# Patient Record
Sex: FEMALE | Race: WHITE | NOT HISPANIC OR LATINO | Employment: OTHER | URBAN - METROPOLITAN AREA
[De-identification: names, ages, dates, MRNs, and addresses within clinical notes are randomized per-mention and may not be internally consistent; named-entity substitution may affect disease eponyms.]

---

## 2022-06-13 LAB — HCV AB SER-ACNC: <0.1

## 2023-01-12 ENCOUNTER — OFFICE VISIT (OUTPATIENT)
Dept: OBGYN CLINIC | Facility: CLINIC | Age: 33
End: 2023-01-12
Payer: COMMERCIAL

## 2023-01-12 VITALS
BODY MASS INDEX: 33.86 KG/M2 | SYSTOLIC BLOOD PRESSURE: 108 MMHG | DIASTOLIC BLOOD PRESSURE: 72 MMHG | HEIGHT: 62 IN | WEIGHT: 184 LBS

## 2023-01-12 DIAGNOSIS — B00.9 HERPES SIMPLEX VIRUS (HSV) INFECTION: ICD-10-CM

## 2023-01-12 DIAGNOSIS — E10.40 TYPE 1 DIABETES MELLITUS WITH DIABETIC NEUROPATHY (HCC): Primary | ICD-10-CM

## 2023-01-12 DIAGNOSIS — Z3A.08 8 WEEKS GESTATION OF PREGNANCY: ICD-10-CM

## 2023-01-12 DIAGNOSIS — E03.9 ACQUIRED HYPOTHYROIDISM: ICD-10-CM

## 2023-01-12 NOTE — PROGRESS NOTES
"Assessment/Plan:    MFM referral given  RTO for PN-1  Type 1 diabetes mellitus with diabetic neuropathy [facility])  -     Ambulatory Referral to Maternal Fetal Medicine; Future    8 weeks gestation of pregnancy  -     Ambulatory Referral to Maternal Fetal Medicine; Future    Herpes simplex virus (HSV) infection    Acquired hypothyroidism        Subjective:      Patient ID: Mackenzie Rhoades is a 27 y o  female  Pat Villalba presents to establish care with [facility] for her pregnancy  She is just being released from [facility] as of 8 weeks  This is a haney IUI pregnancy, with an EDC based on LMP consistent with US per patient  Her last visit with them was yesterday  Discussed practice and delivering campus being [facility]  She would prefer to deliver here due to NICU capabilities in setting of her diabetes, even though [place] may be closer to home  All questions answered  The following portions of the patient's history were reviewed and updated as appropriate: allergies, current medications, past family history, past medical history, past social history, past surgical history and problem list     Review of Systems      Objective:      /72 (BP Location: Right arm, Patient Position: Sitting, Cuff Size: Standard)   Ht 5' 2 21\" (1 58 m)   Wt 83 5 kg (184 lb)   LMP 11/16/2022 (Approximate)   BMI 33 43 kg/m²          Physical Exam  Vitals and nursing note reviewed  Constitutional:       Appearance: Normal appearance  Neurological:      Mental Status: She is alert and oriented to person, place, and time           "

## 2023-01-12 NOTE — ASSESSMENT & PLAN NOTE
Excellent control - on GCM and pump  MFM referral placed       Lab Results   Component Value Date    HGBA1C 5 1 12/21/2022

## 2023-01-12 NOTE — ASSESSMENT & PLAN NOTE
Dose adjusted in December based on last TSH, follows with NISREEN valdez and has appointment in early February

## 2023-01-30 PROBLEM — O24.011: Status: ACTIVE | Noted: 2023-01-30

## 2023-02-02 LAB
CREAT ?TM UR-SCNC: 41.5 UMOL/L
EXT PROTEIN URINE: 4.8
HBA1C MFR BLD HPLC: 5.2 %
PROT/CREAT UR: 116 MG/G{CREAT}

## 2023-03-15 LAB — HBA1C MFR BLD HPLC: 4.8 %

## 2023-05-31 PROBLEM — Z3A.28 28 WEEKS GESTATION OF PREGNANCY: Status: ACTIVE | Noted: 2023-01-12

## 2023-05-31 PROBLEM — O99.213 MATERNAL OBESITY, ANTEPARTUM, THIRD TRIMESTER: Status: ACTIVE | Noted: 2023-04-10

## 2023-05-31 PROBLEM — E10.3493: Status: ACTIVE | Noted: 2018-04-09

## 2023-06-02 ENCOUNTER — TELEPHONE (OUTPATIENT)
Dept: OBGYN CLINIC | Facility: CLINIC | Age: 33
End: 2023-06-02

## 2023-06-02 NOTE — TELEPHONE ENCOUNTER
----- Message from Abigail Kamara sent at 6/1/2023 10:20 PM EDT -----  Regarding: ENT referral   Contact: [phone]  Joe Meade,     You let me know you could get me a referral for an ENT at our appointment yesterday  I am having terrible ear pain today so I was hoping I can contact one soon and try to figure out the cause of the pain  Let me know how to go about getting an appointment and who I should contact?     Thank you,   Abigail Kamara

## 2023-06-12 ENCOUNTER — ROUTINE PRENATAL (OUTPATIENT)
Dept: OBGYN CLINIC | Facility: CLINIC | Age: 33
End: 2023-06-12
Payer: COMMERCIAL

## 2023-06-12 VITALS
WEIGHT: 199.4 LBS | SYSTOLIC BLOOD PRESSURE: 100 MMHG | DIASTOLIC BLOOD PRESSURE: 60 MMHG | OXYGEN SATURATION: 98 % | BODY MASS INDEX: 36.47 KG/M2 | HEART RATE: 108 BPM

## 2023-06-12 DIAGNOSIS — O24.013 PRE-EXISTING TYPE 1 DIABETES MELLITUS WITH RETINOPATHY DURING PREGNANCY IN THIRD TRIMESTER (HCC): ICD-10-CM

## 2023-06-12 DIAGNOSIS — Z3A.29 29 WEEKS GESTATION OF PREGNANCY: ICD-10-CM

## 2023-06-12 DIAGNOSIS — Z34.03 ENCOUNTER FOR SUPERVISION OF NORMAL FIRST PREGNANCY IN THIRD TRIMESTER: ICD-10-CM

## 2023-06-12 DIAGNOSIS — E03.9 HYPOTHYROIDISM DURING PREGNANCY IN THIRD TRIMESTER: ICD-10-CM

## 2023-06-12 DIAGNOSIS — O99.283 HYPOTHYROIDISM DURING PREGNANCY IN THIRD TRIMESTER: ICD-10-CM

## 2023-06-12 DIAGNOSIS — E10.3493: Primary | ICD-10-CM

## 2023-06-12 DIAGNOSIS — E10.319 PRE-EXISTING TYPE 1 DIABETES MELLITUS WITH RETINOPATHY DURING PREGNANCY IN THIRD TRIMESTER (HCC): ICD-10-CM

## 2023-06-12 LAB
SL AMB  POCT GLUCOSE, UA: NORMAL
SL AMB POCT URINE PROTEIN: NORMAL

## 2023-06-12 PROCEDURE — 90715 TDAP VACCINE 7 YRS/> IM: CPT | Performed by: OBSTETRICS & GYNECOLOGY

## 2023-06-12 PROCEDURE — PNV: Performed by: OBSTETRICS & GYNECOLOGY

## 2023-06-12 PROCEDURE — 81002 URINALYSIS NONAUTO W/O SCOPE: CPT | Performed by: OBSTETRICS & GYNECOLOGY

## 2023-06-12 PROCEDURE — 90471 IMMUNIZATION ADMIN: CPT | Performed by: OBSTETRICS & GYNECOLOGY

## 2023-06-12 NOTE — ASSESSMENT & PLAN NOTE
Governor Garrick is doing well  Baby is active without concerns about fetal activity  No bleeding, LOF, contractions  Follows closely with DP and MFM  Labs up to date

## 2023-06-12 NOTE — PROGRESS NOTES
Prenatal visit  GA 29w5d  Denies any vaginal bleeding, LOF or cramping   NL FM  28 wk labs complete  Has yellow folder   Declines breastfeeding   Tdap Vaccine given today   Delivery consent signed

## 2023-06-12 NOTE — PROGRESS NOTES
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, bilateral ([facility] ) - Primary    Relevant Orders    HEMOGLOBIN A1C W/ EAG ESTIMATION    Pre-existing type 1 diabetes mellitus with retinopathy during pregnancy in third trimester ([facility] [address] )     Remains well controlled  Repeat a1c ordered as per Dr Nola Salas most recent [address]  Serial growth scans and APFS scheduled to start at 32wks  Most recent growth 53%, AC 70%  Lab Results   Component Value Date    HGBA1C 4 9 04/27/2023            Hypothyroidism during pregnancy in third trimester       Other    29 weeks gestation of pregnancy     Meron Vasquez is doing well  Baby is active without concerns about fetal activity  No bleeding, LOF, contractions  Follows closely with DP and MFM  Labs up to date           Other Visit Diagnoses     Encounter for supervision of normal first pregnancy in third trimester        Relevant Orders    POCT urine dip    Tdap Vaccine greater than or equal to 8yo

## 2023-06-12 NOTE — ASSESSMENT & PLAN NOTE
Remains well controlled  Repeat a1c ordered as per Dr Hogue Kinde most recent [address]  Serial growth scans and APFS scheduled to start at 32wks    Most recent growth 53%, AC 70%  Lab Results   Component Value Date    HGBA1C 4 9 04/27/2023

## 2023-06-13 ENCOUNTER — CLINICAL SUPPORT (OUTPATIENT)
Dept: POSTPARTUM | Facility: CLINIC | Age: 33
End: 2023-06-13

## 2023-06-13 DIAGNOSIS — Z32.2 ENCOUNTER FOR CHILDBIRTH INSTRUCTION: Primary | ICD-10-CM

## 2023-06-16 ENCOUNTER — TELEPHONE (OUTPATIENT)
Dept: OBGYN CLINIC | Facility: CLINIC | Age: 33
End: 2023-06-16

## 2023-06-16 NOTE — TELEPHONE ENCOUNTER
Patient is scheduled for a root canal on Tuesday - dentist wants to make sure it okay to proceed - she is about 31 weeks

## 2023-06-20 ENCOUNTER — TELEPHONE (OUTPATIENT)
Facility: HOSPITAL | Age: 33
End: 2023-06-20

## 2023-06-20 ENCOUNTER — CLINICAL SUPPORT (OUTPATIENT)
Dept: POSTPARTUM | Facility: CLINIC | Age: 33
End: 2023-06-20

## 2023-06-20 DIAGNOSIS — Z32.2 ENCOUNTER FOR CHILDBIRTH INSTRUCTION: Primary | ICD-10-CM

## 2023-06-20 NOTE — TELEPHONE ENCOUNTER
Left voicemail informing patient of the change for her upcoming appointment on 7/10/23 and 7/12/23  Requested she give our office a call back at [phone] with any questions or if she would need to reschedule

## 2023-06-21 ENCOUNTER — ROUTINE PRENATAL (OUTPATIENT)
Dept: OBGYN CLINIC | Facility: CLINIC | Age: 33
End: 2023-06-21
Payer: COMMERCIAL

## 2023-06-21 ENCOUNTER — TELEPHONE (OUTPATIENT)
Dept: OBGYN CLINIC | Facility: CLINIC | Age: 33
End: 2023-06-21

## 2023-06-21 VITALS — BODY MASS INDEX: 36.03 KG/M2 | WEIGHT: 197 LBS | SYSTOLIC BLOOD PRESSURE: 112 MMHG | DIASTOLIC BLOOD PRESSURE: 78 MMHG

## 2023-06-21 DIAGNOSIS — Z34.90 NORMAL INTRAUTERINE PREGNANCY, ANTEPARTUM: Primary | ICD-10-CM

## 2023-06-21 DIAGNOSIS — F41.9 ANXIETY DURING PREGNANCY IN THIRD TRIMESTER, ANTEPARTUM: ICD-10-CM

## 2023-06-21 DIAGNOSIS — O99.343 ANXIETY DURING PREGNANCY IN THIRD TRIMESTER, ANTEPARTUM: ICD-10-CM

## 2023-06-21 DIAGNOSIS — E10.319 PRE-EXISTING TYPE 1 DIABETES MELLITUS WITH RETINOPATHY DURING PREGNANCY IN THIRD TRIMESTER (HCC): ICD-10-CM

## 2023-06-21 DIAGNOSIS — O24.013 PRE-EXISTING TYPE 1 DIABETES MELLITUS WITH RETINOPATHY DURING PREGNANCY IN THIRD TRIMESTER (HCC): ICD-10-CM

## 2023-06-21 DIAGNOSIS — O16.3 ELEVATED BLOOD PRESSURE AFFECTING PREGNANCY IN THIRD TRIMESTER, ANTEPARTUM: ICD-10-CM

## 2023-06-21 DIAGNOSIS — Z3A.31 31 WEEKS GESTATION OF PREGNANCY: ICD-10-CM

## 2023-06-21 LAB
SL AMB  POCT GLUCOSE, UA: NORMAL
SL AMB POCT URINE PROTEIN: NORMAL

## 2023-06-21 PROCEDURE — PNV: Performed by: PHYSICIAN ASSISTANT

## 2023-06-21 PROCEDURE — 81002 URINALYSIS NONAUTO W/O SCOPE: CPT | Performed by: PHYSICIAN ASSISTANT

## 2023-06-21 NOTE — TELEPHONE ENCOUNTER
Patient has been dealing with multiple dental visits for root canal  Not completed yet and has considerable pain  Endodontist was not comfortable prescribing anything, deferred to OB  Patient is very anxious about having the root canal and is nervous that fetal movement has changed  Although she is meeting [facility]'s, movement is not has frequent and feels different  Requesting visit for doppler for reassurance  Will route to on call provider to advise

## 2023-06-21 NOTE — ASSESSMENT & PLAN NOTE
Referral placed to [facility]  Reviewed option of restarting a SSRI in pregnancy if anxiety worsens or disrupts QOL  Discussed importance of establishing a plan for the postpartum

## 2023-06-21 NOTE — ASSESSMENT & PLAN NOTE
130/90 upon initial presentation   Repeat at end of visit 112/78  Check outpatient baseline labs  Precautions reviewed  Advised to call with s/s; elevated readings  Follow up routine OB visit next week

## 2023-06-21 NOTE — PROGRESS NOTES
Pt here to get heart tones on baby after she had a root canal done yesterday  Feels like baby has decreased in movement

## 2023-06-21 NOTE — ASSESSMENT & PLAN NOTE
Reassurance provided to patient today  Referral to [facility] placed for counseling services  Baseline preeclampsia labs ordered given one elevated reading in office today  Repeat BP normotensive but pt with elevated risk factors for preeclampsia  She will start at home BP monitoring  Reviewed s/s of pre-eclampsia  Reviewed FKCs  Continue at home [facility]   Aware to call if not meeting threshold  Keep routine OB visit as scheduled next week  Case reviewed with Dr Dev Stein

## 2023-06-21 NOTE — TELEPHONE ENCOUNTER
Per on call provider, patient may come in to office for dopplers  Regarding pain medication, feels endodontist/dentist needs to prescribe

## 2023-06-21 NOTE — ASSESSMENT & PLAN NOTE
With some elevations since root canal   Reporting pump adjustments and readings to DP  Last A1C 4 9%  Lab Results   Component Value Date    HGBA1C 4 9 04/27/2023

## 2023-06-21 NOTE — PROGRESS NOTES
Pt presents to the office today for fetal dopplers and reassurance  She underwent a root canal this week and is having more pain  Since the root canal feels as though baby's movements have changed  She is still meeting fetal kick counts but feels it takes longer to meet these kick counts  Was offered appt today for heart tones and reassurance  Admits to worsening anxiety as pregnancy progresses  She was on Lexapro prior to pregnancy and is not interested in restarting this at this time as she feels symptoms are manageable but does wish to speak with a counselor in preparation for postpartum  Pregnancy complicated by Type 1 DM (insulin pump managed by DP), hypothyroidism, BMI 36    Denies VB, LOF, contractions  Her BP is mildly elevated today at 130/90 at the start of the visit today  She is anxious prior to [facility] and reports oral/tooth pain is significant today d/t recent root canal  Urine is neg/neg on dip  Recheck at end of visit 112/78  She reports toothache with some referred pain up jaw toward her head  Denies overt HA, blurred vision, swelling  Listened to [facility] with doppler for about 1 minute at which time 2-3 obvious fetal movement were felt by myself and patient  FHTs between 135s-145s  31 weeks gestation of pregnancy  Reassurance provided to patient today  Referral to [facility] placed for counseling services  Baseline preeclampsia labs ordered given one elevated reading in office today  Repeat BP normotensive but pt with elevated risk factors for preeclampsia  She will start at home BP monitoring  Reviewed s/s of pre-eclampsia  Reviewed FKCs  Continue at home [facility]   Aware to call if not meeting threshold  Keep routine OB visit as scheduled next week  Case reviewed with Dr Isis Peres    Pre-existing type 1 diabetes mellitus with retinopathy during pregnancy in third trimester [facility])  With some elevations since root canal   Reporting pump adjustments and readings to DP  Last A1C 4 9%  Lab Results Component Value Date    HGBA1C 4 9 04/27/2023       Elevated blood pressure affecting pregnancy in third trimester, antepartum  130/90 upon initial presentation   Repeat at end of visit 112/78  Check outpatient baseline labs  Precautions reviewed  Advised to call with s/s; elevated readings  Follow up routine OB visit next week    Anxiety during pregnancy in third trimester, antepartum  Referral placed to [facility]  Reviewed option of restarting a SSRI in pregnancy if anxiety worsens or disrupts QOL  Discussed importance of establishing a plan for the postpartum

## 2023-06-22 ENCOUNTER — TELEMEDICINE (OUTPATIENT)
Facility: HOSPITAL | Age: 33
End: 2023-06-22
Payer: COMMERCIAL

## 2023-06-22 ENCOUNTER — TELEPHONE (OUTPATIENT)
Dept: OBGYN CLINIC | Facility: CLINIC | Age: 33
End: 2023-06-22

## 2023-06-22 DIAGNOSIS — Z3A.31 31 WEEKS GESTATION OF PREGNANCY: ICD-10-CM

## 2023-06-22 DIAGNOSIS — O99.213 MATERNAL OBESITY, ANTEPARTUM, THIRD TRIMESTER: ICD-10-CM

## 2023-06-22 DIAGNOSIS — O24.013 PRE-EXISTING TYPE 1 DIABETES MELLITUS WITH RETINOPATHY DURING PREGNANCY IN THIRD TRIMESTER (HCC): Primary | ICD-10-CM

## 2023-06-22 DIAGNOSIS — E10.319 PRE-EXISTING TYPE 1 DIABETES MELLITUS WITH RETINOPATHY DURING PREGNANCY IN THIRD TRIMESTER (HCC): Primary | ICD-10-CM

## 2023-06-22 PROCEDURE — 99214 OFFICE O/P EST MOD 30 MIN: CPT | Performed by: NURSE PRACTITIONER

## 2023-06-22 NOTE — TELEPHONE ENCOUNTER
Patient had a root canal done - she is 28 wks gest - she is asking if there are any pain meds she can take - dentist needs the approval to prescribe - she stated she is in pain and cannot sleep

## 2023-06-22 NOTE — ASSESSMENT & PLAN NOTE
-Pre-pregnancy weight 184 lbs  -First visit weight 177 lbs  -Current weight 197 lbs  -TWG 13 lbs  -Recommended weight gain 11 to 20 lbs

## 2023-06-22 NOTE — ASSESSMENT & PLAN NOTE
-Last A1c 4 9% at goal   -CMP and UPCR within normal    -Repeat CMP and UPCR  -Humalog via T slim insulin pump     -Due to current dental pain; use decreased temp basal by 10 to 15% until normal eating pattern    -If pain medications are prescribed; use sleep mode or CIQ for safety reasons    -Refer to attached file for current insulin settings    -Dexcom G6 CGM in place    -Try in take combination of carbohydrates and protein via drink or soft foods until dental pain subsides  -Try to eat every 2 to 3 5 hours during the day  Minimum total daily carbohydrates in second/third trimester 175 grams paired with half in protein  -SMBG via finger stick for fasting glucose readings, 2 hours post start of each meal and with hypoglycemia   -Insulin pump review every Monday   -Always have glucose available for hypoglycemia, use 15 by 15 rule  -Follow up with OB and MFM as recommended   -Fetal growth ultrasounds every 4 to 6 weeks as recommended   -Fetal echo completed    -At 32 weeks gestation, NST twice a week and BART weekly  -Stay in close contact with diabetes team    -At 36 weeks gestation, stop baby aspirin    -Follow up with endocrinology postpartum diabetes management     Lab Results   Component Value Date    HGBA1C 4 9 04/27/2023     Lab Results   Component Value Date    HGBA1C 4 9 04/27/2023

## 2023-06-22 NOTE — TELEPHONE ENCOUNTER
Pt had root canal done on Tues and finished this morning. Pt in a lot of pain. Tylenol no help. Is there anything else she can take for pain?  Thank you

## 2023-06-22 NOTE — PROGRESS NOTES
Virtual Regular Visit    Verification of patient location:[place]    Patient is located at Home in the following state in which I hold an active license [place]      Assessment/Plan:    Problem List Items Addressed This Visit        Endocrine    Pre-existing type 1 diabetes mellitus with retinopathy during pregnancy in third trimester ([facility] [address] ) - Primary     -Last A1c 4 9% at goal   -CMP and UPCR within normal    -Repeat CMP and UPCR  -Humalog via T slim insulin pump     -Due to current dental pain; use decreased temp basal by 10 to 15% until normal eating pattern    -If pain medications are prescribed; use sleep mode or CIQ for safety reasons    -Refer to attached file for current insulin settings    -Dexcom G6 CGM in place    -Try in take combination of carbohydrates and protein via drink or soft foods until dental pain subsides  -Try to eat every 2 to 3 5 hours during the day  Minimum total daily carbohydrates in second/third trimester 175 grams paired with half in protein  -SMBG via finger stick for fasting glucose readings, 2 hours post start of each meal and with hypoglycemia   -Insulin pump review every Monday   -Always have glucose available for hypoglycemia, use 15 by 15 rule  -Follow up with OB and MFM as recommended   -Fetal growth ultrasounds every 4 to 6 weeks as recommended   -Fetal echo completed    -At 32 weeks gestation, NST twice a week and BART weekly  -Stay in close contact with diabetes team    -At 36 weeks gestation, stop baby aspirin    -Follow up with endocrinology postpartum diabetes management  Lab Results   Component Value Date    HGBA1C 4 9 04/27/2023     Lab Results   Component Value Date    HGBA1C 4 9 04/27/2023               Other    31 weeks gestation of pregnancy    BMI 36 0-36 9,adult    Maternal obesity, antepartum, third trimester     -Pre-pregnancy weight 184 lbs  -First visit weight 177 lbs  -Current weight 197 lbs  -TWG 13 lbs  -Recommended weight gain 11 to 20 lbs  Reason for visit is   Chief Complaint   Patient presents with   • Virtual Regular Visit   • Diabetes Type 1        Encounter provider Cherelle Christiansen    Provider located at [facility] [address]      Recent Visits  Date Type Provider Dept   23 Telephone Gopi Prado An    Showing recent visits within past 7 days and meeting all other requirements  Today's Visits  Date Type Provider Dept   23 [id number] [facility] LAKESHA Cook An    Showing today's visits and meeting all other requirements  Future Appointments  No visits were found meeting these conditions  Showing future appointments within next 150 days and meeting all other requirements       The patient was identified by name and date of birth  Margret Thacker was informed that this is a telemedicine visit and that the visit is being conducted through the [address] [employer] platform  She agrees to proceed     My office door was closed  No one else was in the room  She acknowledged consent and understanding of privacy and security of the video platform  The patient has agreed to participate and understands they can discontinue the visit at any time  Patient is aware this is a billable service  Subjective  Margret hTacker is a 27 y o  female G1P) 31 1/7 weeks gestation T1DM on humalog via T slim insulin pump and Dexcom G6 CGM; does self adjust insulin pump to stay within pregnancy goals and will send messages with adjustments made  Did use CIQ during root canal in order to stay safe during procedure  For readings less than 60; will use 9 or 18 grams of juice for treatment  Overall keeps very tight control of glucose readings   Downloads insulin pump every week from home and maintains contact with diabetes team    Had root canal completed; has been dealing with pain since Tuesday and unable to eat; drinking milk; having mash potatoes and pudding  Encouraged to decrease basal rates by 10 to 15% for now  Request pain medication and to use insulin pump sleep mode for safety reasons  Past Medical History:   Diagnosis Date   • Anxiety    • Diabetes mellitus ([address] )     type 1; since age 1; on dexcom (since summer 2022) and Tandem T-slim (<2mos) with excellent trend down in A1 (5 5!); had rough go in college years (did not take care of her diabetes); follows with LAKESHA Dhaliwal  • Elective procedure for unacceptable cosmetic appearance 2012    smart lipo   • Hypothyroidism 2012       Past Surgical History:   Procedure Laterality Date   • HAND SURGERY  2022       Current Outpatient Medications   Medication Sig Dispense Refill   • aspirin 81 mg chewable tablet 162 mg     • Continuous Blood Gluc  (Dexcom G6 ) TONA Use to monitor blood sugar 1 each 0   • Continuous Blood Gluc Sensor (Dexcom G6 Sensor) MISC Change every 10 days 3 each 12   • Continuous Blood Gluc Transmit (Dexcom G6 Transmitter) MISC Change every 90 days 1 each 3   • docusate sodium (COLACE) 100 mg capsule Take 100 mg by mouth 2 (two) times a day     • Doxylamine Succinate, Sleep, (UNISOM PO) Take by mouth     • folic acid (FOLVITE) 1 mg tablet Take 1 tablet (1,000 mcg total) by mouth daily 180 tablet 3   • Glucagon, rDNA, (Glucagon Emergency) 1 MG KIT Inject in the event of unconscious hypoglycemia  1 kit 0   • HumaLOG 100 UNIT/ML injection USE VIA INSULIN PUMP UP  UNITS DAILY 30 mL 6   • levothyroxine 150 mcg tablet TAKE ONE TABLET BY MOUTH EVERY DAY 30 tablet 11   • Nitrofurantoin Monohyd Macro (MACROBID PO) Take by mouth Prn with intercourse as needed (Patient not taking: Reported on 6/12/2023)     • Prenatal MV-Min-Fe Fum-FA-DHA (PRENATAL 1 PO) Take 1 tablet by mouth in the morning       No current facility-administered medications for this visit          Allergies   Allergen Reactions   • Sulfamethoxazole      Other reaction(s): severe nausea/vomiting (03/13/2017)   • Trimethoprim      Other reaction(s): severe nausea/vomiting (03/13/2017)       Review of Systems   HENT:        Dental pain   Neurological: Positive for headaches  Video Exam  Refer to attached files for insulin pump download and Dexcom report  There were no vitals filed for this visit  Physical Exam  Constitutional:       Appearance: She is ill-appearing (looks uncomfortable due to dental pain)  HENT:      Head: Normocephalic  Nose: Nose normal    Eyes:      Conjunctiva/sclera: Conjunctivae normal    Pulmonary:      Effort: Pulmonary effort is normal    Neurological:      Mental Status: She is oriented to person, place, and time  Psychiatric:         Behavior: Behavior normal          Thought Content: Thought content normal          Judgment: Judgment normal           Visit Time  Total Visit Duration: 15 minutes

## 2023-06-22 NOTE — TELEPHONE ENCOUNTER
Pt will use an ice bag and take tylenol. Trying unisom for help with sleeping. Call tomorrow if still in pain.

## 2023-06-22 NOTE — PATIENT INSTRUCTIONS
-Last A1c 4 9% at goal   -CMP and UPCR within normal    -Repeat CMP and UPCR  -Humalog via T slim insulin pump     -Due to current dental pain; use decreased temp basal by 10 to 15% until normal eating pattern    -If pain medications are prescribed; use sleep mode or CIQ for safety reasons    -Refer to attached file for current insulin settings    -Dexcom G6 CGM in place    -Try in take combination of carbohydrates and protein via drink or soft foods until dental pain subsides  -Try to eat every 2 to 3 5 hours during the day  Minimum total daily carbohydrates in second/third trimester 175 grams paired with half in protein  -SMBG via finger stick for fasting glucose readings, 2 hours post start of each meal and with hypoglycemia   -Insulin pump review every Monday   -Always have glucose available for hypoglycemia, use 15 by 15 rule  -Follow up with OB and MFM as recommended   -Fetal growth ultrasounds every 4 to 6 weeks as recommended   -Fetal echo completed    -At 32 weeks gestation, NST twice a week and BART weekly  -Stay in close contact with diabetes team    -At 36 weeks gestation, stop baby aspirin    -Follow up with endocrinology postpartum diabetes management     Lab Results   Component Value Date    HGBA1C 4 9 04/27/2023

## 2023-06-23 ENCOUNTER — TELEPHONE (OUTPATIENT)
Dept: OBGYN CLINIC | Facility: CLINIC | Age: 33
End: 2023-06-23

## 2023-06-23 DIAGNOSIS — D72.829 LEUKOCYTOSIS, UNSPECIFIED TYPE: Primary | ICD-10-CM

## 2023-06-23 LAB
ALBUMIN SERPL-MCNC: 3.3 G/DL (ref 3.8–4.8)
ALBUMIN/GLOB SERPL: 1 {RATIO} (ref 1.2–2.2)
ALP SERPL-CCNC: 91 IU/L (ref 44–121)
ALT SERPL-CCNC: 8 IU/L (ref 0–32)
AST SERPL-CCNC: 17 IU/L (ref 0–40)
BASOPHILS # BLD AUTO: 0.1 X10E3/UL (ref 0–0.2)
BASOPHILS NFR BLD AUTO: 0 %
BILIRUB SERPL-MCNC: 0.4 MG/DL (ref 0–1.2)
BUN SERPL-MCNC: 10 MG/DL (ref 6–20)
BUN/CREAT SERPL: 14 (ref 9–23)
CALCIUM SERPL-MCNC: 8.7 MG/DL (ref 8.7–10.2)
CHLORIDE SERPL-SCNC: 103 MMOL/L (ref 96–106)
CO2 SERPL-SCNC: 23 MMOL/L (ref 20–29)
CREAT SERPL-MCNC: 0.69 MG/DL (ref 0.57–1)
CREAT UR-MCNC: 64.5 MG/DL
EGFR: 117 ML/MIN/1.73
EOSINOPHIL # BLD AUTO: 0.5 X10E3/UL (ref 0–0.4)
EOSINOPHIL NFR BLD AUTO: 3 %
ERYTHROCYTE [DISTWIDTH] IN BLOOD BY AUTOMATED COUNT: 12.2 % (ref 11.7–15.4)
EST. AVERAGE GLUCOSE BLD GHB EST-MCNC: 97 MG/DL
GLOBULIN SER-MCNC: 3.4 G/DL (ref 1.5–4.5)
GLUCOSE SERPL-MCNC: 73 MG/DL (ref 70–99)
HBA1C MFR BLD: 5 % (ref 4.8–5.6)
HCT VFR BLD AUTO: 35.8 % (ref 34–46.6)
HGB BLD-MCNC: 12.3 G/DL (ref 11.1–15.9)
IMM GRANULOCYTES # BLD: 0.1 X10E3/UL (ref 0–0.1)
IMM GRANULOCYTES NFR BLD: 1 %
LYMPHOCYTES # BLD AUTO: 2.7 X10E3/UL (ref 0.7–3.1)
LYMPHOCYTES NFR BLD AUTO: 17 %
MCH RBC QN AUTO: 31.5 PG (ref 26.6–33)
MCHC RBC AUTO-ENTMCNC: 34.4 G/DL (ref 31.5–35.7)
MCV RBC AUTO: 92 FL (ref 79–97)
MONOCYTES # BLD AUTO: 1.2 X10E3/UL (ref 0.1–0.9)
MONOCYTES NFR BLD AUTO: 8 %
NEUTROPHILS # BLD AUTO: 11.4 X10E3/UL (ref 1.4–7)
NEUTROPHILS NFR BLD AUTO: 71 %
PLATELET # BLD AUTO: 230 X10E3/UL (ref 150–450)
POTASSIUM SERPL-SCNC: 4.1 MMOL/L (ref 3.5–5.2)
PROT SERPL-MCNC: 6.7 G/DL (ref 6–8.5)
PROT UR-MCNC: 8.5 MG/DL
PROT/CREAT UR: 132 MG/G CREAT (ref 0–200)
RBC # BLD AUTO: 3.91 X10E6/UL (ref 3.77–5.28)
SODIUM SERPL-SCNC: 138 MMOL/L (ref 134–144)
WBC # BLD AUTO: 15.9 X10E3/UL (ref 3.4–10.8)

## 2023-06-28 ENCOUNTER — ROUTINE PRENATAL (OUTPATIENT)
Dept: OBGYN CLINIC | Facility: CLINIC | Age: 33
End: 2023-06-28
Payer: COMMERCIAL

## 2023-06-28 VITALS — SYSTOLIC BLOOD PRESSURE: 108 MMHG | WEIGHT: 199 LBS | BODY MASS INDEX: 36.4 KG/M2 | DIASTOLIC BLOOD PRESSURE: 62 MMHG

## 2023-06-28 DIAGNOSIS — Z34.03 ENCOUNTER FOR SUPERVISION OF NORMAL FIRST PREGNANCY IN THIRD TRIMESTER: Primary | ICD-10-CM

## 2023-06-28 LAB
SL AMB  POCT GLUCOSE, UA: ABNORMAL
SL AMB POCT URINE PROTEIN: ABNORMAL

## 2023-06-28 PROCEDURE — 81002 URINALYSIS NONAUTO W/O SCOPE: CPT | Performed by: OBSTETRICS & GYNECOLOGY

## 2023-06-28 PROCEDURE — PNV: Performed by: OBSTETRICS & GYNECOLOGY

## 2023-06-28 NOTE — PROGRESS NOTES
Prenatal 32w0d  Blood type A Positive  Has MFM appointments scheduled thru Aug   Delivery consent previously signed  Declines breast pump  Denies Leaking of Fluid, vaginal bleeding, contractions  +Fetal Movement

## 2023-07-03 ENCOUNTER — ULTRASOUND (OUTPATIENT)
Dept: PERINATAL CARE | Facility: OTHER | Age: 33
End: 2023-07-03
Payer: COMMERCIAL

## 2023-07-03 VITALS
BODY MASS INDEX: 36.4 KG/M2 | HEART RATE: 104 BPM | RESPIRATION RATE: 18 BRPM | WEIGHT: 199 LBS | DIASTOLIC BLOOD PRESSURE: 80 MMHG | OXYGEN SATURATION: 99 % | SYSTOLIC BLOOD PRESSURE: 116 MMHG

## 2023-07-03 DIAGNOSIS — O99.213 MATERNAL OBESITY, ANTEPARTUM, THIRD TRIMESTER: ICD-10-CM

## 2023-07-03 DIAGNOSIS — Z3A.32 32 WEEKS GESTATION OF PREGNANCY: Primary | ICD-10-CM

## 2023-07-03 DIAGNOSIS — E10.3493: ICD-10-CM

## 2023-07-03 PROCEDURE — 76816 OB US FOLLOW-UP PER FETUS: CPT | Performed by: OBSTETRICS & GYNECOLOGY

## 2023-07-03 PROCEDURE — 59025 FETAL NON-STRESS TEST: CPT | Performed by: OBSTETRICS & GYNECOLOGY

## 2023-07-03 PROCEDURE — 99214 OFFICE O/P EST MOD 30 MIN: CPT | Performed by: OBSTETRICS & GYNECOLOGY

## 2023-07-03 NOTE — PROGRESS NOTES
Non-Stress Testing:    Non-Stress test, equipment, procedure, and expected outcomes reviewed. Reviewed fetal kick counts and when to call OB. Shannan Conrad Verified patient understanding of fetal kick counts with teach back method. Patient reports feeling daily fetal movements. Patient has no questions or concerns.

## 2023-07-03 NOTE — LETTER
July 3, 2023     Esteban Dockery, [address] [address]    Patient: Halina Colon   YOB: 1990   Date of Visit: 7/3/2023       Dear Dr. Jorge L Diehl: Thank you for referring Halina Colon to me for evaluation. Below are my notes for this consultation. If you have questions, please do not hesitate to call me. I look forward to following your patient along with you. Sincerely,        Reji Ravi MD        CC: No Recipients    Reji Ravi MD  7/3/2023  2:51 PM  Sign when Signing Visit  A fetal ultrasound and NST were completed. See Ob procedures in Epic for an interpretation and recommendations. Do not hesitate to contact us in [facility] if you have questions. Harjinder Crockett MD, [address] [facility]  Maternal Fetal Medicine

## 2023-07-03 NOTE — PROGRESS NOTES
A fetal ultrasound and NST were completed. See Ob procedures in Epic for an interpretation and recommendations. Do not hesitate to contact us in [facility] if you have questions. Chente Palomo MD, [address] [facility]  Maternal Fetal Medicine

## 2023-07-03 NOTE — LETTER
NST sleeve cover sheet    Patient name: Philip Damon  : 1990  MRN: [id number]    SHOAIB: Estimated Date of Delivery: 23    Obstetrician:  Claudio Christensen    Reason(s) for testing:  A2GDM      Testing frequency:    _x_ 2x/wk  ___ 1x/wk  ___ Dopplers  ___ BPP?       Last growth scan: __________________________________________

## 2023-07-07 ENCOUNTER — ROUTINE PRENATAL (OUTPATIENT)
Dept: PERINATAL CARE | Facility: OTHER | Age: 33
End: 2023-07-07
Payer: COMMERCIAL

## 2023-07-07 VITALS
DIASTOLIC BLOOD PRESSURE: 68 MMHG | WEIGHT: 199.4 LBS | HEART RATE: 104 BPM | SYSTOLIC BLOOD PRESSURE: 122 MMHG | BODY MASS INDEX: 36.7 KG/M2 | HEIGHT: 62 IN

## 2023-07-07 DIAGNOSIS — O24.013 PRE-EXISTING TYPE 1 DIABETES MELLITUS WITH RETINOPATHY DURING PREGNANCY IN THIRD TRIMESTER (HCC): Primary | ICD-10-CM

## 2023-07-07 DIAGNOSIS — Z3A.33 33 WEEKS GESTATION OF PREGNANCY: ICD-10-CM

## 2023-07-07 DIAGNOSIS — E10.319 PRE-EXISTING TYPE 1 DIABETES MELLITUS WITH RETINOPATHY DURING PREGNANCY IN THIRD TRIMESTER (HCC): Primary | ICD-10-CM

## 2023-07-07 PROCEDURE — 59025 FETAL NON-STRESS TEST: CPT | Performed by: OBSTETRICS & GYNECOLOGY

## 2023-07-07 NOTE — PROGRESS NOTES
[address] [facility]: Ms. Luz Elena Wolfe was seen today for NST (found under the pregnancy episode) which I reviewed the RN assessment and agree. Please don't hesitate to contact our office with any concerns or questions.   Ashly Hilton MD

## 2023-07-07 NOTE — PATIENT INSTRUCTIONS
Thank you for choosing us for your  care today. If you have any questions about your ultrasound or care, please do not hesitate to contact us or your primary obstetrician. Some general instructions for your pregnancy are:    Exercise: Aim for 22 minutes per day (150 minutes per week) of regular exercise. Walking is great! Nutrition: Choose healthy sources of calcium, iron, and protein. Learn about Preeclampsia: preeclampsia is a common, serious high blood pressure complication in pregnancy. A blood pressure of 079ZOWW (systolic or top number) or 96IESR (diastolic or bottom number) is not normal and needs evaluation by your doctor. Aspirin is sometimes prescribed in early pregnancy to prevent preeclampsia in women with risk factors - ask your obstetrician if you should be on this medication. For more resources, visit:  MapCoverage.fi  If you smoke, try to reduce how many cigarettes you smoke or try to quit completely. Do not vape. Other warning signs to watch out for in pregnancy or postpartum: chest pain, obstructed breathing or shortness of breath, seizures, thoughts of hurting yourself or your baby, bleeding, a painful or swollen leg, fever, or headache (see [employer] POST-BIRTH Warning Signs campaign). If these happen call 911. Itching is also not normal in pregnancy and if you experience this, especially over your hands and feet, potentially worse at night, notify your doctors.

## 2023-07-07 NOTE — PROGRESS NOTES
Repeat Non-Stress Testing:    Patient verbalizes +FM. Pt denies ALL:               Leaking of fluid   Contractions   Vaginal bleeding   Decreased fetal movement    Patient is performing daily kick counts. Patient has no questions or concerns.

## 2023-07-10 ENCOUNTER — ROUTINE PRENATAL (OUTPATIENT)
Dept: PERINATAL CARE | Facility: OTHER | Age: 33
End: 2023-07-10
Payer: COMMERCIAL

## 2023-07-10 VITALS
WEIGHT: 200.2 LBS | HEIGHT: 62 IN | SYSTOLIC BLOOD PRESSURE: 124 MMHG | BODY MASS INDEX: 36.84 KG/M2 | HEART RATE: 99 BPM | DIASTOLIC BLOOD PRESSURE: 66 MMHG

## 2023-07-10 DIAGNOSIS — Z3A.33 33 WEEKS GESTATION OF PREGNANCY: ICD-10-CM

## 2023-07-10 DIAGNOSIS — E10.319 PRE-EXISTING TYPE 1 DIABETES MELLITUS WITH RETINOPATHY DURING PREGNANCY IN THIRD TRIMESTER (HCC): Primary | ICD-10-CM

## 2023-07-10 DIAGNOSIS — O24.013 PRE-EXISTING TYPE 1 DIABETES MELLITUS WITH RETINOPATHY DURING PREGNANCY IN THIRD TRIMESTER (HCC): Primary | ICD-10-CM

## 2023-07-10 PROCEDURE — 59025 FETAL NON-STRESS TEST: CPT | Performed by: OBSTETRICS & GYNECOLOGY

## 2023-07-11 ENCOUNTER — ROUTINE PRENATAL (OUTPATIENT)
Dept: OBGYN CLINIC | Facility: CLINIC | Age: 33
End: 2023-07-11
Payer: COMMERCIAL

## 2023-07-11 VITALS
BODY MASS INDEX: 36.54 KG/M2 | HEART RATE: 103 BPM | WEIGHT: 199.8 LBS | SYSTOLIC BLOOD PRESSURE: 106 MMHG | DIASTOLIC BLOOD PRESSURE: 76 MMHG

## 2023-07-11 DIAGNOSIS — Z34.03 ENCOUNTER FOR SUPERVISION OF NORMAL FIRST PREGNANCY IN THIRD TRIMESTER: Primary | ICD-10-CM

## 2023-07-11 DIAGNOSIS — E03.9 HYPOTHYROIDISM DURING PREGNANCY IN THIRD TRIMESTER: ICD-10-CM

## 2023-07-11 DIAGNOSIS — O16.3 ELEVATED BLOOD PRESSURE AFFECTING PREGNANCY IN THIRD TRIMESTER, ANTEPARTUM: ICD-10-CM

## 2023-07-11 DIAGNOSIS — E10.319 PRE-EXISTING TYPE 1 DIABETES MELLITUS WITH RETINOPATHY DURING PREGNANCY IN THIRD TRIMESTER (HCC): ICD-10-CM

## 2023-07-11 DIAGNOSIS — B00.9 HERPES SIMPLEX VIRUS (HSV) INFECTION: ICD-10-CM

## 2023-07-11 DIAGNOSIS — O99.283 HYPOTHYROIDISM DURING PREGNANCY IN THIRD TRIMESTER: ICD-10-CM

## 2023-07-11 DIAGNOSIS — O24.013 PRE-EXISTING TYPE 1 DIABETES MELLITUS WITH RETINOPATHY DURING PREGNANCY IN THIRD TRIMESTER (HCC): ICD-10-CM

## 2023-07-11 DIAGNOSIS — O43.193 MARGINAL INSERTION OF UMBILICAL CORD AFFECTING MANAGEMENT OF MOTHER IN THIRD TRIMESTER: ICD-10-CM

## 2023-07-11 DIAGNOSIS — Z3A.33 33 WEEKS GESTATION OF PREGNANCY: ICD-10-CM

## 2023-07-11 DIAGNOSIS — O99.213 MATERNAL OBESITY, ANTEPARTUM, THIRD TRIMESTER: ICD-10-CM

## 2023-07-11 LAB
SL AMB  POCT GLUCOSE, UA: ABNORMAL
SL AMB POCT URINE PROTEIN: ABNORMAL

## 2023-07-11 PROCEDURE — 81002 URINALYSIS NONAUTO W/O SCOPE: CPT | Performed by: PHYSICIAN ASSISTANT

## 2023-07-11 PROCEDURE — PNV: Performed by: PHYSICIAN ASSISTANT

## 2023-07-11 RX ORDER — VALACYCLOVIR HYDROCHLORIDE 500 MG/1
500 TABLET, FILM COATED ORAL 2 TIMES DAILY
Qty: 60 TABLET | Refills: 1 | Status: SHIPPED | OUTPATIENT
Start: 2023-07-11 | End: 2023-09-09

## 2023-07-11 NOTE — ASSESSMENT & PLAN NOTE
Do Velasco  is a 27 y.o.  @33w6d who presents for routine prenatal visit. Growth scan- EFW71% at 32 weeks  TDAP up to date  GBS due next visit  Plans to breastfeed. Pump -   Perineal massage -   PP Contraception -   Has yellow packet. Reports good fetal movement. Denies LOF, vaginal bleeding, regular uterine contractions, cramping, headaches or visual changes. Reviewed PTL/Labor precautions and FKC.

## 2023-07-11 NOTE — ASSESSMENT & PLAN NOTE
One genital outbreak 14 years ago  Rx sent for Valtrex prophylaxis   Advised to start between 35-36 wks

## 2023-07-11 NOTE — PROGRESS NOTES
Please refer to the [facility] ultrasound report in Ob Procedures for additional information regarding today's visit

## 2023-07-11 NOTE — ASSESSMENT & PLAN NOTE
T1DM followed closely by DP and compliant with care  Adequate control as last A1C 5.0%  We discussed recommendation of 39 0/7- 39 6/7 IOL for preexisting adequately controlled DM. She is agreeable to this plan  Will plan to send 39 wk IOL request today  Will have pt continue to follow with MFM for twice weekly NST, weekly BART and serial growth scans. Reviewed if MFM changes delivery recommendations then can readjust induction scheduled  IOL consents were not signed today.  Can sign at future appointments  Lab Results   Component Value Date    HGBA1C 5.0 06/22/2023

## 2023-07-11 NOTE — PROGRESS NOTES
33 weeks gestation of pregnancy  Carlos Alberto Tilley  is a 27 y.o.  @33w6d who presents for routine prenatal visit. Growth scan- EFW71% at 32 weeks  TDAP up to date  GBS due next visit  Plans to breastfeed. Pump -   Perineal massage -   PP Contraception -   Has yellow packet. Reports good fetal movement. Denies LOF, vaginal bleeding, regular uterine contractions, cramping, headaches or visual changes. Reviewed PTL/Labor precautions and FKC. 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 She is agreeable to this plan  Will plan to send 39 wk IOL request today  Will have pt continue to follow with MFM for twice weekly NST, weekly BART and serial growth scans. Reviewed if MFM changes delivery recommendations then can readjust induction scheduled  IOL consents were not signed today.  Can sign at future appointments  Lab Results   Component Value Date    HGBA1C 5.0 2023       Hypothyroidism during pregnancy in third trimester  Last TSH 2.060  Due for repeat studies  Reminded to complete    Maternal obesity, antepartum, third trimester  TWG 15#  Taking ASA  Advised to d/c ASA at 36 wks    Marginal insertion of umbilical cord affecting management of mother in third trimester  Serial growth US scheduled    Herpes simplex virus (HSV) infection  One genital outbreak 14 years ago  Rx sent for Valtrex prophylaxis   Advised to start between 35-36 wks    Elevated blood pressure affecting pregnancy in third trimester, antepartum  Normotensive today

## 2023-07-11 NOTE — PROGRESS NOTES
Patient here for prenatal visit. She denies complaints. Good fetal movement. She does not plan on breastfeeding. Pediatric referral placed. NST & US for fetal growth 7/10/23. NST's scheduled twice weekly. Urine neg for glucose; trace of protein. Birth plan handed in today.

## 2023-07-12 ENCOUNTER — PATIENT MESSAGE (OUTPATIENT)
Dept: OBGYN CLINIC | Facility: CLINIC | Age: 33
End: 2023-07-12

## 2023-07-12 ENCOUNTER — ULTRASOUND (OUTPATIENT)
Dept: PERINATAL CARE | Facility: OTHER | Age: 33
End: 2023-07-12
Payer: COMMERCIAL

## 2023-07-12 ENCOUNTER — TELEPHONE (OUTPATIENT)
Dept: OBGYN CLINIC | Facility: CLINIC | Age: 33
End: 2023-07-12

## 2023-07-12 VITALS
WEIGHT: 199.4 LBS | BODY MASS INDEX: 36.7 KG/M2 | HEART RATE: 101 BPM | HEIGHT: 62 IN | SYSTOLIC BLOOD PRESSURE: 114 MMHG | DIASTOLIC BLOOD PRESSURE: 62 MMHG

## 2023-07-12 DIAGNOSIS — E10.319 PRE-EXISTING TYPE 1 DIABETES MELLITUS WITH RETINOPATHY DURING PREGNANCY IN THIRD TRIMESTER (HCC): Primary | ICD-10-CM

## 2023-07-12 DIAGNOSIS — Z3A.34 34 WEEKS GESTATION OF PREGNANCY: ICD-10-CM

## 2023-07-12 DIAGNOSIS — O24.013 PRE-EXISTING TYPE 1 DIABETES MELLITUS WITH RETINOPATHY DURING PREGNANCY IN THIRD TRIMESTER (HCC): Primary | ICD-10-CM

## 2023-07-12 PROCEDURE — 59025 FETAL NON-STRESS TEST: CPT | Performed by: OBSTETRICS & GYNECOLOGY

## 2023-07-12 PROCEDURE — 76815 OB US LIMITED FETUS(S): CPT | Performed by: OBSTETRICS & GYNECOLOGY

## 2023-07-12 NOTE — LETTER
July 12, 2023     Zelda Flanagan, [address]    Patient: Adrian Maza   YOB: 1990   Date of Visit: 7/12/2023       Dear Dr. Anahi Leigh: Thank you for referring Adrian Maza to me for evaluation. Below are my notes for this consultation. If you have questions, please do not hesitate to call me. I look forward to following your patient along with you.          Sincerely,        Efrain Meza MD        CC: No Recipients    Efrain Meza MD  7/11/2023  5:17 PM  Sign when Signing Visit  Please refer to the [facility] ultrasound report in Ob Procedures for additional information regarding today's visit

## 2023-07-12 NOTE — TELEPHONE ENCOUNTER
----- Message from Clarence Elias PA-C sent at 7/11/2023  5:04 PM EDT -----  Regarding: IOL scheduling  Procedure to be scheduled (IOL or CS): medical IOL  SHOAIB: Estimated Date of Delivery: 8/23/23  Indication for delivery: pre-existing type 1 DM  Requested date (s) of delivery: 39 0/7- 39 6/7    If requested date is unavailable, is there a date by which the pt must be delivered? 44 6/7  Physician preference: prefers a date when Zain or Akanksha are on [facility] call    If IOL, anticipated method: with ripening  If CS, with or without tubal: n/a

## 2023-07-12 NOTE — TELEPHONE ENCOUNTER
Patient notified of IOL date,time,and location. Advised patient she may eat a light breakfast/dinner prior to going to L&D. In the interim please report any vaginal bleeding,leakage of fluid,decreased fetal movement or contractions. Reviewed fetal kick counts. Advised to keep all upcoming prenatal visits. Patient verbalizes understanding. Routed to on call providers as FYI. 8/17 at 8pm - left message for patient.

## 2023-07-16 NOTE — PROGRESS NOTES
Please refer to the [facility] ultrasound report in Ob Procedures for additional information regarding today's visit

## 2023-07-19 ENCOUNTER — ULTRASOUND (OUTPATIENT)
Dept: PERINATAL CARE | Facility: OTHER | Age: 33
End: 2023-07-19
Payer: COMMERCIAL

## 2023-07-19 VITALS
HEART RATE: 96 BPM | SYSTOLIC BLOOD PRESSURE: 134 MMHG | DIASTOLIC BLOOD PRESSURE: 80 MMHG | HEIGHT: 62 IN | BODY MASS INDEX: 37.13 KG/M2 | WEIGHT: 201.8 LBS

## 2023-07-19 DIAGNOSIS — E10.319 PRE-EXISTING TYPE 1 DIABETES MELLITUS WITH RETINOPATHY DURING PREGNANCY IN THIRD TRIMESTER (HCC): Primary | ICD-10-CM

## 2023-07-19 DIAGNOSIS — Z3A.35 35 WEEKS GESTATION OF PREGNANCY: ICD-10-CM

## 2023-07-19 DIAGNOSIS — O24.013 PRE-EXISTING TYPE 1 DIABETES MELLITUS WITH RETINOPATHY DURING PREGNANCY IN THIRD TRIMESTER (HCC): Primary | ICD-10-CM

## 2023-07-19 PROCEDURE — 59025 FETAL NON-STRESS TEST: CPT | Performed by: OBSTETRICS & GYNECOLOGY

## 2023-07-19 PROCEDURE — 76815 OB US LIMITED FETUS(S): CPT | Performed by: OBSTETRICS & GYNECOLOGY

## 2023-07-19 NOTE — LETTER
July 19, 2023     [facility], [address]    Patient: Yvonne Wisdom   YOB: 1990   Date of Visit: 7/19/2023       Dear Dr. Benji Bustillo: Thank you for referring Yvonne Wisdom to me for evaluation. Below are my notes for this consultation. If you have questions, please do not hesitate to call me. I look forward to following your patient along with you.          Sincerely,        Christopher Vallejo MD        CC: No Recipients    Christopher Vallejo MD  7/16/2023  1:15 PM  Sign when Signing Visit  Please refer to the [facility] ultrasound report in Ob Procedures for additional information regarding today's visit

## 2023-07-21 ENCOUNTER — ROUTINE PRENATAL (OUTPATIENT)
Dept: PERINATAL CARE | Facility: OTHER | Age: 33
End: 2023-07-21
Payer: COMMERCIAL

## 2023-07-21 VITALS
WEIGHT: 204.2 LBS | HEIGHT: 62 IN | HEART RATE: 98 BPM | BODY MASS INDEX: 37.58 KG/M2 | SYSTOLIC BLOOD PRESSURE: 114 MMHG | DIASTOLIC BLOOD PRESSURE: 70 MMHG

## 2023-07-21 DIAGNOSIS — O24.013 PRE-EXISTING TYPE 1 DIABETES MELLITUS WITH RETINOPATHY DURING PREGNANCY IN THIRD TRIMESTER (HCC): Primary | ICD-10-CM

## 2023-07-21 DIAGNOSIS — Z3A.35 35 WEEKS GESTATION OF PREGNANCY: ICD-10-CM

## 2023-07-21 DIAGNOSIS — E10.319 PRE-EXISTING TYPE 1 DIABETES MELLITUS WITH RETINOPATHY DURING PREGNANCY IN THIRD TRIMESTER (HCC): Primary | ICD-10-CM

## 2023-07-21 PROCEDURE — 59025 FETAL NON-STRESS TEST: CPT | Performed by: OBSTETRICS & GYNECOLOGY

## 2023-07-21 PROCEDURE — 76815 OB US LIMITED FETUS(S): CPT | Performed by: OBSTETRICS & GYNECOLOGY

## 2023-07-23 NOTE — PATIENT INSTRUCTIONS
Thank you for choosing us for your  care today. If you have any questions about your ultrasound or care, please do not hesitate to contact us or your primary obstetrician. Use your automated blood pressure cuff to check your blood pressure at home. Write down the top number (systolic) and bottom number (diastolic). This will help your providers follow your blood pressure and decide if any additional testing or monitoring is necessary. A top number (systolic) of at or above 075GUPE OR a bottom number (diastolic) of at or above 90 mmHg is considered elevated. Report any elevations to your doctors; if severe (top number >= 160 OR bottom number >= 100, call Labor and Delivery). Some general instructions for your pregnancy are:    Exercise: Aim for 22 minutes per day (150 minutes per week) of regular exercise. Walking is great! Nutrition: Choose healthy sources of calcium, iron, and protein. Learn about Preeclampsia: preeclampsia is a common, serious high blood pressure complication in pregnancy. A blood pressure of 362CIBA (systolic or top number) or 16FQOV (diastolic or bottom number) is not normal and needs evaluation by your doctor. Aspirin is sometimes prescribed in early pregnancy to prevent preeclampsia in women with risk factors - ask your obstetrician if you should be on this medication. For more resources, visit:  MapCoverage.fi  If you smoke, try to reduce how many cigarettes you smoke or try to quit completely. Do not vape. Other warning signs to watch out for in pregnancy or postpartum: chest pain, obstructed breathing or shortness of breath, seizures, thoughts of hurting yourself or your baby, bleeding, a painful or swollen leg, fever, or headache (see [employer] POST-BIRTH Warning Signs campaign). If these happen call 911.   Itching is also not normal in pregnancy and if you experience this, especially over your hands and feet, potentially worse at night, notify your doctors.

## 2023-07-24 ENCOUNTER — ULTRASOUND (OUTPATIENT)
Dept: PERINATAL CARE | Facility: OTHER | Age: 33
End: 2023-07-24
Payer: COMMERCIAL

## 2023-07-24 VITALS
BODY MASS INDEX: 37.58 KG/M2 | HEART RATE: 93 BPM | DIASTOLIC BLOOD PRESSURE: 78 MMHG | SYSTOLIC BLOOD PRESSURE: 124 MMHG | WEIGHT: 204.2 LBS | HEIGHT: 62 IN

## 2023-07-24 DIAGNOSIS — E10.3212 MILD NONPROLIFERATIVE DIABETIC RETINOPATHY OF LEFT EYE WITH MACULAR EDEMA ASSOCIATED WITH TYPE 1 DIABETES MELLITUS (HCC): ICD-10-CM

## 2023-07-24 DIAGNOSIS — E10.319 PRE-EXISTING TYPE 1 DIABETES MELLITUS WITH RETINOPATHY DURING PREGNANCY IN THIRD TRIMESTER (HCC): ICD-10-CM

## 2023-07-24 DIAGNOSIS — O99.213 MATERNAL OBESITY, ANTEPARTUM, THIRD TRIMESTER: ICD-10-CM

## 2023-07-24 DIAGNOSIS — O43.193 MARGINAL INSERTION OF UMBILICAL CORD AFFECTING MANAGEMENT OF MOTHER IN THIRD TRIMESTER: ICD-10-CM

## 2023-07-24 DIAGNOSIS — O24.013 PRE-EXISTING TYPE 1 DIABETES MELLITUS WITH RETINOPATHY DURING PREGNANCY IN THIRD TRIMESTER (HCC): ICD-10-CM

## 2023-07-24 DIAGNOSIS — Z3A.35 35 WEEKS GESTATION OF PREGNANCY: ICD-10-CM

## 2023-07-24 DIAGNOSIS — Z36.89 ENCOUNTER FOR ULTRASOUND TO CHECK FETAL GROWTH: Primary | ICD-10-CM

## 2023-07-24 PROCEDURE — 76816 OB US FOLLOW-UP PER FETUS: CPT | Performed by: OBSTETRICS & GYNECOLOGY

## 2023-07-24 PROCEDURE — 76818 FETAL BIOPHYS PROFILE W/NST: CPT | Performed by: OBSTETRICS & GYNECOLOGY

## 2023-07-24 PROCEDURE — 99214 OFFICE O/P EST MOD 30 MIN: CPT | Performed by: OBSTETRICS & GYNECOLOGY

## 2023-07-24 NOTE — LETTER
2023    Kim Dela Cruz MD  [address]    Patient: Mira Cabrera   YOB: 1990   Date of Visit: 2023   Gestational age 29w6d   Laura Barrett of this communication: Routine though please note delivery timing 37-38 weeks by vasculopathy   Who saw her last from your group: Kailyn Napoles   Who is seeing her next from your group: Nani       Dear Sandeep Woodard,    This patient was seen recently in our  office. The content of my evaluation today is in the ultrasound report under "OB Procedures" tab. Please don't hesitate to contact our office with any concerns or questions.      Sincerely,    Nette Prater MD  Attending Physician, [address] [place] [address]

## 2023-07-24 NOTE — PROGRESS NOTES
[facility]: Ms. Francheska Gotti was seen today for NST (found under the pregnancy episode) which I reviewed the RN assessment and agree, and fetal growth ultrasound (see ultrasound report under OB procedures tab). The time spent on this established patient on the encounter date included 5 minutes previsit service time reviewing records and precharting, 10 minutes face-to-face service time counseling regarding results and coordinating care, and  5 minutes charting, totalling 20 minutes. Please don't hesitate to contact our office with any concerns or questions.   Edgar Rios MD

## 2023-07-25 ENCOUNTER — OFFICE VISIT (OUTPATIENT)
Dept: ENDOCRINOLOGY | Facility: HOSPITAL | Age: 33
End: 2023-07-25
Payer: COMMERCIAL

## 2023-07-25 VITALS
HEIGHT: 62 IN | WEIGHT: 203.8 LBS | BODY MASS INDEX: 37.5 KG/M2 | DIASTOLIC BLOOD PRESSURE: 80 MMHG | HEART RATE: 106 BPM | SYSTOLIC BLOOD PRESSURE: 120 MMHG

## 2023-07-25 DIAGNOSIS — E03.9 HYPOTHYROIDISM AFFECTING PREGNANCY IN SECOND TRIMESTER: ICD-10-CM

## 2023-07-25 DIAGNOSIS — O99.281 THYROID DISEASE DURING PREGNANCY IN FIRST TRIMESTER: ICD-10-CM

## 2023-07-25 DIAGNOSIS — E07.9 THYROID DISEASE DURING PREGNANCY IN THIRD TRIMESTER: Primary | ICD-10-CM

## 2023-07-25 DIAGNOSIS — E07.9 THYROID DISEASE DURING PREGNANCY IN FIRST TRIMESTER: ICD-10-CM

## 2023-07-25 DIAGNOSIS — O99.283 THYROID DISEASE DURING PREGNANCY IN THIRD TRIMESTER: Primary | ICD-10-CM

## 2023-07-25 DIAGNOSIS — E10.40 TYPE 1 DIABETES MELLITUS WITH DIABETIC NEUROPATHY (HCC): ICD-10-CM

## 2023-07-25 DIAGNOSIS — E10.9 TYPE 1 DIABETES MELLITUS WITHOUT COMPLICATION (HCC): ICD-10-CM

## 2023-07-25 DIAGNOSIS — O99.282 HYPOTHYROIDISM AFFECTING PREGNANCY IN SECOND TRIMESTER: ICD-10-CM

## 2023-07-25 DIAGNOSIS — E10.3212 MILD NONPROLIFERATIVE DIABETIC RETINOPATHY OF LEFT EYE WITH MACULAR EDEMA ASSOCIATED WITH TYPE 1 DIABETES MELLITUS (HCC): ICD-10-CM

## 2023-07-25 DIAGNOSIS — E03.9 ACQUIRED HYPOTHYROIDISM: ICD-10-CM

## 2023-07-25 LAB
T4 FREE SERPL-MCNC: 1.27 NG/DL (ref 0.82–1.77)
TSH SERPL DL<=0.005 MIU/L-ACNC: 0.75 UIU/ML (ref 0.45–4.5)

## 2023-07-25 PROCEDURE — 99214 OFFICE O/P EST MOD 30 MIN: CPT | Performed by: NURSE PRACTITIONER

## 2023-07-25 NOTE — PATIENT INSTRUCTIONS
Be mindful of diet. Stay active and stay hydrated. For now, we will continue your current insulin pump settings. Continue to follow-up with maternal-fetal medicine. Please discuss recent mild low blood sugars. Continue the DEX COM. KEEP UP THE GREAT WORK !!!     Continue levothyroxine at current dose. Take your levothyroxine consistently, in the morning, on an empty stomach with only water and wait 30 min before eating and 4 hr before taking multivitamins or supplements. Recheck TSH and free T4 every 4 weeks throughout pregnancy. Obtain lab work prior to next visit.

## 2023-07-25 NOTE — PROGRESS NOTES
Mehran Prim 27 y.o. female MRN: [id number]    Encounter: [id number]      Assessment/Plan     Assessment: This is a 27y.o.-year-old female with type  1 diabetes and hypothyroidism in her third trimester. Plan:  1.  Hypothyroidism. Most recent thyroid function studies are normal with a TSH of 1.6. For now, she will continue levothyroxine 150 mcg - 1 tablet daily for 6 days of the week and half tablet on Sunday.  She will continue to get monthly blood work while pregnant.      2.  Type 1 diabetes. Ashia Underwood is utilizing a t:slim 2 insulin pump with Dexcom G6 continuous glucose monitoring system.  She is following with maternal-fetal medicine while she is pregnant. Ashia Underwood was asked to contact maternal-fetal medicine given her blood sugars are low at times.     CC: Type 1 Diabetes/ Hypothyroidism  Follow up    History of Present Illness     HPI:  27y.o. year old female with history of hypothyroidism and type 1 diabetes with retinopathy for follow-up visit.     She has hypothyroidism and takes levothyroxine 150 mcg 1 tablet - 6 days a week with 0.5 tablet on Sunday. She is 36 weeks gestation. Her most resent TSH from July 24, 2023 is 1.670 with a Free T4 of 1.50.     She has type 1 diabetes diagnosed 31 years ago. Ashia Underwood is currently pregnant on an insulin pump utilizing a t:slim to insulin pump with Dexcom G6 continuous glucose monitoring system integrated with her insulin pump.  She utilizes Humalog insulin in the insulin pump.  She is following with maternal-fetal medicine for her diabetes while pregnant.  She last visited maternal-fetal medicine was on March 27, 2023.  Last diabetic eye exam was in summer 2022  and last diabetic foot exam was April 2022.     She utilizes the Tandem T-Slim X 2 insulin pump with Dexcom G6 continuous glucose monitoring system integrated with her insulin pump.  She is currently not in control IQ as this was requested to be discontinued by maternal-fetal medicine recently. Ashia Underwood has noticed some lower blood sugars overnight at times.     Basal rates:  12 AM to 1 AM 0.35 units/h, 1 AM to 3 AM 0.7 units/h, 3 AM to 6 AM 1 unit/h, 6 AM to 9:15 AM 0.9 units/h, 9:15 AM to 11:45 AM 1 unit/h, 11:45 AM to 3:30 PM 0.7 units/h, 3:30 PM to 5:30 PM 0.85 units/h, 5:30 PM to 7:30 PM 0.88 units/h, 7:30 PM to 10 PM 1.1 units/h, 10 PM to 12 AM 0.65 units/h. Bolus rates:  12 AM to 6 AM 1 unit per 5 g carbohydrate, 6 AM to 11:45 AM 1 unit per 4 g carbohydrate, 11:45 AM to 5:30 PM 1 unit per 5 g carbohydrate, 5:30 PM to 7:30 PM 1 unit per 4.5 g carbohydrate, 7:30 PM to 12 AM 1 unit per 4 g carbohydrate. Insulin sensitivity factor:   12 AM to 1 AM 1 unit per 40 blood sugar points for target blood glucose of 100, 1 AM to 6 AM 1 unit per 45 blood sugar points for target blood glucose of 95, 6 AM to 5:30 PM 1 unit per 40 blood sugar points for target blood glucose of 100,  5:30 PM to 7:30 PM 1 unit per 37 blood sugar points for target blood glucose of 100, and 7:30 PM to 12 AM 1 unit per 45 blood sugar points for target blood glucose of 100. Insulin action:  4.3 hours.     Dexcom G6 continuous glucose monitoring system download was reviewed in the office from March 30 through April 12, 2023.  CGM is active 93% of the time.  Average glucose 99 mg/dL with standard deviation of 28 mg/dL.  86% of blood sugars are in target range, 1% high, 0% very high, 13% low, 1% very low. Review of Systems   Constitutional: Positive for fatigue. Negative for chills and fever. HENT: Negative. Negative for trouble swallowing and voice change. Eyes: Negative for photophobia, pain, discharge, redness, itching and visual disturbance. Respiratory: Negative. Negative for chest tightness and shortness of breath. Cardiovascular: Negative. Negative for chest pain. Gastrointestinal: Negative. Negative for abdominal pain, constipation, diarrhea and vomiting.    Endocrine: Negative for cold intolerance, heat intolerance, polydipsia, polyphagia and polyuria. Genitourinary: Negative. Musculoskeletal: Negative. Skin: Negative. Allergic/Immunologic: Negative. Neurological: Negative. Negative for dizziness, syncope, light-headedness and headaches. Hematological: Negative. Psychiatric/Behavioral: Negative. All other systems reviewed and are negative. Historical Information   Past Medical History:   Diagnosis Date   • Anxiety    • Diabetes mellitus ([address])     type 1; since age 1; on dexcom (since summer 2022) and Tandem T-slim (<2mos) with excellent trend down in A1 (5.5!); had rough go in college years (did not take care of her diabetes); follows with LAKESHA Dhaliwal.    • Elective procedure for unacceptable cosmetic appearance 2012    smart lipo   • Hypothyroidism 2012     Past Surgical History:   Procedure Laterality Date   • HAND SURGERY  2022     Social History   Social History     Substance and Sexual Activity   Alcohol Use Not Currently    Comment: I have not had a drink in about 1 year     Social History     Substance and Sexual Activity   Drug Use No     Social History     Tobacco Use   Smoking Status Never   Smokeless Tobacco Never     Family History:   Family History   Problem Relation Age of Onset   • Asthma Mother    • Hypertension Mother    • No Known Problems Father    • Asthma Sister    • No Known Problems Sister    • No Known Problems Maternal Grandmother    • No Known Problems Maternal Grandfather    • No Known Problems Paternal Grandmother    • No Known Problems Paternal Grandfather        Meds/Allergies   Current Outpatient Medications   Medication Sig Dispense Refill   • aspirin 81 mg chewable tablet 162 mg     • Continuous Blood Gluc  (Dexcom G6 ) TONA Use to monitor blood sugar 1 each 0   • Continuous Blood Gluc Sensor (Dexcom G6 Sensor) MISC Change every 10 days 3 each 12   • Continuous Blood Gluc Transmit (Dexcom G6 Transmitter) MISC Change every 90 days 1 each 3   • docusate sodium (COLACE) 100 mg capsule Take 100 mg by mouth 2 (two) times a day     • Doxylamine Succinate, Sleep, (UNISOM PO) Take by mouth     • folic acid (FOLVITE) 1 mg tablet Take 1 tablet (1,000 mcg total) by mouth daily 180 tablet 3   • Glucagon, rDNA, (Glucagon Emergency) 1 MG KIT Inject in the event of unconscious hypoglycemia. 1 kit 0   • HumaLOG 100 UNIT/ML injection USE VIA INSULIN PUMP UP  UNITS DAILY 30 mL 6   • levothyroxine 150 mcg tablet TAKE ONE TABLET BY MOUTH EVERY DAY 30 tablet 11   • Prenatal MV-Min-Fe Fum-FA-DHA (PRENATAL 1 PO) Take 1 tablet by mouth in the morning     • valACYclovir (VALTREX) 500 mg tablet Take 1 tablet (500 mg total) by mouth 2 (two) times a day 60 tablet 1     No current facility-administered medications for this visit. Allergies   Allergen Reactions   • Sulfamethoxazole      Other reaction(s): severe nausea/vomiting (03/13/2017)   • Trimethoprim      Other reaction(s): severe nausea/vomiting (03/13/2017)       Objective   Vitals: Blood pressure 120/80, pulse (!) 106, height 5' 2" (1.575 m), weight 92.4 kg (203 lb 12.8 oz), last menstrual period 11/16/2022, not currently breastfeeding. Physical Exam  Vitals reviewed. Constitutional:       Appearance: She is well-developed. HENT:      Head: Normocephalic and atraumatic. Eyes:      Conjunctiva/sclera: Conjunctivae normal.      Pupils: Pupils are equal, round, and reactive to light. Cardiovascular:      Rate and Rhythm: Normal rate and regular rhythm. Heart sounds: Normal heart sounds. Pulmonary:      Effort: Pulmonary effort is normal.      Breath sounds: Normal breath sounds. Abdominal:      General: Bowel sounds are normal.      Palpations: Abdomen is soft. Musculoskeletal:         General: Normal range of motion. Cervical back: Normal range of motion and neck supple. Skin:     General: Skin is warm and dry. Neurological:      Mental Status: She is alert and oriented to person, place, and time. Psychiatric:         Behavior: Behavior normal.         Thought Content:  Thought content normal.         Judgment: Judgment normal.       Lab Results:   Lab Results   Component Value Date/Time    Hemoglobin A1C 5.0 06/22/2023 09:26 AM    Hemoglobin A1C 4.9 04/27/2023 11:30 AM    Hemoglobin A1C 4.8 03/15/2023 12:00 AM    Hemoglobin A1C 5.2 02/02/2023 12:00 AM    Hemoglobin A1C 5.1 12/21/2022 10:35 AM    White Blood Cell Count 15.9 (H) 06/22/2023 09:22 AM    White Blood Cell Count 13.2 (H) 05/30/2023 09:52 AM    White Blood Cell Count 11.5 (H) 02/02/2023 10:11 AM    Hemoglobin 12.3 06/22/2023 09:22 AM    Hemoglobin 11.7 05/30/2023 09:52 AM    Hemoglobin 13.0 02/02/2023 10:11 AM    HCT 35.8 06/22/2023 09:22 AM    HCT 33.6 (L) 05/30/2023 09:52 AM    HCT 37.8 02/02/2023 10:11 AM    MCV 92 06/22/2023 09:22 AM    MCV 90 05/30/2023 09:52 AM    MCV 89 02/02/2023 10:11 AM    Platelet Count 697 13/21/6240 09:22 AM    Platelet Count 184 29/51/3788 09:52 AM    Platelet Count 694 92/33/2758 10:11 AM    BUN 10 06/22/2023 09:22 AM    BUN 11 12/21/2022 10:35 AM    BUN 13 10/26/2022 11:46 AM    Potassium 4.1 06/22/2023 09:22 AM    Potassium 3.9 12/21/2022 10:35 AM    Potassium 4.0 10/26/2022 11:46 AM    Chloride 103 06/22/2023 09:22 AM    Chloride 102 12/21/2022 10:35 AM    Chloride 101 10/26/2022 11:46 AM    CO2 23 06/22/2023 09:22 AM    CO2 23 12/21/2022 10:35 AM    CO2 26 10/26/2022 11:46 AM    Creatinine 0.69 06/22/2023 09:22 AM    Creatinine 0.86 12/21/2022 10:35 AM    Creatinine 0.85 10/26/2022 11:46 AM    AST 17 06/22/2023 09:22 AM    AST 17 12/21/2022 10:35 AM    AST 17 10/26/2022 11:46 AM    ALT 8 06/22/2023 09:22 AM    ALT 8 12/21/2022 10:35 AM    ALT 13 10/26/2022 11:46 AM    Protein, Total 6.7 06/22/2023 09:22 AM    Protein, Total 6.8 12/21/2022 10:35 AM    Protein, Total 7.1 10/26/2022 11:46 AM    Albumin 3.3 (L) 06/22/2023 09:22 AM    Albumin 3.8 12/21/2022 10:35 AM    Albumin 4.2 10/26/2022 11:46 AM    Globulin, Total 3.4 06/22/2023 09:22 AM    Globulin, Total 3.0 12/21/2022 10:35 AM    Globulin, Total 2.9 10/26/2022 11:46 AM    HDL 50 12/21/2022 10:35 AM    Triglycerides 62 12/21/2022 10:35 AM     Portions of the record may have been created with voice recognition software. Occasional wrong word or "sound a like" substitutions may have occurred due to the inherent limitations of voice recognition software. Read the chart carefully and recognize, using context, where substitutions have occurred.

## 2023-07-26 ENCOUNTER — ROUTINE PRENATAL (OUTPATIENT)
Dept: OBGYN CLINIC | Facility: CLINIC | Age: 33
End: 2023-07-26
Payer: COMMERCIAL

## 2023-07-26 ENCOUNTER — ROUTINE PRENATAL (OUTPATIENT)
Dept: PERINATAL CARE | Facility: OTHER | Age: 33
End: 2023-07-26
Payer: COMMERCIAL

## 2023-07-26 VITALS
SYSTOLIC BLOOD PRESSURE: 122 MMHG | HEIGHT: 62 IN | HEART RATE: 105 BPM | BODY MASS INDEX: 37.17 KG/M2 | DIASTOLIC BLOOD PRESSURE: 64 MMHG | WEIGHT: 202 LBS

## 2023-07-26 VITALS
HEART RATE: 123 BPM | BODY MASS INDEX: 37.06 KG/M2 | WEIGHT: 202.6 LBS | DIASTOLIC BLOOD PRESSURE: 68 MMHG | SYSTOLIC BLOOD PRESSURE: 112 MMHG

## 2023-07-26 DIAGNOSIS — Z3A.36 36 WEEKS GESTATION OF PREGNANCY: ICD-10-CM

## 2023-07-26 DIAGNOSIS — Z34.03 ENCOUNTER FOR SUPERVISION OF NORMAL FIRST PREGNANCY IN THIRD TRIMESTER: Primary | ICD-10-CM

## 2023-07-26 DIAGNOSIS — O24.013 PRE-EXISTING TYPE 1 DIABETES MELLITUS WITH RETINOPATHY DURING PREGNANCY IN THIRD TRIMESTER (HCC): Primary | ICD-10-CM

## 2023-07-26 DIAGNOSIS — E10.319 PRE-EXISTING TYPE 1 DIABETES MELLITUS WITH RETINOPATHY DURING PREGNANCY IN THIRD TRIMESTER (HCC): Primary | ICD-10-CM

## 2023-07-26 LAB
SL AMB  POCT GLUCOSE, UA: ABNORMAL
SL AMB POCT URINE PROTEIN: ABNORMAL

## 2023-07-26 PROCEDURE — 59025 FETAL NON-STRESS TEST: CPT | Performed by: NURSE PRACTITIONER

## 2023-07-26 PROCEDURE — 81002 URINALYSIS NONAUTO W/O SCOPE: CPT | Performed by: STUDENT IN AN ORGANIZED HEALTH CARE EDUCATION/TRAINING PROGRAM

## 2023-07-26 PROCEDURE — PNV: Performed by: STUDENT IN AN ORGANIZED HEALTH CARE EDUCATION/TRAINING PROGRAM

## 2023-07-26 NOTE — PROGRESS NOTES
[facility]: Ms. Leonard Nyhan was seen today for NST (found under the pregnancy episode) which I reviewed the RN assessment and agree.    Please don't hesitate to contact our office with any concerns or questions.  -LAKESHA Lucas

## 2023-07-26 NOTE — PROGRESS NOTES
Prenatal visit 36w0d  Denies cramping, bleeding, leakage of fluid. Normal fetal movement.    GBS done today   Breast pump not needed  Delivery consent signed  IOL scheduled for 8/4/23 at 8pm

## 2023-07-26 NOTE — PROGRESS NOTES
27 y.o.  at 36w0d, here for routine OB visit. Feeling well overall and without concerns. Good FM. Denies LOF, VB, contractions.       Problem List Items Addressed This Visit        Other    36 weeks gestation of pregnancy     -precautions reviewed  -GBS collected today  -prepregnancy BMI 33 with goal weight gain 11-20#: TWG = 18#        Other Visit Diagnoses     Encounter for supervision of normal first pregnancy in third trimester    -  Primary    Relevant Orders    Strep B DNA probe, amplification    POCT urine dip (Completed)

## 2023-07-26 NOTE — ASSESSMENT & PLAN NOTE
-precautions reviewed  -GBS collected today  -prepregnancy BMI 33 with goal weight gain 11-20#: TWG = 18#

## 2023-07-28 LAB — GP B STREP DNA SPEC QL NAA+PROBE: NEGATIVE

## 2023-07-29 ENCOUNTER — NURSE TRIAGE (OUTPATIENT)
Dept: OTHER | Facility: OTHER | Age: 33
End: 2023-07-29

## 2023-07-29 NOTE — TELEPHONE ENCOUNTER
Regardin weeks pregnant/diarrhea  ----- Message from Hu Erazo sent at 2023 10:49 AM EDT -----  "I am 36 weeks pregnant and I have diarrhea for the last 3 days not sure what to do"

## 2023-07-29 NOTE — TELEPHONE ENCOUNTER
Reason for Disposition  • SEVERE diarrhea (e.g., 7 or more times / day more than normal)    Answer Assessment - Initial Assessment Questions  1. DIARRHEA SEVERITY: "How bad is the diarrhea?" "How many extra stools have you had in the past 24 hours than normal?"     - NO DIARRHEA (SCALE 0)    - MILD (SCALE 1-3): Few loose or mushy BMs; increase of 1-3 stools over normal daily number of stools; mild increase in ostomy output. -  MODERATE (SCALE 4-7): Increase of 4-6 stools daily over normal; moderate increase in ostomy output. * SEVERE (SCALE 8-10; OR 'WORST POSSIBLE'): Increase of 7 or more stools daily over normal; moderate increase in ostomy output; incontinence. 15 times    2. ONSET: "When did the diarrhea begin?"     Wednesday    3. BM CONSISTENCY: "How loose or watery is the diarrhea?"    watery    4. VOMITING: "Are you also vomiting?" If Yes, ask: "How many times in the past 24 hours?"   No    5. ABDOMINAL PAIN: "Are you having any abdominal pain?" If Yes, ask: "What does it feel like?" (e.g., crampy, dull, intermittent, constant)   No    6. ABDOMINAL PAIN SEVERITY: If present, ask: "How bad is the pain?"  (e.g., Scale 1-10; mild, moderate, or severe)    - MILD (1-3): doesn't interfere with normal activities, abdomen soft and not tender to touch     - MODERATE (4-7): interferes with normal activities or awakens from sleep, tender to touch     - SEVERE (8-10): excruciating pain, doubled over, unable to do any normal activities    N/a    7. ORAL INTAKE: If vomiting, "Have you been able to drink liquids?" "How much fluids have you had in the past 24 hours?"  Yes    8. HYDRATION: "Any signs of dehydration?" (e.g., dry mouth [not just dry lips], too weak to stand, dizziness, new weight loss) "When did you last urinate?"   denies    9. EXPOSURE: "Have you traveled to a foreign country recently?" "Have you been exposed to anyone with diarrhea?" "Could you have eaten any food that was spoiled?"  No    10. ANTIBIOTIC USE: "Are you taking antibiotics now or have you taken antibiotics in the past 2 months?"     No    11. OTHER SYMPTOMS: "Do you have any other symptoms?" (e.g., fever, blood in stool)    Nausea and "feels drained."     12. PREGNANCY: "Is there any chance you are pregnant?" "When was your last menstrual period?"  36w3d    Good FM  Taking imodium as needed and eating bland food    Protocols used: DIARRHEA-ADULT-AH      Paged on call provider. Provider stated if any concerns for dehydration or decrease in FM, pt should be seen in triage. Otherwise pt can continue to monitor and follow up with office on Monday if not resolved. Informed patient and she verbalized understanding.

## 2023-08-01 NOTE — PROGRESS NOTES
Prenatal Visit   37w0d    PN1 completed  Nuchal completed   AFP completed  Level 2 completed  28 week labs completed   Consents signed  Breast pump ordered   Tdap UTD  GBS neg  Cervix check today     Denies LOF, VB, or CTX.   Pt has +FM  Patients urine is   Patient has no questions/concerns today

## 2023-08-02 ENCOUNTER — HOSPITAL ENCOUNTER (INPATIENT)
Facility: HOSPITAL | Age: 33
LOS: 5 days | Discharge: HOME | End: 2023-08-07
Attending: STUDENT IN AN ORGANIZED HEALTH CARE EDUCATION/TRAINING PROGRAM | Admitting: STUDENT IN AN ORGANIZED HEALTH CARE EDUCATION/TRAINING PROGRAM
Payer: COMMERCIAL

## 2023-08-02 ENCOUNTER — ROUTINE PRENATAL (OUTPATIENT)
Dept: OBGYN CLINIC | Facility: CLINIC | Age: 33
End: 2023-08-02

## 2023-08-02 ENCOUNTER — ROUTINE PRENATAL (OUTPATIENT)
Dept: PERINATAL CARE | Facility: OTHER | Age: 33
End: 2023-08-02
Payer: COMMERCIAL

## 2023-08-02 VITALS
BODY MASS INDEX: 37.49 KG/M2 | HEART RATE: 90 BPM | SYSTOLIC BLOOD PRESSURE: 126 MMHG | WEIGHT: 205 LBS | DIASTOLIC BLOOD PRESSURE: 78 MMHG

## 2023-08-02 VITALS — SYSTOLIC BLOOD PRESSURE: 138 MMHG | BODY MASS INDEX: 37.49 KG/M2 | DIASTOLIC BLOOD PRESSURE: 90 MMHG | WEIGHT: 205 LBS

## 2023-08-02 DIAGNOSIS — Z3A.37 37 WEEKS GESTATION OF PREGNANCY: ICD-10-CM

## 2023-08-02 DIAGNOSIS — O99.283 HYPOTHYROIDISM DURING PREGNANCY IN THIRD TRIMESTER: ICD-10-CM

## 2023-08-02 DIAGNOSIS — Z41.9 PATIENT-REQUESTED PROCEDURE: ICD-10-CM

## 2023-08-02 DIAGNOSIS — E10.319 PRE-EXISTING TYPE 1 DIABETES MELLITUS WITH RETINOPATHY DURING PREGNANCY IN THIRD TRIMESTER (HCC): Primary | ICD-10-CM

## 2023-08-02 DIAGNOSIS — B00.9 HERPES SIMPLEX VIRUS (HSV) INFECTION: ICD-10-CM

## 2023-08-02 DIAGNOSIS — E03.9 HYPOTHYROIDISM DURING PREGNANCY IN THIRD TRIMESTER: ICD-10-CM

## 2023-08-02 DIAGNOSIS — O24.013 PRE-EXISTING TYPE 1 DIABETES MELLITUS WITH RETINOPATHY DURING PREGNANCY IN THIRD TRIMESTER (HCC): ICD-10-CM

## 2023-08-02 DIAGNOSIS — Z98.891 STATUS POST PRIMARY LOW TRANSVERSE CESAREAN SECTION: Primary | ICD-10-CM

## 2023-08-02 DIAGNOSIS — E10.3493: ICD-10-CM

## 2023-08-02 DIAGNOSIS — O24.013 PRE-EXISTING TYPE 1 DIABETES MELLITUS WITH RETINOPATHY DURING PREGNANCY IN THIRD TRIMESTER (HCC): Primary | ICD-10-CM

## 2023-08-02 DIAGNOSIS — O16.3 ELEVATED BLOOD PRESSURE AFFECTING PREGNANCY IN THIRD TRIMESTER, ANTEPARTUM: ICD-10-CM

## 2023-08-02 DIAGNOSIS — E10.319 PRE-EXISTING TYPE 1 DIABETES MELLITUS WITH RETINOPATHY DURING PREGNANCY IN THIRD TRIMESTER (HCC): ICD-10-CM

## 2023-08-02 DIAGNOSIS — Z34.03 ENCOUNTER FOR SUPERVISION OF NORMAL FIRST PREGNANCY IN THIRD TRIMESTER: Primary | ICD-10-CM

## 2023-08-02 PROBLEM — O13.3 GESTATIONAL HYPERTENSION, THIRD TRIMESTER: Status: ACTIVE | Noted: 2023-06-21

## 2023-08-02 LAB
ALBUMIN SERPL BCP-MCNC: 3.2 G/DL (ref 3.5–5)
ALP SERPL-CCNC: 123 U/L (ref 34–104)
ALT SERPL W P-5'-P-CCNC: 9 U/L (ref 7–52)
ANION GAP SERPL CALCULATED.3IONS-SCNC: 9 MMOL/L
AST SERPL W P-5'-P-CCNC: 15 U/L (ref 13–39)
BILIRUB SERPL-MCNC: 0.37 MG/DL (ref 0.2–1)
BUN SERPL-MCNC: 11 MG/DL (ref 5–25)
CALCIUM ALBUM COR SERPL-MCNC: 9.9 MG/DL (ref 8.3–10.1)
CALCIUM SERPL-MCNC: 9.3 MG/DL (ref 8.4–10.2)
CHLORIDE SERPL-SCNC: 104 MMOL/L (ref 96–108)
CO2 SERPL-SCNC: 23 MMOL/L (ref 21–32)
CREAT SERPL-MCNC: 0.59 MG/DL (ref 0.6–1.3)
CREAT UR-MCNC: 34.6 MG/DL
ERYTHROCYTE [DISTWIDTH] IN BLOOD BY AUTOMATED COUNT: 12.6 % (ref 11.6–15.1)
GFR SERPL CREATININE-BSD FRML MDRD: 119 ML/MIN/1.73SQ M
GLUCOSE SERPL-MCNC: 125 MG/DL (ref 65–140)
GLUCOSE SERPL-MCNC: 56 MG/DL (ref 65–140)
GLUCOSE SERPL-MCNC: 59 MG/DL (ref 65–140)
HCT VFR BLD AUTO: 35.9 % (ref 34.8–46.1)
HGB BLD-MCNC: 12.5 G/DL (ref 11.5–15.4)
MCH RBC QN AUTO: 30.6 PG (ref 26.8–34.3)
MCHC RBC AUTO-ENTMCNC: 34.8 G/DL (ref 31.4–37.4)
MCV RBC AUTO: 88 FL (ref 82–98)
PLATELET # BLD AUTO: 236 THOUSANDS/UL (ref 149–390)
PMV BLD AUTO: 10.8 FL (ref 8.9–12.7)
POTASSIUM SERPL-SCNC: 3.8 MMOL/L (ref 3.5–5.3)
PROT SERPL-MCNC: 6.9 G/DL (ref 6.4–8.4)
PROT UR-MCNC: 6 MG/DL
PROT/CREAT UR: 0.17 MG/G{CREAT} (ref 0–0.1)
RBC # BLD AUTO: 4.08 MILLION/UL (ref 3.81–5.12)
SL AMB  POCT GLUCOSE, UA: 1
SL AMB POCT URINE PROTEIN: ABNORMAL
SODIUM SERPL-SCNC: 136 MMOL/L (ref 135–147)
WBC # BLD AUTO: 13.54 THOUSAND/UL (ref 4.31–10.16)

## 2023-08-02 PROCEDURE — 82570 ASSAY OF URINE CREATININE: CPT

## 2023-08-02 PROCEDURE — 59025 FETAL NON-STRESS TEST: CPT | Performed by: NURSE PRACTITIONER

## 2023-08-02 PROCEDURE — 99213 OFFICE O/P EST LOW 20 MIN: CPT

## 2023-08-02 PROCEDURE — 86901 BLOOD TYPING SEROLOGIC RH(D): CPT

## 2023-08-02 PROCEDURE — 86780 TREPONEMA PALLIDUM: CPT

## 2023-08-02 PROCEDURE — 80053 COMPREHEN METABOLIC PANEL: CPT

## 2023-08-02 PROCEDURE — 82948 REAGENT STRIP/BLOOD GLUCOSE: CPT

## 2023-08-02 PROCEDURE — NC001 PR NO CHARGE: Performed by: STUDENT IN AN ORGANIZED HEALTH CARE EDUCATION/TRAINING PROGRAM

## 2023-08-02 PROCEDURE — 86850 RBC ANTIBODY SCREEN: CPT

## 2023-08-02 PROCEDURE — 85027 COMPLETE CBC AUTOMATED: CPT

## 2023-08-02 PROCEDURE — 86900 BLOOD TYPING SEROLOGIC ABO: CPT

## 2023-08-02 PROCEDURE — 84156 ASSAY OF PROTEIN URINE: CPT

## 2023-08-02 RX ORDER — SODIUM CHLORIDE, SODIUM LACTATE, POTASSIUM CHLORIDE, CALCIUM CHLORIDE 600; 310; 30; 20 MG/100ML; MG/100ML; MG/100ML; MG/100ML
25 INJECTION, SOLUTION INTRAVENOUS CONTINUOUS
Status: DISCONTINUED | OUTPATIENT
Start: 2023-08-02 | End: 2023-08-04

## 2023-08-02 RX ORDER — VALACYCLOVIR HYDROCHLORIDE 500 MG/1
500 TABLET, FILM COATED ORAL 2 TIMES DAILY
Status: DISCONTINUED | OUTPATIENT
Start: 2023-08-02 | End: 2023-08-04

## 2023-08-02 RX ORDER — LEVOTHYROXINE SODIUM 0.15 MG/1
150 TABLET ORAL
Status: DISCONTINUED | OUTPATIENT
Start: 2023-08-03 | End: 2023-08-07 | Stop reason: HOSPADM

## 2023-08-02 RX ORDER — ONDANSETRON 2 MG/ML
4 INJECTION INTRAMUSCULAR; INTRAVENOUS EVERY 6 HOURS PRN
Status: DISCONTINUED | OUTPATIENT
Start: 2023-08-02 | End: 2023-08-04 | Stop reason: SDUPTHER

## 2023-08-02 NOTE — PROGRESS NOTES
[facility]: Ms. Ness Irby was seen today at 37w0d for NST (found under the pregnancy episode) which I reviewed the RN assessment and agree. See ultrasound report under "OB Procedures" tab. She is scheduled for cervical ripening on 8/4/23 in PM. She reports good fetal movement. She mainains close contact with our diabetic team. Please don't hesitate to contact our office with any concerns or questions.  -LAKESHA Rivera    . I spent 10 minutes devoted to patient care (3 min chart preparation, 4 minutes face to face and 3 minutes documenting).

## 2023-08-02 NOTE — ASSESSMENT & PLAN NOTE
Elevated BP x 2 in the office today  Prior elevations at 35 and 31 wks  Meets criteria for gHTN today- to L&D for IOL

## 2023-08-02 NOTE — H&P
H&P - Obstetrics   Lula Chris 27 y.o. female MRN: [id number]  Unit/Bed#: LD TRIAGE 2- Encounter: [id number]    Assessment and Plan:  27 y.o.  at 37w0d who is being admitted for induction of labor in the setting of gestational hypertension at 37w0d. By issue:  * 37 weeks gestation of pregnancy  Assessment & Plan  Cephalic by ultrasound. Clinical EFW by [facility]: 8lbs  GBS negative status  Plan for maldonado balloon/cytotec induction   Analgesia and/or epidural at patient request  Follow up CBC, Syphilis screening, blood type & antibody screen      Gestational hypertension, third trimester  Assessment & Plan  CBC/CMP wnl  P:Cr: 0.17  Continue to monitor BP  Induction for GHTN    Systolic (69RBO), XBC:172 , Min:120 , LKC:503   Diastolic (90JRM), DWI:83, Min:76, Max:90      Hypothyroidism during pregnancy in third trimester  Assessment & Plan  Home regimen of levothyroxine ordered    Herpes simplex virus (HSV) infection  Assessment & Plan  Valtrex ordered  Speculum exam to confirm no active lesions    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 Dariusz Stephens. This patient will be an INPATIENT and I certify the anticipated length of stay is >2 Midnights. History of Present Illness     Chief Complaint: "I had elevated blood pressures in the office"    History of Present Illness:  Lula Chris is a 27 y.o.  with an SHOAIB of 2023, by Last Menstrual Period at 37w0d weeks gestation who presents with complaint of elevated blood pressures in the office.  Originally, she had elevated pressures in this pregnancy at 31 weeks (130/90) and 35 weeks (120/90). She has met criteria for gestational hypertension and has been [facility]. Denies headache, vision changes, chest pain, SOB, abdominal pain, lower extremity pain or swelling. Contractions: None  Loss of fluid: None  Vaginal bleeding: None  Fetal movement: Present    ROS otherwise negative. Obstetrician: [facility]    Pregnancy complications:   1. Type I Diabetes with retinopathy  2. Hypothyroidism    OB History    Para Term  AB Living   1             SAB IAB Ectopic Multiple Live Births                  # Outcome Date GA Lbr Gordon/2nd Weight Sex Delivery Anes PTL Lv   1 Current                Baby complications/comments: none    Review of Systems  12-point ROS negative unless stated in HPI. Historical Information   Past Medical History:   Diagnosis Date   • Anxiety    • Diabetes mellitus ([address])     type 1; since age 1; on dexcom (since summer 2022) and Tandem T-slim (<2mos) with excellent trend down in A1 (5.5!); had rough go in college years (did not take care of her diabetes); follows with LAKESHA Dhaliwal.    • Elective procedure for unacceptable cosmetic appearance     smart lipo   • Hypothyroidism      Past Surgical History:   Procedure Laterality Date   • HAND SURGERY       Social History   Social History     Substance and Sexual Activity   Alcohol Use Not Currently    Comment: I have not had a drink in about 1 year     Social History     Substance and Sexual Activity   Drug Use No     Social History     Tobacco Use   Smoking Status Never   Smokeless Tobacco Never     Family History:   Family History   Problem Relation Age of Onset   • Asthma Mother    • Hypertension Mother    • No Known Problems Father    • Asthma Sister    • No Known Problems Sister    • No Known Problems Maternal Grandmother    • No Known Problems Maternal Grandfather    • No Known Problems Paternal Grandmother    • No Known Problems Paternal Grandfather        Meds/Allergies      Medications Prior to Admission   Medication   • Continuous Blood Gluc  (Dexcom G6 ) TONA   • Continuous Blood Gluc Sensor (Dexcom G6 Sensor) MISC   • Continuous Blood Gluc Transmit (Dexcom G6 Transmitter) MISC   • Doxylamine Succinate, Sleep, (UNISOM PO)   • folic acid (FOLVITE) 1 mg tablet   • HumaLOG 100 UNIT/ML injection   • levothyroxine 150 mcg tablet   • Prenatal MV-Min-Fe Fum-FA-DHA (PRENATAL 1 PO)   • valACYclovir (VALTREX) 500 mg tablet   • Glucagon, rDNA, (Glucagon Emergency) 1 MG KIT        Allergies   Allergen Reactions   • Sulfamethoxazole      Other reaction(s): severe nausea/vomiting (03/13/2017)   • Trimethoprim      Other reaction(s): severe nausea/vomiting (03/13/2017)       Objective:  Vitals: Blood pressure 135/88, pulse 100, last menstrual period 11/16/2022, not currently breastfeeding. There is no height or weight on file to calculate BMI.     Physical Exam  GEN: alert and oriented x 3, no apparent distress, appears well  CARDIAC: regular rate and rhythm  PULMONARY: normal effort, clear to auscultation bilaterally  ABDOMEN: gravid, soft, no tenderness   EXTREMITIES: nontender, no edema  FETAL ASSESSMENT:  Fetal heart rate: 130/moderate variability/15x15 accelerations/no decelerations; Category I  Phillips: irritability    Prenatal Labs:    Blood type: A+  Antibody screen: negative  HIV: non-reactive  Hepatitis B surface antigen: non-reactive  Hepatitis C antibody: non-reactive  Syphilis screening: negative  Gonorrhea/Chlamydia: negative/negative  Rubella: Immune  Varicella: Unknown  Urine culture: No growth  1 hour GTT: unknown, type I diabetic on insulin pump  Group B strep: negative  Last Hemoglobin: 12.5g/dL  Last Platelet count: 481O    Invasive Devices     None                     Mary Montana MD   PGY-I, OBGYN  8/2/2023  11:34 PM

## 2023-08-02 NOTE — ASSESSMENT & PLAN NOTE
Followed by DP  Compliant with twice weekly  screening  Lab Results   Component Value Date    HGBA1C 5.0 2023

## 2023-08-02 NOTE — PROGRESS NOTES
Pt presents today for a routine OB visit. She is feeling well today. Reports normal fetal movement. Denies VB, LOF, contractions, HA, blurred vision, swelling. She has IOL scheduled for 23. However her BP in the office today is elevated at 140/82. Repeat /90. She has had two instances of elevated BP readings earlier in the pregnancy- 35 weeks- 120/90; 31 wks- 130/90. Discussed case with Dr. Brenad Barrios - pt does meet criteria for gHTN at this time. We reviewed the recommendation for gHTN is for IOL at 37w0d or at time of diagnosis. Since she is 37w0d recommended she report to [facility] for labs and IOL. On call physician notified. Attempted to check cervix today. Pt admits she does not tolerate bimanual exams or cervical checks well. Cervix posterior on exam. Difficult to determine dilation today due to patient discomfort with exam    37 weeks gestation of pregnancy  See plan above- to L&D for IOL as pt now meets gHTN criteria.  Discussed with Dr. Brenda Barriso    Herpes simplex virus (HSV) infection  Taking valtrex    Elevated blood pressure affecting pregnancy in third trimester, antepartum  Elevated BP x 2 in the office today  Prior elevations at 35 and 31 wks  Meets criteria for gHTN today- to L&D for IOL    Pre-existing type 1 diabetes mellitus with retinopathy during pregnancy in third trimester ([address])  Followed by DP  Compliant with twice weekly  screening  Lab Results   Component Value Date    HGBA1C 5.0 2023       Hypothyroidism during pregnancy in third trimester

## 2023-08-03 ENCOUNTER — ANESTHESIA (INPATIENT)
Dept: LABOR AND DELIVERY | Facility: HOSPITAL | Age: 33
End: 2023-08-03
Payer: COMMERCIAL

## 2023-08-03 ENCOUNTER — ANESTHESIA EVENT (INPATIENT)
Dept: LABOR AND DELIVERY | Facility: HOSPITAL | Age: 33
End: 2023-08-03
Payer: COMMERCIAL

## 2023-08-03 DIAGNOSIS — B00.9 HERPES SIMPLEX VIRUS (HSV) INFECTION: ICD-10-CM

## 2023-08-03 LAB
ABO GROUP BLD: NORMAL
BLD GP AB SCN SERPL QL: NEGATIVE
GLUCOSE SERPL-MCNC: 73 MG/DL (ref 65–140)
GLUCOSE SERPL-MCNC: 75 MG/DL (ref 65–140)
GLUCOSE SERPL-MCNC: 83 MG/DL (ref 65–140)
HOLD SPECIMEN: NORMAL
RH BLD: POSITIVE
SPECIMEN EXPIRATION DATE: NORMAL
TREPONEMA PALLIDUM IGG+IGM AB [PRESENCE] IN SERUM OR PLASMA BY IMMUNOASSAY: NORMAL

## 2023-08-03 PROCEDURE — 3E033VJ INTRODUCTION OF OTHER HORMONE INTO PERIPHERAL VEIN, PERCUTANEOUS APPROACH: ICD-10-PCS | Performed by: OBSTETRICS & GYNECOLOGY

## 2023-08-03 PROCEDURE — 82948 REAGENT STRIP/BLOOD GLUCOSE: CPT

## 2023-08-03 RX ORDER — OXYTOCIN/RINGER'S LACTATE 30/500 ML
1-30 PLASTIC BAG, INJECTION (ML) INTRAVENOUS
Status: DISCONTINUED | OUTPATIENT
Start: 2023-08-03 | End: 2023-08-04

## 2023-08-03 RX ORDER — VALACYCLOVIR HYDROCHLORIDE 500 MG/1
500 TABLET, FILM COATED ORAL 2 TIMES DAILY
Qty: 180 TABLET | Refills: 0 | Status: SHIPPED | OUTPATIENT
Start: 2023-08-03 | End: 2023-11-01

## 2023-08-03 RX ORDER — DIPHENHYDRAMINE HYDROCHLORIDE 50 MG/ML
25 INJECTION INTRAMUSCULAR; INTRAVENOUS EVERY 6 HOURS PRN
Status: DISCONTINUED | OUTPATIENT
Start: 2023-08-03 | End: 2023-08-04

## 2023-08-03 RX ORDER — BUPIVACAINE HYDROCHLORIDE 2.5 MG/ML
INJECTION, SOLUTION EPIDURAL; INFILTRATION; INTRACAUDAL AS NEEDED
Status: DISCONTINUED | OUTPATIENT
Start: 2023-08-03 | End: 2023-08-04

## 2023-08-03 RX ORDER — ONDANSETRON 2 MG/ML
4 INJECTION INTRAMUSCULAR; INTRAVENOUS EVERY 4 HOURS PRN
Status: DISCONTINUED | OUTPATIENT
Start: 2023-08-03 | End: 2023-08-04

## 2023-08-03 RX ORDER — OXYTOCIN/RINGER'S LACTATE 30/500 ML
1-30 PLASTIC BAG, INJECTION (ML) INTRAVENOUS
Status: DISCONTINUED | OUTPATIENT
Start: 2023-08-03 | End: 2023-08-03 | Stop reason: SDUPTHER

## 2023-08-03 RX ORDER — METOCLOPRAMIDE HYDROCHLORIDE 5 MG/ML
5 INJECTION INTRAMUSCULAR; INTRAVENOUS EVERY 6 HOURS PRN
Status: DISCONTINUED | OUTPATIENT
Start: 2023-08-03 | End: 2023-08-04

## 2023-08-03 RX ORDER — DOCUSATE SODIUM 100 MG/1
100 CAPSULE, LIQUID FILLED ORAL 2 TIMES DAILY
Status: DISCONTINUED | OUTPATIENT
Start: 2023-08-03 | End: 2023-08-03

## 2023-08-03 RX ORDER — LANOLIN ALCOHOL/MO/W.PET/CERES
400 CREAM (GRAM) TOPICAL ONCE
Status: COMPLETED | OUTPATIENT
Start: 2023-08-03 | End: 2023-08-03

## 2023-08-03 RX ORDER — DOCUSATE SODIUM 100 MG/1
100 CAPSULE, LIQUID FILLED ORAL 2 TIMES DAILY
Status: DISCONTINUED | OUTPATIENT
Start: 2023-08-03 | End: 2023-08-04

## 2023-08-03 RX ADMIN — LEVOTHYROXINE SODIUM 150 MCG: 75 TABLET ORAL at 07:16

## 2023-08-03 RX ADMIN — VALACYCLOVIR 500 MG: 500 TABLET, FILM COATED ORAL at 01:18

## 2023-08-03 RX ADMIN — ONDANSETRON 4 MG: 2 INJECTION INTRAMUSCULAR; INTRAVENOUS at 11:45

## 2023-08-03 RX ADMIN — VALACYCLOVIR 500 MG: 500 TABLET, FILM COATED ORAL at 18:03

## 2023-08-03 RX ADMIN — Medication 1 TABLET: at 01:18

## 2023-08-03 RX ADMIN — Medication 50 MCG: at 06:15

## 2023-08-03 RX ADMIN — SODIUM CHLORIDE, SODIUM LACTATE, POTASSIUM CHLORIDE, AND CALCIUM CHLORIDE 125 ML/HR: .6; .31; .03; .02 INJECTION, SOLUTION INTRAVENOUS at 11:21

## 2023-08-03 RX ADMIN — SODIUM CHLORIDE, SODIUM LACTATE, POTASSIUM CHLORIDE, AND CALCIUM CHLORIDE 999 ML/HR: .6; .31; .03; .02 INJECTION, SOLUTION INTRAVENOUS at 10:30

## 2023-08-03 RX ADMIN — FOLIC ACID TAB 400 MCG 400 MCG: 400 TAB at 01:21

## 2023-08-03 RX ADMIN — Medication 2 MILLI-UNITS/MIN: at 20:52

## 2023-08-03 RX ADMIN — ROPIVACAINE HYDROCHLORIDE: 2 INJECTION, SOLUTION EPIDURAL; INFILTRATION at 19:47

## 2023-08-03 RX ADMIN — ONDANSETRON 4 MG: 2 INJECTION INTRAMUSCULAR; INTRAVENOUS at 18:03

## 2023-08-03 RX ADMIN — ROPIVACAINE HYDROCHLORIDE: 2 INJECTION, SOLUTION EPIDURAL; INFILTRATION at 11:47

## 2023-08-03 RX ADMIN — Medication 25 MCG: at 12:49

## 2023-08-03 RX ADMIN — VALACYCLOVIR 500 MG: 500 TABLET, FILM COATED ORAL at 11:21

## 2023-08-03 RX ADMIN — DOCUSATE SODIUM 100 MG: 100 CAPSULE, LIQUID FILLED ORAL at 06:15

## 2023-08-03 RX ADMIN — BUPIVACAINE HYDROCHLORIDE 1.2 ML: 2.5 INJECTION, SOLUTION EPIDURAL; INFILTRATION; INTRACAUDAL; PERINEURAL at 11:44

## 2023-08-03 RX ADMIN — SODIUM CHLORIDE, SODIUM LACTATE, POTASSIUM CHLORIDE, AND CALCIUM CHLORIDE 125 ML/HR: .6; .31; .03; .02 INJECTION, SOLUTION INTRAVENOUS at 20:55

## 2023-08-03 NOTE — OB LABOR/OXYTOCIN SAFETY PROGRESS
Labor Progress Note - Fang Farrell 27 y.o. female MRN: [id number]    Unit/Bed#: -01 Encounter: [id number]       Contraction Frequency (minutes): 3-6  Contraction Quality: Mild  Tachysystole: No   Cervical Dilation: Closed        Cervical Effacement: 0  Fetal Station: Floating  Baseline Rate: 140 bpm  Fetal Heart Rate: 151 BPM  FHR Category: Category I               Vital Signs:   Vitals:    08/03/23 0710   BP: 123/78   Pulse: (!) 113   Resp: 16   Temp: 98.3 °F (36.8 °C)       Notes/comments: We met Fang at bedside and discussed her labor progress thus far. She has a hard time tolerating cervical checks, and even with coaching, was not able to tolerate an exam.  We discussed best ways to induce labor, and after discussion, she would like to proceed with early epidural placement prior to Andres balloon. We also discussed T1DM, and the plan for insulin management during labor. Wants to keep her pump on until active labor. Once in active labor, discussed plan for switching to insulin drip. Patient verbalized understanding and all questions were answered.     Patient discussed with Dr. Tiffany Hebert DO 8/3/2023 10:18 AM

## 2023-08-03 NOTE — ASSESSMENT & PLAN NOTE
• Routine postpartum care  • Encourage ambulation  • Encourage familial bonding  • Lactation support as needed  • Lovenox 40 daily, SCDs  • Passed her void trial  • Pain: Motrin/Tylenol around the clock, oxycodone if needed

## 2023-08-03 NOTE — OB LABOR/OXYTOCIN SAFETY PROGRESS
Labor Progress Note - Fang Farrell 27 y.o. female MRN: [id number]    Unit/Bed#: -01 Encounter: [id number]       Contraction Frequency (minutes): 3-5  Contraction Quality: Moderate  Tachysystole: No   Cervical Dilation: Fingertip        Cervical Effacement: 0  Fetal Station: Ballotable  Baseline Rate: 135 bpm  Fetal Heart Rate: 145 BPM  FHR Category: Category I               Vital Signs:   Vitals:    08/03/23 1201   BP: 114/73   Pulse: 94   Resp:    Temp:    SpO2:        Notes/comments:     Do Velasco is now comfortable with her epidural.    PROCEDURE:  GONZALEZ BALLOON PLACEMENT    A 24F gonzalez with a 30cc balloon was selected, a speculum examination was performed and the cervix was located. A gonzalez balloon was introduced over sterile gloved hands. Balloon advanced through cervix with a ringed forceps beyond the internal cervical os. A small amount amount of sterile saline solution was instilled in the balloon to confirm placement. Balloon was not beyond the internal cervical os as patient is only fingertip dilated and has a very firm cervix. Was then attempted to be digitally placed, and placement was unsuccessful due to how firm and posterior her cervix is.   We will place another vaginal Cytotec and then reattempt Gonzalez after her cervix is more ripe        Dannie Trevino DO 8/3/2023 12:32 PM yes

## 2023-08-03 NOTE — ANESTHESIA PROCEDURE NOTES
CSE Block    Patient location during procedure: OB  Start time: 8/3/2023 11:45 AM  Reason for block: procedure for pain and at surgeon's request  Staffing  Performed by: Mitesh Wilde MD  Authorized by: Mitesh Wilde MD    Preanesthetic Checklist  Completed: patient identified, IV checked, site marked, risks and benefits discussed, surgical consent, monitors and equipment checked, pre-op evaluation and timeout performed  CSE  Patient position: sitting  Prep: ChloraPrep  Patient monitoring: cardiac monitor and continuous pulse ox  Approach: midline  Spinal Needle  Needle type: pencil-tip   Needle gauge: 27 G  Needle length: 10 cm  Epidural Needle  Injection technique: LEVAR saline  Needle type: Tuohy   Needle gauge: 18 G  Needle insertion depth: 6 cm  Location: lumbar (1-5)  Catheter  Catheter type: side hole  Catheter size: 20 G  Catheter at skin depth: 11 cm  Test dose: negative  Assessment  Injection Assessment:  negative aspiration for heme, no paresthesia on injection, positive aspiration for clear CSF and no pain on injection.

## 2023-08-03 NOTE — ANESTHESIA PREPROCEDURE EVALUATION
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, bilateral (HCC)      GI/HEPATIC (within normal limits)      /RENAL (within normal limits)      GYN   (+) 37 weeks gestation of pregnancy      HEMATOLOGY (within normal limits)      MUSCULOSKELETAL (within normal limits)      NEURO/PSYCH (within normal limits)      PULMONARY (within normal limits)      Cardiovascular and Mediastinum   (+) Gestational hypertension, third trimester      Endocrine   (+) Mild nonproliferative diabetic retinopathy of left eye with macular edema associated with type 1 diabetes mellitus (HCC)      Other   (+) Anxiety during pregnancy in third trimester, antepartum   (+) BMI 36.0-36.9,adult   (+) Herpes simplex virus (HSV) infection   (+) Maternal obesity, antepartum, third trimester        Physical Exam    Airway    Mallampati score: II  TM Distance: >3 FB  Neck ROM: full     Dental   No notable dental hx     Cardiovascular  Rhythm: regular, Rate: normal, Cardiovascular exam normal    Pulmonary  Pulmonary exam normal Breath sounds clear to auscultation,     Other Findings        Anesthesia Plan  ASA Score- 2     Anesthesia Type- epidural with ASA Monitors. Additional Monitors:   Airway Plan:           Plan Factors-Exercise tolerance (METS): >4 METS. Chart reviewed. EKG reviewed. Imaging results reviewed. Existing labs reviewed. Patient summary reviewed. Induction-     Postoperative Plan-     Informed Consent- Anesthetic plan and risks discussed with patient. I personally reviewed this patient with the CRNA.  Discussed and agreed on the Anesthesia Plan with the CRNA.. Recent labs personally reviewed:  Lab Results   Component Value Date    WBC 13.54 (H) 08/02/2023    HGB 12.5 08/02/2023     08/02/2023     Lab Results   Component Value Date    K 3.8 08/02/2023    BUN 11 08/02/2023    CREATININE 0.59 (L) 08/02/2023     No results found for: "PTT"   No results found for: "INR"    Blood type A    Lab Results   Component Value Date    HGBA1C 5.0 06/22/2023       I, Mariia Person MD, have personally seen and evaluated the patient prior to anesthetic care. I have reviewed the pre-anesthetic record, and other medical records if appropriate to the anesthetic care. If a CRNA is involved in the case, I have reviewed the CRNA assessment, if present, and agree. Risks/benefits and alternatives discussed with patient including possible PONV, sore throat, and possibility of rare anesthetic and surgical emergencies.

## 2023-08-03 NOTE — PLAN OF CARE
Problem: BIRTH - VAGINAL/ SECTION  Goal: Fetal and maternal status remain reassuring during the birth process  Description: INTERVENTIONS:  - Monitor vital signs  - Monitor fetal heart rate  - Monitor uterine activity  - Monitor labor progression (vaginal delivery)  - DVT prophylaxis  - Antibiotic prophylaxis  Outcome: Progressing  Goal: Emotionally satisfying birthing experience for mother/fetus  Description: Interventions:  - Assess, plan, implement and evaluate the nursing care given to the patient in labor  - Advocate the philosophy that each childbirth experience is a unique experience and support the family's chosen level of involvement and control during the labor process   - Actively participate in both the patient's and family's teaching of the birth process  - Consider cultural, Taoism and age-specific factors and plan care for the patient in labor  Outcome: Progressing     Problem: Knowledge Deficit  Goal: Verbalizes understanding of labor plan  Description: Assess patient/family/caregiver's baseline knowledge level and ability to understand information. Provide education via patient/family/caregiver's preferred learning method at appropriate level of understanding. 1. Provide teaching at level of understanding. 2. Provide teaching via preferred learning method(s). Outcome: Progressing  Goal: Patient/family/caregiver demonstrates understanding of disease process, treatment plan, medications, and discharge instructions  Description: Complete learning assessment and assess knowledge base. Interventions:  - Provide teaching at level of understanding  - Provide teaching via preferred learning methods  Outcome: Progressing     Problem: Labor & Delivery  Goal: Manages discomfort  Description: Assess and monitor for signs and symptoms of discomfort. Assess patient's pain level regularly and per hospital policy. Administer medications as ordered.  Support use of nonpharmacological methods to help control pain such as distraction, imagery, relaxation, and application of heat and cold. Collaborate with interdisciplinary team and patient to determine appropriate pain management plan. 1. Include patient in decisions related to comfort. 2. Offer non-pharmacological pain management interventions. 3. Report ineffective pain management to physician. Outcome: Progressing  Goal: Patient vital signs are stable  Description: 1. Assess vital signs - vaginal delivery.   Outcome: Progressing     Problem: PAIN - ADULT  Goal: Verbalizes/displays adequate comfort level or baseline comfort level  Description: Interventions:  - Encourage patient to monitor pain and request assistance  - Assess pain using appropriate pain scale  - Administer analgesics based on type and severity of pain and evaluate response  - Implement non-pharmacological measures as appropriate and evaluate response  - Consider cultural and social influences on pain and pain management  - Notify physician/advanced practitioner if interventions unsuccessful or patient reports new pain  Outcome: Progressing     Problem: INFECTION - ADULT  Goal: Absence or prevention of progression during hospitalization  Description: INTERVENTIONS:  - Assess and monitor for signs and symptoms of infection  - Monitor lab/diagnostic results  - Monitor all insertion sites, i.e. indwelling lines, tubes, and drains  - Monitor endotracheal if appropriate and nasal secretions for changes in amount and color  - Rogers appropriate cooling/warming therapies per order  - Administer medications as ordered  - Instruct and encourage patient and family to use good hand hygiene technique  - Identify and instruct in appropriate isolation precautions for identified infection/condition  Outcome: Progressing  Goal: Absence of fever/infection during neutropenic period  Description: INTERVENTIONS:  - Monitor WBC    Outcome: Progressing     Problem: SAFETY ADULT  Goal: Patient will remain free of falls  Description: INTERVENTIONS:  - Educate patient/family on patient safety including physical limitations  - Instruct patient to call for assistance with activity   - Consult OT/PT to assist with strengthening/mobility   - Keep Call bell within reach  - Keep bed low and locked with side rails adjusted as appropriate  - Keep care items and personal belongings within reach  - Initiate and maintain comfort rounds  - Make Fall Risk Sign visible to staff  - Offer Toileting every  Hours, in advance of need  - Initiate/Maintain alarm  - Obtain necessary fall risk management equipment:   - Apply yellow socks and bracelet for high fall risk patients  - Consider moving patient to room near nurses station  Outcome: Progressing  Goal: Maintain or return to baseline ADL function  Description: INTERVENTIONS:  -  Assess patient's ability to carry out ADLs; assess patient's baseline for ADL function and identify physical deficits which impact ability to perform ADLs (bathing, care of mouth/teeth, toileting, grooming, dressing, etc.)  - Assess/evaluate cause of self-care deficits   - Assess range of motion  - Assess patient's mobility; develop plan if impaired  - Assess patient's need for assistive devices and provide as appropriate  - Encourage maximum independence but intervene and supervise when necessary  - Involve family in performance of ADLs  - Assess for home care needs following discharge   - Consider OT consult to assist with ADL evaluation and planning for discharge  - Provide patient education as appropriate  Outcome: Progressing  Goal: Maintains/Returns to pre admission functional level  Description: INTERVENTIONS:  - Perform BMAT or MOVE assessment daily.   - Set and communicate daily mobility goal to care team and patient/family/caregiver. - Collaborate with rehabilitation services on mobility goals if consulted  - Perform Range of Motion  times a day. - Reposition patient every  hours.   - Dangle patient  times a day  - Stand patient  times a day  - Ambulate patient  times a day  - Out of bed to chair  times a day   - Out of bed for meals times a day  - Out of bed for toileting  - Record patient progress and toleration of activity level   Outcome: Progressing     Problem: DISCHARGE PLANNING  Goal: Discharge to home or other facility with appropriate resources  Description: INTERVENTIONS:  - Identify barriers to discharge w/patient and caregiver  - Arrange for needed discharge resources and transportation as appropriate  - Identify discharge learning needs (meds, wound care, etc.)  - Arrange for interpretive services to assist at discharge as needed  - Refer to Case Management Department for coordinating discharge planning if the patient needs post-hospital services based on physician/advanced practitioner order or complex needs related to functional status, cognitive ability, or social support system  Outcome: Progressing

## 2023-08-03 NOTE — OB LABOR/OXYTOCIN SAFETY PROGRESS
Labor Progress Note - Fang Farrell 27 y.o. female MRN: [id number]    Unit/Bed#: -01 Encounter: [id number]       Contraction Frequency (minutes): 3-5  Contraction Quality: Moderate  Tachysystole: No   Cervical Dilation: Fingertip        Cervical Effacement: 0  Fetal Station: Ballotable  Baseline Rate: 135 bpm  Fetal Heart Rate: 145 BPM  FHR Category: Category I               Vital Signs:   Vitals:    08/03/23 1201   BP: 114/73   Pulse: 94   Resp:    Temp:    SpO2:        Notes/comments:     Vaginal Cytotec placed, patient tolerated well  We will start every hour glucose checks        Bryson Garcia DO 8/3/2023 12:53 PM

## 2023-08-03 NOTE — OB LABOR/OXYTOCIN SAFETY PROGRESS
Oxytocin Safety Progress Check Note - Shawn Rehman Bljoan 27 y.o. female MRN: [id number]    Unit/Bed#: -01 Encounter: [id number]       Contraction Frequency (minutes): high frequency, low amplitude  Contraction Quality: Mild  Tachysystole: No   Cervical Dilation: Fingertip        Cervical Effacement: 0  Fetal Station: -3  Baseline Rate: 125 bpm  Fetal Heart Rate: 145 BPM  FHR Category: Category II               Vital Signs:   Vitals:    08/03/23 1519   BP: 122/78   Pulse: 92   Resp:    Temp:    SpO2:        Notes/comments:     PROCEDURE:  MALDONADO BALLOON PLACEMENT    A 24F maldonado with a 30cc balloon was selected, SVE was performed and cervix was located, maldonado was introduced over sterile gloved hands. Balloon advanced through cervix beyond the internal cervical os. A small amount amount of sterile saline solution was instilled in the balloon to confirm placement. Placement was confirmed to be beyond the internal cervical os. A total of 60cc of sterile saline solution was placed into the balloon. Pt tolerated well. Instructions left with RN to place maldonado to gravity with a 1L bag of IV fluid. Notify DO when maldonado dislodged. Will reevaluate in 2 hours if we will go straight to pitocin or cytotec    Blood sugars were reviewed on her Dexcom, all between 70s-110s.         Truong Joseph DO 8/3/2023 3:40 PM

## 2023-08-03 NOTE — ASSESSMENT & PLAN NOTE
Lab Results   Component Value Date    HGBA1C 5.0 06/22/2023       Recent Labs     08/04/23  1548 08/04/23  1620 08/04/23  1732 08/04/23  1834   POCGLU 74 80 93 126       Blood Sugar Average: Last 72 hrs:  (P) [id number]     Patient has insulin pump placed  Using insulin pump pp  Patient is checking her own sugars and refusing checks from nursing staff

## 2023-08-03 NOTE — OB LABOR/OXYTOCIN SAFETY PROGRESS
Labor Progress Note - Fang Farrell 27 y.o. female MRN: [id number]    Unit/Bed#: -01 Encounter: [id number]    Dose (tenzin-units/min) Oxytocin: 0 tenzin-units/min  Contraction Frequency (minutes): high frequency, low amplitude  Contraction Quality: Mild  Tachysystole: No   Cervical Dilation: 3-4        Cervical Effacement: 30  Fetal Station: Floating  Baseline Rate: 125 bpm  Fetal Heart Rate: 145 BPM  FHR Category: Category I               Vital Signs:   Vitals:    23 1815   BP: 105/73   Pulse:    Resp:    Temp:    SpO2:        Notes/comments:    Andres balloon dislodged. Pitocin not yet started. Jericho is comfortable with her epidural.   SVE as above. She will eat dinner, bolus her insulin, continue insulin pump. Will start pitocin titration after she eats. Had discussion with Jericho and Javier Iglesias about expectations of induction of labor. We discussed that the next stage will potentially be a long stage, especially based on this cervical check. We briefly discussed reasons for . All questions answered.       Kaylee Joseph MD 8/3/2023 7:25 PM

## 2023-08-03 NOTE — ASSESSMENT & PLAN NOTE
CBC/CMP wnl  P:Cr: 0.17  Continue to monitor BP  Induction for GHTN    Systolic (53ZTN), GXJ:007 , Min:109 , NSQ:638   Diastolic (58GMG), LWE:74, Min:67, Max:81

## 2023-08-03 NOTE — OB LABOR/OXYTOCIN SAFETY PROGRESS
Labor Progress Note - Fang Farrell 27 y.o. female MRN: [id number]    Unit/Bed#: -01 Encounter: [id number]       Contraction Frequency (minutes): 5  Contraction Quality: Mild  Tachysystole: No                  Baseline Rate: 135 bpm  Fetal Heart Rate: 151 BPM  FHR Category: Category I               Vital Signs:   Vitals:    08/03/23 0710   BP: 123/78   Pulse: (!) 113   Resp: 16   Temp: 98.3 °F (36.8 °C)       Notes/comments:     Went to check on Fang and discuss options for insulin management, however she is sound asleep.          Dannie Trevino DO 8/3/2023 9:05 AM

## 2023-08-04 PROBLEM — Z98.891 STATUS POST PRIMARY LOW TRANSVERSE CESAREAN SECTION: Status: ACTIVE | Noted: 2023-01-12

## 2023-08-04 LAB
BASE EXCESS BLDCOA CALC-SCNC: -4.5 MMOL/L (ref 3–11)
BASE EXCESS BLDCOV CALC-SCNC: -3.7 MMOL/L (ref 1–9)
GLUCOSE SERPL-MCNC: 126 MG/DL (ref 65–140)
GLUCOSE SERPL-MCNC: 61 MG/DL (ref 65–140)
GLUCOSE SERPL-MCNC: 74 MG/DL (ref 65–140)
GLUCOSE SERPL-MCNC: 80 MG/DL (ref 65–140)
GLUCOSE SERPL-MCNC: 93 MG/DL (ref 65–140)
GLUCOSE SERPL-MCNC: 96 MG/DL (ref 65–140)
HCO3 BLDCOA-SCNC: 25.7 MMOL/L (ref 17.3–27.3)
HCO3 BLDCOV-SCNC: 24.2 MMOL/L (ref 12.2–28.6)
O2 CT VFR BLDCOA CALC: 9.1 ML/DL
OXYHGB MFR BLDCOA: 38 %
OXYHGB MFR BLDCOV: 36.4 %
PCO2 BLDCOA: 68.9 MM[HG] (ref 30–60)
PCO2 BLDCOV: 54.2 MM HG (ref 27–43)
PH BLDCOA: 7.19 [PH] (ref 7.23–7.43)
PH BLDCOV: 7.27 [PH] (ref 7.19–7.49)
PO2 BLDCOA: 19.8 MM HG (ref 5–25)
PO2 BLDCOV: 18.4 MM HG (ref 15–45)
SAO2 % BLDCOV: 8.3 ML/DL

## 2023-08-04 PROCEDURE — 10907ZC DRAINAGE OF AMNIOTIC FLUID, THERAPEUTIC FROM PRODUCTS OF CONCEPTION, VIA NATURAL OR ARTIFICIAL OPENING: ICD-10-PCS | Performed by: OBSTETRICS & GYNECOLOGY

## 2023-08-04 PROCEDURE — 59510 CESAREAN DELIVERY: CPT | Performed by: STUDENT IN AN ORGANIZED HEALTH CARE EDUCATION/TRAINING PROGRAM

## 2023-08-04 PROCEDURE — 82948 REAGENT STRIP/BLOOD GLUCOSE: CPT

## 2023-08-04 PROCEDURE — NC001 PR NO CHARGE: Performed by: OBSTETRICS & GYNECOLOGY

## 2023-08-04 PROCEDURE — 82805 BLOOD GASES W/O2 SATURATION: CPT | Performed by: OBSTETRICS & GYNECOLOGY

## 2023-08-04 RX ORDER — SIMETHICONE 80 MG
80 TABLET,CHEWABLE ORAL 4 TIMES DAILY PRN
Status: DISCONTINUED | OUTPATIENT
Start: 2023-08-04 | End: 2023-08-07 | Stop reason: HOSPADM

## 2023-08-04 RX ORDER — CALCIUM CARBONATE 500 MG/1
1000 TABLET, CHEWABLE ORAL DAILY PRN
Status: DISCONTINUED | OUTPATIENT
Start: 2023-08-04 | End: 2023-08-07 | Stop reason: HOSPADM

## 2023-08-04 RX ORDER — DIAPER,BRIEF,INFANT-TODD,DISP
1 EACH MISCELLANEOUS DAILY PRN
Status: DISCONTINUED | OUTPATIENT
Start: 2023-08-04 | End: 2023-08-07 | Stop reason: HOSPADM

## 2023-08-04 RX ORDER — ONDANSETRON 2 MG/ML
4 INJECTION INTRAMUSCULAR; INTRAVENOUS EVERY 8 HOURS PRN
Status: DISCONTINUED | OUTPATIENT
Start: 2023-08-04 | End: 2023-08-07 | Stop reason: HOSPADM

## 2023-08-04 RX ORDER — TRANEXAMIC ACID 10 MG/ML
INJECTION, SOLUTION INTRAVENOUS AS NEEDED
Status: DISCONTINUED | OUTPATIENT
Start: 2023-08-04 | End: 2023-08-04

## 2023-08-04 RX ORDER — MORPHINE SULFATE 0.5 MG/ML
INJECTION, SOLUTION EPIDURAL; INTRATHECAL; INTRAVENOUS AS NEEDED
Status: DISCONTINUED | OUTPATIENT
Start: 2023-08-04 | End: 2023-08-04

## 2023-08-04 RX ORDER — ALBUTEROL SULFATE 2.5 MG/3ML
2.5 SOLUTION RESPIRATORY (INHALATION) ONCE AS NEEDED
Status: DISCONTINUED | OUTPATIENT
Start: 2023-08-04 | End: 2023-08-07 | Stop reason: HOSPADM

## 2023-08-04 RX ORDER — ACETAMINOPHEN 325 MG/1
650 TABLET ORAL EVERY 6 HOURS SCHEDULED
Status: DISCONTINUED | OUTPATIENT
Start: 2023-08-04 | End: 2023-08-07 | Stop reason: HOSPADM

## 2023-08-04 RX ORDER — SODIUM CHLORIDE, SODIUM LACTATE, POTASSIUM CHLORIDE, CALCIUM CHLORIDE 600; 310; 30; 20 MG/100ML; MG/100ML; MG/100ML; MG/100ML
125 INJECTION, SOLUTION INTRAVENOUS CONTINUOUS
Status: DISCONTINUED | OUTPATIENT
Start: 2023-08-04 | End: 2023-08-07 | Stop reason: HOSPADM

## 2023-08-04 RX ORDER — CARBOPROST TROMETHAMINE 250 UG/ML
INJECTION, SOLUTION INTRAMUSCULAR AS NEEDED
Status: DISCONTINUED | OUTPATIENT
Start: 2023-08-04 | End: 2023-08-04

## 2023-08-04 RX ORDER — ONDANSETRON 2 MG/ML
4 INJECTION INTRAMUSCULAR; INTRAVENOUS ONCE AS NEEDED
Status: DISCONTINUED | OUTPATIENT
Start: 2023-08-04 | End: 2023-08-07 | Stop reason: HOSPADM

## 2023-08-04 RX ORDER — NALBUPHINE HYDROCHLORIDE 10 MG/ML
5 INJECTION, SOLUTION INTRAMUSCULAR; INTRAVENOUS; SUBCUTANEOUS
Status: DISCONTINUED | OUTPATIENT
Start: 2023-08-04 | End: 2023-08-07 | Stop reason: HOSPADM

## 2023-08-04 RX ORDER — NALBUPHINE HYDROCHLORIDE 10 MG/ML
5 INJECTION, SOLUTION INTRAMUSCULAR; INTRAVENOUS; SUBCUTANEOUS
Status: ACTIVE | OUTPATIENT
Start: 2023-08-04 | End: 2023-08-05

## 2023-08-04 RX ORDER — DOCUSATE SODIUM 100 MG/1
100 CAPSULE, LIQUID FILLED ORAL 2 TIMES DAILY
Status: DISCONTINUED | OUTPATIENT
Start: 2023-08-04 | End: 2023-08-07 | Stop reason: HOSPADM

## 2023-08-04 RX ORDER — OXYTOCIN/RINGER'S LACTATE 30/500 ML
PLASTIC BAG, INJECTION (ML) INTRAVENOUS CONTINUOUS PRN
Status: DISCONTINUED | OUTPATIENT
Start: 2023-08-04 | End: 2023-08-04

## 2023-08-04 RX ORDER — DIPHENHYDRAMINE HCL 25 MG
25 TABLET ORAL EVERY 6 HOURS PRN
Status: DISCONTINUED | OUTPATIENT
Start: 2023-08-04 | End: 2023-08-07 | Stop reason: HOSPADM

## 2023-08-04 RX ORDER — FENTANYL CITRATE/PF 50 MCG/ML
25 SYRINGE (ML) INJECTION
Status: DISCONTINUED | OUTPATIENT
Start: 2023-08-04 | End: 2023-08-07 | Stop reason: HOSPADM

## 2023-08-04 RX ORDER — DEXTROSE AND SODIUM CHLORIDE 5; .9 G/100ML; G/100ML
100 INJECTION, SOLUTION INTRAVENOUS CONTINUOUS
Status: DISCONTINUED | OUTPATIENT
Start: 2023-08-04 | End: 2023-08-07 | Stop reason: HOSPADM

## 2023-08-04 RX ORDER — HYDROMORPHONE HCL/PF 1 MG/ML
0.5 SYRINGE (ML) INJECTION
Status: DISCONTINUED | OUTPATIENT
Start: 2023-08-04 | End: 2023-08-07 | Stop reason: HOSPADM

## 2023-08-04 RX ORDER — ENOXAPARIN SODIUM 100 MG/ML
40 INJECTION SUBCUTANEOUS DAILY
Status: DISCONTINUED | OUTPATIENT
Start: 2023-08-05 | End: 2023-08-07 | Stop reason: HOSPADM

## 2023-08-04 RX ORDER — KETOROLAC TROMETHAMINE 30 MG/ML
INJECTION, SOLUTION INTRAMUSCULAR; INTRAVENOUS AS NEEDED
Status: DISCONTINUED | OUTPATIENT
Start: 2023-08-04 | End: 2023-08-04

## 2023-08-04 RX ORDER — LIDOCAINE HCL/EPINEPHRINE/PF 2%-1:200K
VIAL (ML) INJECTION AS NEEDED
Status: DISCONTINUED | OUTPATIENT
Start: 2023-08-04 | End: 2023-08-04

## 2023-08-04 RX ORDER — PROMETHAZINE HYDROCHLORIDE 25 MG/ML
12.5 INJECTION, SOLUTION INTRAMUSCULAR; INTRAVENOUS ONCE AS NEEDED
Status: DISCONTINUED | OUTPATIENT
Start: 2023-08-04 | End: 2023-08-07 | Stop reason: HOSPADM

## 2023-08-04 RX ORDER — DIPHENHYDRAMINE HYDROCHLORIDE 50 MG/ML
25 INJECTION INTRAMUSCULAR; INTRAVENOUS ONCE
Status: COMPLETED | OUTPATIENT
Start: 2023-08-04 | End: 2023-08-04

## 2023-08-04 RX ORDER — CEFAZOLIN SODIUM 2 G/50ML
2000 SOLUTION INTRAVENOUS ONCE
Status: COMPLETED | OUTPATIENT
Start: 2023-08-04 | End: 2023-08-04

## 2023-08-04 RX ORDER — ACETAMINOPHEN 325 MG/1
650 TABLET ORAL EVERY 6 HOURS SCHEDULED
Status: DISCONTINUED | OUTPATIENT
Start: 2023-08-05 | End: 2023-08-04

## 2023-08-04 RX ORDER — OXYTOCIN/RINGER'S LACTATE 30/500 ML
62.5 PLASTIC BAG, INJECTION (ML) INTRAVENOUS ONCE
Status: COMPLETED | OUTPATIENT
Start: 2023-08-04 | End: 2023-08-05

## 2023-08-04 RX ORDER — NALOXONE HYDROCHLORIDE 0.4 MG/ML
0.1 INJECTION, SOLUTION INTRAMUSCULAR; INTRAVENOUS; SUBCUTANEOUS
Status: ACTIVE | OUTPATIENT
Start: 2023-08-04 | End: 2023-08-05

## 2023-08-04 RX ORDER — FENTANYL CITRATE 50 UG/ML
INJECTION, SOLUTION INTRAMUSCULAR; INTRAVENOUS AS NEEDED
Status: DISCONTINUED | OUTPATIENT
Start: 2023-08-04 | End: 2023-08-04

## 2023-08-04 RX ORDER — IBUPROFEN 600 MG/1
600 TABLET ORAL EVERY 6 HOURS
Status: DISCONTINUED | OUTPATIENT
Start: 2023-08-06 | End: 2023-08-07 | Stop reason: HOSPADM

## 2023-08-04 RX ORDER — NALOXONE HYDROCHLORIDE 0.4 MG/ML
0.1 INJECTION, SOLUTION INTRAMUSCULAR; INTRAVENOUS; SUBCUTANEOUS
Status: DISCONTINUED | OUTPATIENT
Start: 2023-08-04 | End: 2023-08-07 | Stop reason: HOSPADM

## 2023-08-04 RX ORDER — ONDANSETRON 2 MG/ML
INJECTION INTRAMUSCULAR; INTRAVENOUS AS NEEDED
Status: DISCONTINUED | OUTPATIENT
Start: 2023-08-04 | End: 2023-08-04

## 2023-08-04 RX ORDER — MEPERIDINE HYDROCHLORIDE 25 MG/ML
12.5 INJECTION INTRAMUSCULAR; INTRAVENOUS; SUBCUTANEOUS
Status: DISCONTINUED | OUTPATIENT
Start: 2023-08-04 | End: 2023-08-07 | Stop reason: HOSPADM

## 2023-08-04 RX ORDER — LANOLIN ALCOHOL/MO/W.PET/CERES
400 CREAM (GRAM) TOPICAL DAILY
Status: DISCONTINUED | OUTPATIENT
Start: 2023-08-04 | End: 2023-08-07 | Stop reason: HOSPADM

## 2023-08-04 RX ORDER — KETOROLAC TROMETHAMINE 30 MG/ML
30 INJECTION, SOLUTION INTRAMUSCULAR; INTRAVENOUS EVERY 6 HOURS SCHEDULED
Status: COMPLETED | OUTPATIENT
Start: 2023-08-05 | End: 2023-08-06

## 2023-08-04 RX ADMIN — LIDOCAINE HYDROCHLORIDE AND EPINEPHRINE 5 ML: 20; 5 INJECTION, SOLUTION EPIDURAL; INFILTRATION; INTRACAUDAL; PERINEURAL at 15:37

## 2023-08-04 RX ADMIN — PHENYLEPHRINE HYDROCHLORIDE 60 MCG/MIN: 10 INJECTION INTRAVENOUS at 15:43

## 2023-08-04 RX ADMIN — Medication 1 TABLET: at 22:02

## 2023-08-04 RX ADMIN — ONDANSETRON 4 MG: 2 INJECTION INTRAMUSCULAR; INTRAVENOUS at 02:40

## 2023-08-04 RX ADMIN — ONDANSETRON 4 MG: 2 INJECTION INTRAMUSCULAR; INTRAVENOUS at 16:16

## 2023-08-04 RX ADMIN — VALACYCLOVIR 500 MG: 500 TABLET, FILM COATED ORAL at 11:03

## 2023-08-04 RX ADMIN — DEXTROSE AND SODIUM CHLORIDE 100 ML/HR: 5; .9 INJECTION, SOLUTION INTRAVENOUS at 14:10

## 2023-08-04 RX ADMIN — Medication 62.5 MILLI-UNITS/MIN: at 18:00

## 2023-08-04 RX ADMIN — ROPIVACAINE HYDROCHLORIDE: 2 INJECTION, SOLUTION EPIDURAL; INFILTRATION at 11:20

## 2023-08-04 RX ADMIN — FENTANYL CITRATE 100 MCG: 50 INJECTION, SOLUTION INTRAMUSCULAR; INTRAVENOUS at 15:37

## 2023-08-04 RX ADMIN — CEFAZOLIN SODIUM 2000 MG: 2 SOLUTION INTRAVENOUS at 15:36

## 2023-08-04 RX ADMIN — SODIUM CHLORIDE, SODIUM LACTATE, POTASSIUM CHLORIDE, AND CALCIUM CHLORIDE: .6; .31; .03; .02 INJECTION, SOLUTION INTRAVENOUS at 16:50

## 2023-08-04 RX ADMIN — SODIUM CHLORIDE, SODIUM LACTATE, POTASSIUM CHLORIDE, AND CALCIUM CHLORIDE 125 ML/HR: .6; .31; .03; .02 INJECTION, SOLUTION INTRAVENOUS at 11:20

## 2023-08-04 RX ADMIN — ROPIVACAINE HYDROCHLORIDE: 2 INJECTION, SOLUTION EPIDURAL; INFILTRATION at 04:22

## 2023-08-04 RX ADMIN — TRANEXAMIC ACID 1000 MG: 10 INJECTION, SOLUTION INTRAVENOUS at 16:16

## 2023-08-04 RX ADMIN — LEVOTHYROXINE SODIUM 150 MCG: 75 TABLET ORAL at 07:05

## 2023-08-04 RX ADMIN — KETOROLAC TROMETHAMINE 30 MG: 30 INJECTION, SOLUTION INTRAMUSCULAR; INTRAVENOUS at 16:49

## 2023-08-04 RX ADMIN — MORPHINE SULFATE 3 MG: 0.5 INJECTION, SOLUTION EPIDURAL; INTRATHECAL; INTRAVENOUS at 16:53

## 2023-08-04 RX ADMIN — LIDOCAINE HYDROCHLORIDE AND EPINEPHRINE 5 ML: 20; 5 INJECTION, SOLUTION EPIDURAL; INFILTRATION; INTRACAUDAL; PERINEURAL at 15:36

## 2023-08-04 RX ADMIN — LIDOCAINE HYDROCHLORIDE AND EPINEPHRINE 5 ML: 20; 5 INJECTION, SOLUTION EPIDURAL; INFILTRATION; INTRACAUDAL; PERINEURAL at 15:35

## 2023-08-04 RX ADMIN — SODIUM CHLORIDE, SODIUM LACTATE, POTASSIUM CHLORIDE, AND CALCIUM CHLORIDE 125 ML/HR: .6; .31; .03; .02 INJECTION, SOLUTION INTRAVENOUS at 04:22

## 2023-08-04 RX ADMIN — AZITHROMYCIN MONOHYDRATE 500 MG: 500 INJECTION, POWDER, LYOPHILIZED, FOR SOLUTION INTRAVENOUS at 15:45

## 2023-08-04 RX ADMIN — Medication 250 MILLI-UNITS/MIN: at 16:04

## 2023-08-04 RX ADMIN — SODIUM CHLORIDE 0.5 UNITS/HR: 9 INJECTION, SOLUTION INTRAVENOUS at 14:10

## 2023-08-04 RX ADMIN — ACETAMINOPHEN 650 MG: 325 TABLET, FILM COATED ORAL at 21:56

## 2023-08-04 RX ADMIN — FOLIC ACID TAB 400 MCG 400 MCG: 400 TAB at 23:06

## 2023-08-04 RX ADMIN — DIPHENHYDRAMINE HYDROCHLORIDE 25 MG: 50 INJECTION, SOLUTION INTRAMUSCULAR; INTRAVENOUS at 04:42

## 2023-08-04 RX ADMIN — LIDOCAINE HYDROCHLORIDE AND EPINEPHRINE 5 ML: 20; 5 INJECTION, SOLUTION EPIDURAL; INFILTRATION; INTRACAUDAL; PERINEURAL at 15:38

## 2023-08-04 RX ADMIN — CARBOPROST TROMETHAMINE 250 MCG: 250 INJECTION INTRAMUSCULAR at 16:15

## 2023-08-04 RX ADMIN — DIPHENHYDRAMINE HYDROCHLORIDE 25 MG: 25 TABLET ORAL at 22:02

## 2023-08-04 RX ADMIN — BUPIVACAINE HYDROCHLORIDE 5 ML: 2.5 INJECTION, SOLUTION EPIDURAL; INFILTRATION; INTRACAUDAL; PERINEURAL at 07:35

## 2023-08-04 RX ADMIN — Medication 250 MILLI-UNITS/MIN: at 16:28

## 2023-08-04 RX ADMIN — BUPIVACAINE HYDROCHLORIDE 5 ML: 2.5 INJECTION, SOLUTION EPIDURAL; INFILTRATION; INTRACAUDAL; PERINEURAL at 07:28

## 2023-08-04 NOTE — DISCHARGE SUMMARY
Discharge Summary - Halina Colon 27 y.o. 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 associated with type 1 diabetes mellitus (HCC)   • 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 without macular edema associated with type 1 diabetes mellitus (HCC)   • Herpes simplex virus (HSV) infection   • Status post primary low transverse  section   • Pre-existing type 1 diabetes mellitus with retinopathy during pregnancy in third trimester ([address])   • BMI 36.0-36.9,adult   • Hypothyroidism during pregnancy in third trimester   • Marginal insertion of umbilical cord affecting management of mother in third trimester   • Maternal obesity, antepartum, third trimester   • Anxiety during pregnancy in third trimester, antepartum   • Gestational hypertension, third trimester         OBGYN Practice: [facility], switching to [facility] Course:   Halina Colon is a 27 y.o.  at 37w2d admitted for IOL for gHTN. Of note she is also a type I diabetic with extremely well-controlled sugars during pregnancy. She has an insulin pump. on admission, she was closed/thick/high. At this time we discussed her continuing on her insulin pump until the active stage of labor due to well-controlled blood sugars. We began her induction with oral Cytotec, and she opted for an early epidural due to intolerance of exams. We attempted to place a Andres balloon but were unsuccessful, so we gave a vaginal Cytotec. 2 hours later we again attempted to place a Andres balloon and were successful. The Andres balloon came out and the patient was 4/30/-3.   Pitocin titration was started. Patient remained unchanged for 12 hours overnight. We ruptured her for clear fluid around 8 AM. Patient called providers to bedside to discuss continuing her induction versus having a . Socrates Portillo was counseled that she did not meet criteria for arrest of dilation, and she opted to have an elective  at maternal request.  Her insulin pump was removed and she was placed on an insulin drip right before moving to the operating room, and continued on the insulin drip through PACU.            Delivery Findings:  Socrates Portillo delivered a viable male  on 2023  4:04 PM  via , Low Transverse  . The delivery was complicated by uterine tone, and hemabate and txa were given, resulting in improved tone. Baby's Weight: 3380 g (7 lb 7.2 oz) ; 119.22     Apgar scores: 7  and 9  at 1 and 5 minutes, respectively  Anesthesia: Epidural ,   QBL:      401     was transferred to nicu for blood sugar checks. Patient tolerated the procedure well and was transferred to recovery in stable condition. Her post-partum course was uncomplicated. Her post-partum pain was well controlled with oral analgesics. On day of discharge she was ambulating, voiding spontaneously, tolerating oral intake and hemodynamically stable. Mom's blood type is A positive and  Rhogam was not given. She was discharged home on postpartum day #3 without complications. Patient was instructed to follow up with her OB as an outpatient and was given appropriate warnings to call doctor or provider if she develops signs of infection or uncontrolled pain. Disposition: Home    Planned Readmission: No    Discharge Medications:   Please see AVS    Discharge instructions :   -Do not place anything (no partner, tampons or douche) in your vagina for 6 weeks.   -You may walk for exercise for the first 6 weeks then gradually return to your usual activities.   -Please do not drive for 1 week if you have no stitches and for 2 weeks if you have stitches or underwent a  delivery.    -You may take baths or shower per your preference.   -Please look at your bust (breasts) in the mirror daily and call your doctor for redness or tenderness or increased warmth. - If you have had a  section please look at your incision daily as well and call provider for increasing redness or steady drainage from the incision.   -Please call your doctor's office if temperature > 100.4*F or 38* C, worsening pain or a foul discharge.     Follow Up:  - Follow up in 1 week for postpartum visit blood pressure check    Lyndon Woody DO PGY-1  23  6:48 AM

## 2023-08-04 NOTE — OB LABOR/OXYTOCIN SAFETY PROGRESS
Oxytocin Safety Progress Check Note - [facility] Blust 27 y.o. female MRN: [id number]    Unit/Bed#: -01 Encounter: [id number]    Dose (tenzin-units/min) Oxytocin: 8 tenzin-units/min  Contraction Frequency (minutes): 2  Contraction Quality: Mild  Tachysystole: No   Cervical Dilation: 3-4        Cervical Effacement: 50  Fetal Station: Floating  Baseline Rate: 135 bpm  Fetal Heart Rate: 140 BPM  FHR Category: Category I             Vital Signs:   Vitals:    08/03/23 2354   BP: 132/87   Pulse: 100   Resp:    Temp:    SpO2:        Notes/comments: Patient reassessed and is comfortable. Cervical exam as above. FHT category I. Will reassess in 2 hours or sooner if clinically indicated.          Sean Gonzales MD 8/4/2023 12:29 AM

## 2023-08-04 NOTE — OB LABOR/OXYTOCIN SAFETY PROGRESS
Oxytocin Safety Progress Check Note - Pino Farrell 27 y.o. female MRN: [id number]    Unit/Bed#: -01 Encounter: [id number]    Dose (tenzin-units/min) Oxytocin: 8 tenzin-units/min (As per Dr. Sukh Wu)  Contraction Frequency (minutes): 2-2.5  Contraction Quality: Moderate  Tachysystole: No   Cervical Dilation: 3-4        Cervical Effacement: 70  Fetal Station: -3  Baseline Rate: 135 bpm  Fetal Heart Rate: 135 BPM  FHR Category: Category I             Vital Signs:   Vitals:    08/04/23 1102   BP: 123/79   Pulse: 94   Resp:    Temp:    SpO2:        Notes/comments: SVE deferred. Category I tracing. Continue pitocin titration.          Rafita Mattson MD 8/4/2023 11:20 AM

## 2023-08-04 NOTE — OB LABOR/OXYTOCIN SAFETY PROGRESS
Labor Progress Note - Fang Farrell 27 y.o. female MRN: [id number]    Unit/Bed#: -01 Encounter: [id number]    Dose (tenzin-units/min) Oxytocin: 0 tenzin-units/min  Contraction Frequency (minutes): 2-3.5  Contraction Quality: Moderate  Tachysystole: No   Cervical Dilation: 3-4        Cervical Effacement: 70  Fetal Station: -3  Baseline Rate: 135 bpm  Fetal Heart Rate: 145 BPM  FHR Category: Category I               Vital Signs:   Vitals:    23 1246   BP: 121/80   Pulse: (!) 109   Resp:    Temp:    SpO2:        Notes/comments:   SAFETY HUDDLE:  TRIGGER: Patient desires 1LTCS for maternal request    PARTICIPANTS: Evon Miller DO, DO, Michael Cameron RN, Aron Camarillo MD    REVIEW OF CURRENT PLAN OF CARE AND MANAGEMENT:   Fabio James is in the latent phase of labor. The cervical exam has remained unchanged and membranes have been ruptured for 4 hrs and Oxytocin has been running for approximately 12 hours. She has been unchanged since FB was expelled. She has a very small bony pelvis and is concerned that she will be unable to progress any further as her mom & sisters also had c/s. Although she knows she has not officially met criteria for failed IOL or an arrest disorder, she is requesting  delivery for her mental status and personal preference, which is a valid indication.      - Maternal status is reassuring.   - Fetal status shows a Category 1 FHR tracing.  - I have discussed the risks and benefits of  delivery with Fang, including risk of infection, bleeding, injury to surrounding organs. She was counseled that there are medical and surgical methods to manage excessive postpartum bleeding. She was counseled that in the event of excessive blood loss, she may require blood transfusion or even need postpartum hysterectomy The patient is OK with receiving a blood transfusion if necessary. The patient had an opportunity to ask questions and consented.     Azithro and ancef ordered  Pitocin discontinued John Galindo DO 8/4/2023 1:05 PM

## 2023-08-04 NOTE — OB LABOR/OXYTOCIN SAFETY PROGRESS
Oxytocin Safety Progress Check Note - Carlos Alberto Farrell 27 y.o. female MRN: [id number]    Unit/Bed#: -01 Encounter: [id number]    Dose (tenzin-units/min) Oxytocin: 8 tenzin-units/min  Contraction Frequency (minutes): 1.5-3  Contraction Quality: Moderate  Tachysystole: No   Cervical Dilation: 3-4        Cervical Effacement: 70  Fetal Station: -3  Baseline Rate: 125 bpm  Fetal Heart Rate: 123 BPM  FHR Category: Category I               Vital Signs:   Vitals:    23 0832   BP: 100/62   Pulse: 79   Resp:    Temp:    SpO2:        Notes/comments: SVE as above. Carlos Alberto Tilley is tired but open to continuing laboring at this time. She is considering opting for a  depending on "speed of labor progression." AROMed at [address] [place] [address]. We will continue pitocin titration. Cat 1 FHT.          Coeur D'Alene Stallion, DO 2023 8:47 AM

## 2023-08-04 NOTE — OB LABOR/OXYTOCIN SAFETY PROGRESS
Oxytocin Safety Progress Check Note - Kimberley Pearl Blust 27 y.o. female MRN: [id number]    Unit/Bed#: - Encounter: [id number]    Dose (tenzin-units/min) Oxytocin: 0 tenzin-units/min  Contraction Frequency (minutes): 2-3.5  Contraction Quality: Moderate  Tachysystole: No   Cervical Dilation: 3-4        Cervical Effacement: 70  Fetal Station: -3  Baseline Rate: 135 bpm  Fetal Heart Rate: 145 BPM  FHR Category: Category I               Vital Signs:   Vitals:    23 1246   BP: 121/80   Pulse: (!) 109   Resp:    Temp:    SpO2:        Notes/comments: Cat I tracing. SVE unchanged. Will proceed with  for maternal request. Will keep insulin pump on until prepping in the OR, at which point we will switch to an insulin drip.          Bean Lizarraga DO 2023 1:02 PM

## 2023-08-04 NOTE — OB LABOR/OXYTOCIN SAFETY PROGRESS
Oxytocin Safety Progress Check Note - Lori Farrell 27 y.o. female MRN: [id number]    Unit/Bed#: -01 Encounter: [id number]    Dose (tenzin-units/min) Oxytocin: 8 tenzin-units/min  Contraction Frequency (minutes): 1.5-3  Contraction Quality: Moderate  Tachysystole: No   Cervical Dilation: 3-4        Cervical Effacement: 70  Fetal Station: -3  Baseline Rate: 125 bpm  Fetal Heart Rate: 123 BPM  FHR Category: Category I               Vital Signs:   Vitals:    08/04/23 0832   BP: 100/62   Pulse: 79   Resp:    Temp:    SpO2:        Notes/comments:     SVE as above - AROM for clear fluid with gentle touch of bag. Discussed plan for the day. She is requesting 1LTCS by noon if she does not have the baby by then. We discussed this is an unrealistic goal given primiparity, unripe cervix, and that she was not AROM'd until this AM. She is amenable to continuing with IOL at this point, but may request 1LTCS later today. She is specifically worried about cephalopelvic disproportion. We discussed that CPD is difficult to predict, and the only true way to know is to try to achieve vaginal delivery.  Examined w/ Dr. Isatu Valdivia bedside        Layo Shoemaker DO 8/4/2023 8:46 AM

## 2023-08-04 NOTE — PLAN OF CARE
Problem: BIRTH - VAGINAL/ SECTION  Goal: Fetal and maternal status remain reassuring during the birth process  Description: INTERVENTIONS:  - Monitor vital signs  - Monitor fetal heart rate  - Monitor uterine activity  - Monitor labor progression (vaginal delivery)  - DVT prophylaxis  - Antibiotic prophylaxis  Outcome: Progressing  Goal: Emotionally satisfying birthing experience for mother/fetus  Description: Interventions:  - Assess, plan, implement and evaluate the nursing care given to the patient in labor  - Advocate the philosophy that each childbirth experience is a unique experience and support the family's chosen level of involvement and control during the labor process   - Actively participate in both the patient's and family's teaching of the birth process  - Consider cultural, Spiritism and age-specific factors and plan care for the patient in labor  Outcome: Progressing     Problem: Knowledge Deficit  Goal: Verbalizes understanding of labor plan  Description: Assess patient/family/caregiver's baseline knowledge level and ability to understand information. Provide education via patient/family/caregiver's preferred learning method at appropriate level of understanding. 1. Provide teaching at level of understanding. 2. Provide teaching via preferred learning method(s). Outcome: Progressing  Goal: Patient/family/caregiver demonstrates understanding of disease process, treatment plan, medications, and discharge instructions  Description: Complete learning assessment and assess knowledge base. Interventions:  - Provide teaching at level of understanding  - Provide teaching via preferred learning methods  Outcome: Progressing     Problem: Labor & Delivery  Goal: Manages discomfort  Description: Assess and monitor for signs and symptoms of discomfort. Assess patient's pain level regularly and per hospital policy. Administer medications as ordered.  Support use of nonpharmacological methods to help control pain such as distraction, imagery, relaxation, and application of heat and cold. Collaborate with interdisciplinary team and patient to determine appropriate pain management plan. 1. Include patient in decisions related to comfort. 2. Offer non-pharmacological pain management interventions. 3. Report ineffective pain management to physician. Outcome: Progressing  Goal: Patient vital signs are stable  Description: 1. Assess vital signs - vaginal delivery.   Outcome: Progressing     Problem: INFECTION - ADULT  Goal: Absence or prevention of progression during hospitalization  Description: INTERVENTIONS:  - Assess and monitor for signs and symptoms of infection  - Monitor lab/diagnostic results  - Monitor all insertion sites, i.e. indwelling lines, tubes, and drains    - [place] appropriate cooling/warming therapies per order  - Administer medications as ordered  - Instruct and encourage patient and family to use good hand hygiene technique  - Identify and instruct in appropriate isolation precautions for identified infection/condition  Outcome: Progressing  Goal: Absence of fever/infection during neutropenic period  Description: INTERVENTIONS:  - Monitor WBC    Outcome: Progressing     Problem: PAIN - ADULT  Goal: Verbalizes/displays adequate comfort level or baseline comfort level  Description: Interventions:  - Encourage patient to monitor pain and request assistance  - Assess pain using appropriate pain scale  - Administer analgesics based on type and severity of pain and evaluate response  - Implement non-pharmacological measures as appropriate and evaluate response  - Consider cultural and social influences on pain and pain management  - Notify physician/advanced practitioner if interventions unsuccessful or patient reports new pain  Outcome: Progressing     Problem: SAFETY ADULT  Goal: Patient will remain free of falls  Description: INTERVENTIONS:  - Educate patient/family on patient safety including physical limitations  - Instruct patient to call for assistance with activity   - Consult OT/PT to assist with strengthening/mobility   - Keep Call bell within reach  - Keep bed low and locked with side rails adjusted as appropriate  - Keep care items and personal belongings within reach  - Initiate and maintain comfort rounds  - Make Fall Risk Sign visible to staff    - Apply yellow socks and bracelet for high fall risk patients  - Consider moving patient to room near nurses station  Outcome: Progressing  Goal: Maintain or return to baseline ADL function  Description: INTERVENTIONS:  -  Assess patient's ability to carry out ADLs; assess patient's baseline for ADL function and identify physical deficits which impact ability to perform ADLs (bathing, care of mouth/teeth, toileting, grooming, dressing, etc.)  - Assess/evaluate cause of self-care deficits   - Assess range of motion  - Assess patient's mobility; develop plan if impaired  - Assess patient's need for assistive devices and provide as appropriate  - Encourage maximum independence but intervene and supervise when necessary  - Involve family in performance of ADLs  - Assess for home care needs following discharge   - Consider OT consult to assist with ADL evaluation and planning for discharge  - Provide patient education as appropriate  Outcome: Progressing  Goal: Maintains/Returns to pre admission functional level  Description: INTERVENTIONS:  - Perform BMAT or MOVE assessment daily.   - Set and communicate daily mobility goal to care team and patient/family/caregiver.    - Collaborate with rehabilitation services on mobility goals if consulted    - Out of bed for toileting  - Record patient progress and toleration of activity level   Outcome: Progressing     Problem: DISCHARGE PLANNING  Goal: Discharge to home or other facility with appropriate resources  Description: INTERVENTIONS:  - Identify barriers to discharge w/patient and caregiver  - Arrange for needed discharge resources and transportation as appropriate  - Identify discharge learning needs   - Refer to Case Management Department for coordinating discharge planning if the patient needs post-hospital services based on physician/advanced practitioner order or complex needs related to functional status, cognitive ability, or social support system  Outcome: Progressing

## 2023-08-04 NOTE — PLAN OF CARE
Problem: BIRTH - VAGINAL/ SECTION  Goal: Fetal and maternal status remain reassuring during the birth process  Description: INTERVENTIONS:  - Monitor vital signs  - Monitor fetal heart rate  - Monitor uterine activity  - Monitor labor progression (vaginal delivery)  - DVT prophylaxis  - Antibiotic prophylaxis  Outcome: Progressing  Goal: Emotionally satisfying birthing experience for mother/fetus  Description: Interventions:  - Assess, plan, implement and evaluate the nursing care given to the patient in labor  - Advocate the philosophy that each childbirth experience is a unique experience and support the family's chosen level of involvement and control during the labor process   - Actively participate in both the patient's and family's teaching of the birth process  - Consider cultural, Bahai and age-specific factors and plan care for the patient in labor  Outcome: Progressing     Problem: Knowledge Deficit  Goal: Verbalizes understanding of labor plan  Description: Assess patient/family/caregiver's baseline knowledge level and ability to understand information. Provide education via patient/family/caregiver's preferred learning method at appropriate level of understanding. 1. Provide teaching at level of understanding. 2. Provide teaching via preferred learning method(s). Outcome: Progressing  Goal: Patient/family/caregiver demonstrates understanding of disease process, treatment plan, medications, and discharge instructions  Description: Complete learning assessment and assess knowledge base. Interventions:  - Provide teaching at level of understanding  - Provide teaching via preferred learning methods  Outcome: Progressing     Problem: Labor & Delivery  Goal: Manages discomfort  Description: Assess and monitor for signs and symptoms of discomfort. Assess patient's pain level regularly and per hospital policy. Administer medications as ordered.  Support use of nonpharmacological methods to help control pain such as distraction, imagery, relaxation, and application of heat and cold. Collaborate with interdisciplinary team and patient to determine appropriate pain management plan. 1. Include patient in decisions related to comfort. 2. Offer non-pharmacological pain management interventions. 3. Report ineffective pain management to physician. Outcome: Progressing  Goal: Patient vital signs are stable  Description: 1. Assess vital signs - vaginal delivery.   Outcome: Progressing     Problem: PAIN - ADULT  Goal: Verbalizes/displays adequate comfort level or baseline comfort level  Description: Interventions:  - Encourage patient to monitor pain and request assistance  - Assess pain using appropriate pain scale  - Administer analgesics based on type and severity of pain and evaluate response  - Implement non-pharmacological measures as appropriate and evaluate response  - Consider cultural and social influences on pain and pain management  - Notify physician/advanced practitioner if interventions unsuccessful or patient reports new pain  Outcome: Progressing     Problem: INFECTION - ADULT  Goal: Absence or prevention of progression during hospitalization  Description: INTERVENTIONS:  - Assess and monitor for signs and symptoms of infection  - Monitor lab/diagnostic results  - Monitor all insertion sites, i.e. indwelling lines, tubes, and drains  - Monitor endotracheal if appropriate and nasal secretions for changes in amount and color  - [place] appropriate cooling/warming therapies per order  - Administer medications as ordered  - Instruct and encourage patient and family to use good hand hygiene technique  - Identify and instruct in appropriate isolation precautions for identified infection/condition  Outcome: Progressing  Goal: Absence of fever/infection during neutropenic period  Description: INTERVENTIONS:  - Monitor WBC    Outcome: Progressing     Problem: SAFETY ADULT  Goal: Patient will remain free of falls  Description: INTERVENTIONS:  - Educate patient/family on patient safety including physical limitations  - Instruct patient to call for assistance with activity   - Consult OT/PT to assist with strengthening/mobility   - Keep Call bell within reach  - Keep bed low and locked with side rails adjusted as appropriate  - Keep care items and personal belongings within reach  - Initiate and maintain comfort rounds  - Make Fall Risk Sign visible to staff  - Offer Toileting every 2 Hours, in advance of need  - Initiate/Maintain bed alarm if needed  - Obtain necessary fall risk management equipment: no-slip socks  - Apply yellow socks and bracelet for high fall risk patients  - Consider moving patient to room near nurses station  Outcome: Progressing  Goal: Maintain or return to baseline ADL function  Description: INTERVENTIONS:  -  Assess patient's ability to carry out ADLs; assess patient's baseline for ADL function and identify physical deficits which impact ability to perform ADLs (bathing, care of mouth/teeth, toileting, grooming, dressing, etc.)  - Assess/evaluate cause of self-care deficits   - Assess range of motion  - Assess patient's mobility; develop plan if impaired  - Assess patient's need for assistive devices and provide as appropriate  - Encourage maximum independence but intervene and supervise when necessary  - Involve family in performance of ADLs  - Assess for home care needs following discharge   - Consider OT consult to assist with ADL evaluation and planning for discharge  - Provide patient education as appropriate  Outcome: Progressing  Goal: Maintains/Returns to pre admission functional level  Description: INTERVENTIONS:  - Perform BMAT or MOVE assessment daily.   - Set and communicate daily mobility goal to care team and patient/family/caregiver. - Collaborate with rehabilitation services on mobility goals if consulted  - Perform Range of Motion PRN times a day.   - Reposition patient every PRN hours.   - Dangle patient PRN times a day  - Stand patient PRN times a day  - Ambulate patient PRN times a day  - Out of bed to chair PRN times a day   - Out of bed for meals PRN times a day  - Out of bed for toileting  - Record patient progress and toleration of activity level   Outcome: Progressing     Problem: DISCHARGE PLANNING  Goal: Discharge to home or other facility with appropriate resources  Description: INTERVENTIONS:  - Identify barriers to discharge w/patient and caregiver  - Arrange for needed discharge resources and transportation as appropriate  - Identify discharge learning needs (meds, wound care, etc.)  - Arrange for interpretive services to assist at discharge as needed  - Refer to Case Management Department for coordinating discharge planning if the patient needs post-hospital services based on physician/advanced practitioner order or complex needs related to functional status, cognitive ability, or social support system  Outcome: Progressing

## 2023-08-04 NOTE — OB LABOR/OXYTOCIN SAFETY PROGRESS
Oxytocin Safety Progress Check Note - [facility] [place] 27 y.o. female MRN: [id number]    Unit/Bed#: -01 Encounter: [id number]    Dose (tenzin-units/min) Oxytocin: 6 tenzin-units/min  Contraction Frequency (minutes): 1-5  Contraction Quality: Mild  Tachysystole: No   Cervical Dilation: 4        Cervical Effacement: 50  Fetal Station: Floating  Baseline Rate: 120 bpm  Fetal Heart Rate: 120 BPM  FHR Category: Category I               Vital Signs:   Vitals:    08/04/23 0238   BP: 105/59   Pulse: 96   Resp:    Temp:    SpO2:        Notes/comments: Patient reassessed. Cervical exam as above. FHT category I. Will reassess in 2 hours or sooner if clinically indicated.          Nathan Ortiz MD 8/4/2023 4:07 AM

## 2023-08-04 NOTE — PROGRESS NOTES
Spoke with patient via phone at her request to discuss . She has been checked and is slightly more dilated than after her balloon fell out. She is worried she is going to need a  due to her pelvis shape, as her sister did. She has not slept much in several nights and is tired and generally uncomfortable. She has an epidural in place and pitocin has been on since just before 9pm.     We discussed that I do be lieve she is higher risk to end up requiring a  based on her exam, but that I do not believe we have given her induction a fair chance/enough time as she has not been on pitocin for even 12 hours. She inquired if because of the diabetes she had higher risk of healing problems with a  and we discussed this. We discussed importance of good glycemic control. I offered her three option at this time:    1) Pro ceed with  now  2) Benadryl for rest/heating pad for comfort to allow her to try and get some sleep and allow continued induction of labor  3) continue induction of labor as we are now    We discussed patient autonomy and that she can request a  if she desires and that I would support her decision making with adequate counseling. She opted to proceed with a dose of Benadryl and to try and get more comfo rtable/get some sleep. She plans to allow induction to continue and re-consider  mid-day if things have not progressed more. All questions answered.

## 2023-08-04 NOTE — OP NOTE
OPERATIVE REPORT  PATIENT NAME: Jason Villalta    :  1990  MRN: [id number]  Pt Location: [facility] OR ROOM 02    SURGERY DATE: 2023    Surgeon(s) and Role:     * Colin iBshop MD - Primary     * Katherine Corea,  - Assisting     * Danny Anderson,  - Assisting    Preop Diagnosis:  Patient-requested procedure [Z41.9]    Post-Op Diagnosis Codes:     * Patient-requested procedure [Z41.9]    Procedure(s) (LRB):   SECTION () (N/A)    Specimen(s):  ID Type Source Tests Collected by Time Destination   A :  Cord Blood Cord BLOOD GAS, VENOUS, CORD, BLOOD GAS, ARTERIAL, CORD Colin Bishop MD 2023 1608    B :  Tissue (Placenta on Hold) OB Only Placenta PLACENTA IN STORAGE Colin Bishop MD 2023 1608        Surgical QBL:  Surgical QBL (mL): 401 mL      Drains:  Urethral Catheter (Active)   Reasons to continue Urinary Catheter  Post-operative urological requirements 23 1539   Goal for Removal Remove POD#1 23 1539   Site Assessment Skin intact; Clean;Pink;Patent 23 1539   Andres Care Done 23 0715   Collection Container Standard drainage bag 23 1539   Securement Method Securing device (Describe) 23 1539   Output (mL) 600 mL 23 1518   Number of days: 1       Anesthesia Type:   Epidural    Operative Indications:  Patient-requested procedure [Z41.9]       Esteban Group Classification System:  No Multiple pregnancy, No Transverse or oblique lie, No Breech lie, Gestational age is > or =37 weeks, Nulliparous, Labor induced +  is ESTEBAN GROUP 2a    Operative Findings:  Normal uterus, bilateral fallopian tubes, bilateral ovaries. No adhesions    Complications:     Procedure and Technique:    Brief History  Jason Villalta is a 27 y.o. Zeinab Candle at 37w2d admitted for IOL for gHTN. Of note she is also a type I diabetic with extremely well-controlled sugars during pregnancy. She has an insulin pump. on admission, she was closed/thick/high.   At this time we discussed her continuing on her insulin pump until the active stage of labor due to well-controlled blood sugars. We began her induction with oral Cytotec, and she opted for an early epidural due to intolerance of exams. We attempted to place a Andres balloon but were unsuccessful, so we gave a vaginal Cytotec. 2 hours later we again attempted to place a Andres balloon and were successful. The Andres balloon came out and the patient was 4/-3. Pitocin titration was started. Patient remained unchanged for 12 hours overnight. We ruptured her for clear fluid around 8 AM. Patient called providers to bedside to discuss continuing her induction versus having a . Fabian Gonzalez was counseled that she did not meet criteria for arrest of dilation, and she opted to have an elective  at maternal request.  Her insulin pump was removed and she was placed on an insulin drip right before moving to the operating room, and continued on the insulin drip through PACU. Operative Indications:  Primary low transverse  section for maternal request    Attending Surgeon: Dr. Libertad Mejia  Resident Surgeon: Dr. Malathi Dominguez PGY-4, Dr. Da Alarcon PGY-1    Operative Findings:  1. Viable male  on 2023 at [id number] with APGARs of 7 and 9 at 1 and 5 minutes. Fetus weighted 7lb 7oz. 2. Clear amniotic fluid  3. Normal intact placenta with centrally inserted 3VC. 4. Normal uterus, bilateral tubes and ovaries  5.  Adhesions absent    QBL: 107    Umbilical Cord Venous Blood Gas:  Results from last 7 days   Lab Units 23  1604   PH COV  7.267   PCO2 COV mm HG 54.2*   HCO3 COV mmol/L 24.2   BASE EXC COV mmol/L -3.7*   O2 CT CD VB mL/dL 8.3   O2 HGB, VENOUS CORD % 14.2     Umbilical Cord Arterial Blood Gas:  Results from last 7 days   Lab Units 23  1604   PH COA  7.189*   PCO2 COA  68.9*   PO2 COA mm HG 19.8   HCO3 COA mmol/L 25.7   BASE EXC COA mmol/L -4.5*   O2 CONTENT CORD ART ml/dl 9.1   O2 HGB, ARTERIAL CORD % 38.0       Operative Technique  The patient was taken to the operating room. Epidural anesthesia was adequately redosed and ancef and azithromycin were given for preoperative prophylaxis. The patient was then placed in the dorsal supine position with a left tilt of the hips. The patient was then prepped with chlorahexidine for vaginal prep and chloraprep for abdominal prep and draped in the usual sterile fashion for a Pfannenstiel skin incision. A time out was performed to confirm correct patient and correct procedure. A Pfannenstiel incision was made and carried down through the underlying subcutaneous tissue to the fascia using a scalpel, followed by the Bovie electrocautery for hemostasis. Rectus fascia was then incised. We then proceeded in Jeff-Martines fashion. All anatomic layers were well-demarcated. The rectus muscles were  and the peritoneum was identified, entered, and extended longitudinally with blunt dissection. The bladder blade was inserted and a transverse incision was made in the lower uterine segment using a new surgical blade. The uterine incision was extended cephalad and caudal using blunt dissection. The amniotic sac was entered. The surgeon's hand was placed into the uterine cavity. The fetal head was identified and elevated to the uterine incision with the assistance of fundal pressure. A vacuum was placed on the  head to facilitate delivery. The head was delivered with assistance of vacuum. With gentle traction, the shoulder was delivered, followed by the rest of the fetal body. There was a nuchal cord noted, wrapped loosely around the neck, and it was reduced. On delivery the cord was doubly clamped and cut after delayed cord clamping. The infant was then passed off the table to the awaiting  staff. The  was noted to cry spontaneously and moved all extremities.  Venous and arterial blood gas, cord blood, and portion of cord was obtained for analysis and routine blood testing. The placenta delivery was then sent to storage. Placenta was noted to be intact with a centrally inserted three-vessel cord. Oxytocin was administered by IV infusion to enhance uterine contraction. The uterus was exteriorized and cleared of all clots and remaining products of conception. The uterine incision was re approximated using an 0 Vicryl in a running unlocked fashion. The uterine incision was examined and noted to be hemostatic. Uterus was noted to still be boggy, uterine massage was initiated and Hemabate and TXA were given. Adequate uterine tone was achieved. The uterus was replaced into the abdomen and the pericolic gutters were cleared of all clots. The uterine incision was once again reexamined and noted to be hemostatic. The fascia was re approximated using an 0 Vicryl in a running nonlocked fashion. The subcutaneous tissue was irrigated and cleared of all clots and debris. Good hemostasis was noted with Bovie electrocautery. The subcutaneous tissue was reapproximated with a running 2-0 plain gut. Good hemostasis was noted. The skin was reapproximated with a 4-0 monocryl. Patient tolerated the procedure well. All needle, sponge, and instrument counts were noted to be correct x 2 at the end of the procedure. Patient was transferred to the recovery room in stable condition. Dr. Magaly Robles was present and participated in the entire surgery. Patient Disposition:  PACU         SIGNATURE: Lucien Berg DO  DATE: August 4, 2023  TIME: 6:16 PM      I was present and scrubbed for the key portions of the procedure and I agree with the documentation as described.     Jazlyn Cabrales MD

## 2023-08-04 NOTE — INTERIM OP NOTE
SECTION ()  Postoperative Note  PATIENT NAME: Carlos Candelaria  : 1990  MRN: [id number]  [facility]    Surgery Date: 2023    Preop Diagnosis:  Patient-requested procedure [Z41.9]    Post-Op Diagnosis Codes:     * Patient-requested procedure [Z41.9]    Procedure(s) (LRB):   SECTION () (N/A)    Surgeon(s) and Role:     * Mariia Pabon MD - Primary     * Dannie Trevino DO - Assisting     * Padmini Parsons DO - Assisting    Specimens:  ID Type Source Tests Collected by Time Destination   A :  Cord Blood Cord BLOOD GAS, VENOUS, CORD, BLOOD GAS, ARTERIAL, CORD Mariia Pabon MD 2023 1604    B :  Tissue (Placenta on Hold) OB Only Placenta PLACENTA IN STORAGE Mariia Pabon MD 2023 1608        Surgical QBL:   Surgical QBL (mL): 401 mL      Anesthesia Type: CSE    Findings:   1. Normal appearing uterus with 1cm fibroid on anterior aspect  2. Vacuum extraction required for fetal vertex delivery, vacuum on <30sec   3. Atony without hemorrhage, requiring hemabate, extra pitocin, TXA  4.  Good tone and hemostatic at conclusion of procedure      Complications:   None      SIGNATURE: Dannie Trevino DO   DATE: 2023   TIME: 5:18 PM

## 2023-08-04 NOTE — ANESTHESIA POSTPROCEDURE EVALUATION
Post-Op Assessment Note    CV Status:  Stable  Pain Score: 0    Pain management: adequate     Mental Status:  Alert and awake   Hydration Status:  Stable   PONV Controlled:  None   Airway Patency:  Patent      Post Op Vitals Reviewed: Yes      Staff: CRNA         No notable events documented.     BP   110/68   Temp   97.2   Pulse  75   Resp   12   SpO2   94 2 L NC

## 2023-08-04 NOTE — UTILIZATION REVIEW
Initial Clinical Review    Admission: Date/Time/Statement:   Admission Orders (From admission, onward)     Ordered        23 191  Inpatient Admission  Once                      Orders Placed This Encounter   Procedures   • Inpatient Admission     Standing Status:   Standing     Number of Occurrences:   1     Order Specific Question:   Level of Care     Answer:   Med Surg [16]     Order Specific Question:   Estimated length of stay     Answer:   More than 2 Midnights     Order Specific Question:   Certification     Answer:   I certify that inpatient services are medically necessary for this patient for a duration of greater than two midnights. See H&P and MD Progress Notes for additional information about the patient's course of treatment. ED Arrival Information     Patient not seen in ED                     Chief Complaint   Patient presents with   • Hypertension     Add on induction       Initial Presentation: 27 y.o. female  pmh DM1 w severe non proliferative diabetic retinopathy wo macular edema bilaterally on Insulin pump @  37w0d Inpatient admission due to induction of labor in the setting of gestational hypertension. Patient presents w elevated BP readings in OB office, & originally @ 31 & 35 wks ( 130/90 & 120/90).    EXAM  FHT Categ 1, VTX by US; GBS neg;  SVE closed/0/floating  PLAN   IV Insulin GTT once in active phase of labor, continuous monitoring, IVF; attempted Andres pt w difficulty tolerating cervical checks; Early Epidural placement, Andres, Cytotec; IV Pit titration     @ 1604 Primary C section for viable baby Boy; apgars 7 & 9. BW= 7lb 7oz due to Maternal request     Date: 8/3/2023   Day 2:   Epidural  8/3/2023 11:45 AM   @ 12:32 PM Andres placement again unsuccessful due to firm cervix & posterior postion  @ 12:53 VA Cytotec  Contraction Frequency (minutes): 3-5  Contraction Quality: Moderate  Tachysystole: No   Cervical Dilation: Fingertip  Cervical Effacement: 0  Fetal Station: Ballotable  @ 3: 40 PM  PROCEDURE:  [address]  @ 7:25 PM  Oxytocin: 0 tenzin-units/min  Contraction Frequency (minutes): high frequency, low amplitude  Contraction Quality: Mild  Tachysystole: No   Cervical Dilation: 3-4  Cervical Effacement: 30  Fetal Station: Floating; start IV Pit after her dinner      2023   DAY 3  @ 12:29 AM  Oxytocin: 8 tenzin-units/min  Contraction Frequency (minutes): 2  Contraction Quality: Mild  Tachysystole: No   Cervical Dilation: 3-4  Cervical Effacement: 48  Fetal Station: Floating  @ 0813 AROM  @ 8:47 AM  Oxytocin: 8 tenzin-units/min  Contraction Frequency (minutes): 1.5-3  Contraction Quality: Moderate  Tachysystole: No   Cervical Dilation: 3-4  Cervical Effacement: 79  Fetal Station: -3; PT considering opting for a  depending on "speed of labor progression      @ 1:05 PM  OB MD  Patient desires 1LTCS for maternal request   @ 1604 Primary C section for viable baby Boy; apgars 7 & 9. BW= 7lb 7oz  Triage Vitals   Temperature Pulse Respirations Blood Pressure SpO2   23 0144 23 1706 23 0144 23 1706 23 1128   98.1 °F (36.7 °C) 102 18 121/77 98 %      Temp Source Heart Rate Source Patient Position - Orthostatic VS BP Location FiO2 (%)   23 0144 23 0144 23 0144 23 0144 --   Oral Monitor Lying Right arm       Pain Score       23 0144       No Pain          Wt Readings from Last 1 Encounters:   23 93 kg (205 lb)     Additional Vital Signs:   Date/Time Temp Pulse Resp BP SpO2 O2 Device Patient Position - Orthostatic VS   23 1416 -- 100 -- 106/78 -- -- --   23 1401 -- 98 -- 122/77 -- -- --   23 1346 -- 109 Abnormal  -- 127/83 -- -- --   23 1332 -- 100 -- 127/79 -- -- --   23 1316 -- 101 -- 124/81 -- -- --   23 1302 99.3 °F (37.4 °C) 103 18 117/70 97 % -- Sitting   23 1246 -- 109 Abnormal  -- 121/80 -- -- --      23 0710 98.3 °F (36.8 °C) 113 Abnormal  16 123/78 -- -- --   08/03/23 0610 -- 105 -- 126/87 -- -- --   08/03/23 0144 98.1 °F (36.7 °C) 99 18 121/79 -- -- Lying     08/02/23 1822 -- 108 Abnormal  -- 131/85 -- -- --   08/02/23 1807 -- 108 Abnormal  -- 140/81 -- -- --   08/02/23 1737 -- 97 -- 122/80 -- -- --   08/02/23 1723 -- 97 -- 120/79 -- -- --   08/02/23 1706 -- 102 -- 121/77 -- -- --     Weights (last 14 days)    Date/Time Weight Weight Method Height   08/03/23 0144 93 kg (205 lb) Stated 5' 2" (1.575 m)       Pertinent Labs/Diagnostic Test Results:   No orders to display         Results from last 7 days   Lab Units 08/02/23  1754   WBC Thousand/uL 13.54*   HEMOGLOBIN g/dL 12.5   HEMATOCRIT % 35.9   PLATELETS Thousands/uL 236         Results from last 7 days   Lab Units 08/02/23  1754   SODIUM mmol/L 136   POTASSIUM mmol/L 3.8   CHLORIDE mmol/L 104   CO2 mmol/L 23   ANION GAP mmol/L 9   BUN mg/dL 11   CREATININE mg/dL 0.59*   EGFR ml/min/1.73sq m 119   CALCIUM mg/dL 9.3     Results from last 7 days   Lab Units 08/02/23  1754   AST U/L 15   ALT U/L 9   ALK PHOS U/L 123*   TOTAL PROTEIN g/dL 6.9   ALBUMIN g/dL 3.2*   TOTAL BILIRUBIN mg/dL 0.37     Results from last 7 days   Lab Units 08/04/23  1310 08/03/23  1609 08/03/23  0154 08/02/23  2328 08/02/23  2143 08/02/23  2022   POC GLUCOSE mg/dl 96 75 73 83 125 59*     Results from last 7 days   Lab Units 08/02/23  1754   GLUCOSE RANDOM mg/dL 56*         Results from last 7 days   Lab Units 08/02/23  1754 08/02/23  1537   GLUCOSE UA   --  1   PROTEIN UA   --  trace   CREATININE UR mg/dL 34.6  --    PROTEIN UR mg/dL 6  --    PROT/CREAT RATIO UR  0.17*  --               ED Treatment:   Medication Administration - No Administrations Displayed (No Start Event Found)     None        Past Medical History:   Diagnosis Date   • Anxiety    • Diabetes mellitus ([address])     type 1; since age 1; on dexcom (since summer 2022) and Tandem T-slim (<2mos) with excellent trend down in A1 (5.5!); had rough go in college years (did not take care of her diabetes); follows with LAKESHA Dhaliwal. • Elective procedure for unacceptable cosmetic appearance 2012    smart lipo   • Hypothyroidism 2012     Present on Admission:  • Gestational hypertension, third trimester  • Type 1 diabetes 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, bilateral (HCC)  • Hypothyroidism during pregnancy in third trimester  • Marginal insertion of umbilical cord affecting management of mother in third trimester  • Herpes simplex virus (HSV) infection      Admitting Diagnosis: Elevated blood pressure complicating pregnancy, antepartum [O16.9]  37 weeks gestation of pregnancy [Z3A.37]  Age/Sex: 27 y.o. female  Admission Orders:  Scheduled Medications:  azithromycin, 500 mg, Intravenous, Once  cefazolin, 2,000 mg, Intravenous, Once  docusate sodium, 100 mg, Oral, BID  levothyroxine, 150 mcg, Oral, Early Morning  valACYclovir, 500 mg, Oral, BID    Continuous IV Infusions:  dextrose 5 % and sodium chloride 0.9 %, 100 mL/hr, Intravenous, Continuous  insulin regular (HumuLIN R,NovoLIN R) 1 Units/mL in sodium chloride 0.9 % 100 mL infusion, 0.2-4 Units/hr, Intravenous, Continuous  lactated ringers, 25 mL/hr, Intravenous, Continuous  oxytocin, 1-30 tenzin-units/min, Intravenous, Titrated  ropivacaine 0.2%, , Epidural, Continuous      PRN Meds:  diphenhydrAMINE, 25 mg, Intravenous, Q6H PRN  metoclopramide, 5 mg, Intravenous, Q6H PRN  ondansetron, 4 mg, Intravenous, Q6H PRN  ondansetron, 4 mg, Intravenous, Q4H PRN        None    Network Utilization Review Department  ATTENTION: Please call with any questions or concerns to [phone] and carefully listen to the prompts so that you are directed to the right person. All voicemails are confidential.  Derrick Adam all requests for admission clinical reviews, approved or denied determinations and any other requests to dedicated fax number below belonging to the campus where the patient is receiving treatment. List of dedicated fax numbers for the Facilities:  [place] DENIALS (Administrative/Medical Necessity) [phone] [address] (Maternity/NICU/Pediatrics) [phone]   [address] [place] [address] [phone]   [facility] [address] [facility] [phone] [address] [facility] [id number] [phone]   [address] [place] [address] [facility] [phone]

## 2023-08-05 LAB
ERYTHROCYTE [DISTWIDTH] IN BLOOD BY AUTOMATED COUNT: 12.8 % (ref 11.6–15.1)
GLUCOSE SERPL-MCNC: 111 MG/DL (ref 65–140)
HCT VFR BLD AUTO: 30.1 % (ref 34.8–46.1)
HGB BLD-MCNC: 10.2 G/DL (ref 11.5–15.4)
MCH RBC QN AUTO: 30.4 PG (ref 26.8–34.3)
MCHC RBC AUTO-ENTMCNC: 33.9 G/DL (ref 31.4–37.4)
MCV RBC AUTO: 90 FL (ref 82–98)
PLATELET # BLD AUTO: 188 THOUSANDS/UL (ref 149–390)
PMV BLD AUTO: 11 FL (ref 8.9–12.7)
RBC # BLD AUTO: 3.35 MILLION/UL (ref 3.81–5.12)
WBC # BLD AUTO: 18.84 THOUSAND/UL (ref 4.31–10.16)

## 2023-08-05 PROCEDURE — 85027 COMPLETE CBC AUTOMATED: CPT | Performed by: OBSTETRICS & GYNECOLOGY

## 2023-08-05 PROCEDURE — 99024 POSTOP FOLLOW-UP VISIT: CPT | Performed by: OBSTETRICS & GYNECOLOGY

## 2023-08-05 PROCEDURE — 82948 REAGENT STRIP/BLOOD GLUCOSE: CPT

## 2023-08-05 RX ORDER — OXYCODONE HYDROCHLORIDE 5 MG/1
5 TABLET ORAL EVERY 4 HOURS PRN
Status: DISCONTINUED | OUTPATIENT
Start: 2023-08-05 | End: 2023-08-07 | Stop reason: HOSPADM

## 2023-08-05 RX ADMIN — Medication 1 TABLET: at 21:01

## 2023-08-05 RX ADMIN — KETOROLAC TROMETHAMINE 30 MG: 30 INJECTION, SOLUTION INTRAMUSCULAR; INTRAVENOUS at 00:09

## 2023-08-05 RX ADMIN — LEVOTHYROXINE SODIUM 150 MCG: 75 TABLET ORAL at 06:14

## 2023-08-05 RX ADMIN — KETOROLAC TROMETHAMINE 30 MG: 30 INJECTION, SOLUTION INTRAMUSCULAR; INTRAVENOUS at 06:13

## 2023-08-05 RX ADMIN — ACETAMINOPHEN 650 MG: 325 TABLET, FILM COATED ORAL at 15:37

## 2023-08-05 RX ADMIN — DOCUSATE SODIUM 100 MG: 100 CAPSULE, LIQUID FILLED ORAL at 08:17

## 2023-08-05 RX ADMIN — ACETAMINOPHEN 650 MG: 325 TABLET, FILM COATED ORAL at 08:51

## 2023-08-05 RX ADMIN — KETOROLAC TROMETHAMINE 30 MG: 30 INJECTION, SOLUTION INTRAMUSCULAR; INTRAVENOUS at 11:40

## 2023-08-05 RX ADMIN — KETOROLAC TROMETHAMINE 30 MG: 30 INJECTION, SOLUTION INTRAMUSCULAR; INTRAVENOUS at 18:03

## 2023-08-05 RX ADMIN — DOCUSATE SODIUM 100 MG: 100 CAPSULE, LIQUID FILLED ORAL at 18:03

## 2023-08-05 RX ADMIN — ACETAMINOPHEN 650 MG: 325 TABLET, FILM COATED ORAL at 21:01

## 2023-08-05 RX ADMIN — FOLIC ACID TAB 400 MCG 400 MCG: 400 TAB at 21:01

## 2023-08-05 RX ADMIN — ACETAMINOPHEN 650 MG: 325 TABLET, FILM COATED ORAL at 03:30

## 2023-08-05 RX ADMIN — ENOXAPARIN SODIUM 40 MG: 40 INJECTION SUBCUTANEOUS at 08:17

## 2023-08-05 NOTE — PLAN OF CARE
Problem: PAIN - ADULT  Goal: Verbalizes/displays adequate comfort level or baseline comfort level  Description: Interventions:  - Encourage patient to monitor pain and request assistance  - Assess pain using appropriate pain scale  - Administer analgesics based on type and severity of pain and evaluate response  - Implement non-pharmacological measures as appropriate and evaluate response  - Consider cultural and social influences on pain and pain management  - Notify physician/advanced practitioner if interventions unsuccessful or patient reports new pain  8/4/2023 2119 by Cresencio Meier RN  Outcome: Progressing  8/4/2023 2119 by Cresencio Meier RN  Outcome: Progressing     Problem: INFECTION - ADULT  Goal: Absence or prevention of progression during hospitalization  Description: INTERVENTIONS:  - Assess and monitor for signs and symptoms of infection  - Monitor lab/diagnostic results  - Monitor all insertion sites, i.e. indwelling lines, tubes, and drains  - Monitor endotracheal if appropriate and nasal secretions for changes in amount and color  - [place] appropriate cooling/warming therapies per order  - Administer medications as ordered  - Instruct and encourage patient and family to use good hand hygiene technique  - Identify and instruct in appropriate isolation precautions for identified infection/condition  8/4/2023 2119 by Cresencio Meier RN  Outcome: Progressing  8/4/2023 2119 by Cresencio Meier RN  Outcome: Progressing  Goal: Absence of fever/infection during neutropenic period  Description: INTERVENTIONS:  - Monitor WBC    8/4/2023 2119 by Cresencio Meier RN  Outcome: Progressing  8/4/2023 2119 by Cresencio Meier RN  Outcome: Progressing     Problem: SAFETY ADULT  Goal: Patient will remain free of falls  Description: INTERVENTIONS:  - Educate patient/family on patient safety including physical limitations  - Instruct patient to call for assistance with activity   - Consult OT/PT to assist with strengthening/mobility   - Keep Call bell within reach  - Keep bed low and locked with side rails adjusted as appropriate  - Keep care items and personal belongings within reach  - Initiate and maintain comfort rounds  - Make Fall Risk Sign visible to staff  - Apply yellow socks and bracelet for high fall risk patients  - Consider moving patient to room near nurses station  8/4/2023 2119 by Denton Durham RN  Outcome: Progressing  8/4/2023 2119 by Denton Durham RN  Outcome: Progressing  Goal: Maintain or return to baseline ADL function  Description: INTERVENTIONS:  -  Assess patient's ability to carry out ADLs; assess patient's baseline for ADL function and identify physical deficits which impact ability to perform ADLs (bathing, care of mouth/teeth, toileting, grooming, dressing, etc.)  - Assess/evaluate cause of self-care deficits   - Assess range of motion  - Assess patient's mobility; develop plan if impaired  - Assess patient's need for assistive devices and provide as appropriate  - Encourage maximum independence but intervene and supervise when necessary  - Involve family in performance of ADLs  - Assess for home care needs following discharge   - Consider OT consult to assist with ADL evaluation and planning for discharge  - Provide patient education as appropriate  8/4/2023 2119 by Denton Durham RN  Outcome: Progressing  8/4/2023 2119 by Denton Durham RN  Outcome: Progressing  Goal: Maintains/Returns to pre admission functional level  Description: INTERVENTIONS:  - Perform BMAT or MOVE assessment daily.   - Set and communicate daily mobility goal to care team and patient/family/caregiver.    - Collaborate with rehabilitation services on mobility goals if consulted  - - Out of bed for toileting  - Record patient progress and toleration of activity level   8/4/2023 2119 by Denton Durham RN  Outcome: Progressing  8/4/2023 2119 by Denton Durham RN  Outcome: Progressing     Problem: DISCHARGE PLANNING  Goal: Discharge to home or other facility with appropriate resources  Description: INTERVENTIONS:  - Identify barriers to discharge w/patient and caregiver  - Arrange for needed discharge resources and transportation as appropriate  - Identify discharge learning needs (meds, wound care, etc.)  - Arrange for interpretive services to assist at discharge as needed  - Refer to Case Management Department for coordinating discharge planning if the patient needs post-hospital services based on physician/advanced practitioner order or complex needs related to functional status, cognitive ability, or social support system  2023 by Michael Jackson, RN  Outcome: Progressing  2023 by Michael Jackson, RN  Outcome: Progressing     Problem: POSTPARTUM  Goal: Experiences normal postpartum course  Description: INTERVENTIONS:  - Monitor maternal vital signs  - Assess uterine involution and lochia  Outcome: Progressing  Goal: Appropriate maternal -  bonding  Description: INTERVENTIONS:  - Identify family support  - Assess for appropriate maternal/infant bonding   -Encourage maternal/infant bonding opportunities  - Referral to  or  as needed  Outcome: Progressing  Goal: Establishment of infant feeding pattern  Description: INTERVENTIONS:  - Assess breast/bottle feeding  - Refer to lactation as needed  Outcome: Progressing  Goal: Incision(s), wounds(s) or drain site(s) healing without S/S of infection  Description: INTERVENTIONS  - Assess and document dressing, incision, wound bed, drain sites and surrounding tissue  - Provide patient and family education    Outcome: Progressing

## 2023-08-05 NOTE — PROGRESS NOTES
Progress Note - OB/GYN   Philip Damon 27 y.o. female MRN: [id number]  Unit/Bed#: -01 Encounter: [id number]      Assessment/Plan    Philip Damon is a 27 y.o. Sana Res who is POD #1 s/p 1LTCS for maternal requesr at 37w2d     * Status post primary low transverse  section  Assessment & Plan  • Routine postpartum care  • Encourage ambulation  • Encourage familial bonding  • Lactation support as needed  • Lovenox 40 daily, SCDs  • Andres still in, vt [ ]   • Pain: Motrin/Tylenol around the clock, oxycodone if needed            Gestational hypertension, third trimester  Assessment & Plan  CBC/CMP wnl  P:Cr: 0.17  Continue to monitor BP  Induction for GHTN    Systolic (52TTR), DRF:862 , Min:90 , VMU:466   Diastolic (40YOY), UYK:15, Min:50, Max:93      Hypothyroidism during pregnancy in third trimester  Assessment & Plan  Home regimen of levothyroxine ordered    Herpes simplex virus (HSV) infection  Assessment & Plan  Valtrex ordered  Speculum exam to confirm no active lesions    Type 1 diabetes 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, bilateral [facility])  Assessment & Plan  Lab Results   Component Value Date    HGBA1C 5.0 2023       Recent Labs     23  1548 23  1620 23  1732 23  1834   POCGLU 74 80 93 126       Blood Sugar Average: Last 72 hrs:  (P) [id number]     Patient has insulin pump placed  Q2h glucose in labor  Using insulin pump pp         Disposition: Anticipate discharge home postpartum Day #2-3  Barriers to discharge: none       Subjective/Objective     Subjective: Postpartum state    Pain: no  Tolerating PO: yes  Voiding: yes  Flatus: yes  Ambulating: yes  Breastfeeding: Bottle feeding  Chest pain: no  Shortness of breath: no  Leg pain: no  Lochia: wnl    Objective:     Vitals:  Vitals:    23 2100 23 2209 23 2306 23 0415   BP: 127/78 135/76 122/75 96/55   BP Location: Right arm Left arm Left arm Left arm   Pulse: 86 92 91 78   Resp: 18 18 18 16   Temp: 99.2 °F (37.3 °C) 98.7 °F (37.1 °C) 98.1 °F (36.7 °C) 98.4 °F (36.9 °C)   TempSrc: Oral Oral Oral Oral   SpO2: 95% 96% 95% 93%   Weight:       Height:           Physical Exam:   GEN: appears well, alert and oriented x 3, pleasant and cooperative   CV: Regular rate  RESP: non labored breathing  ABDOMEN: soft, no tenderness, no distention, Uterine fundus firm and non-tender, -1 cm below the umbilicus, incision c/d/i  EXTREMITIES: non-tender  NEURO Alert and oriented to person, place, and time.        Lab Results   Component Value Date    WBC 13.54 (H) 08/02/2023    HGB 12.5 08/02/2023    HCT 35.9 08/02/2023    MCV 88 08/02/2023     08/02/2023         Ysabel Matt DO  Obstetrics & Gynecology  08/05/23

## 2023-08-05 NOTE — PLAN OF CARE

## 2023-08-06 PROCEDURE — 99024 POSTOP FOLLOW-UP VISIT: CPT | Performed by: OBSTETRICS & GYNECOLOGY

## 2023-08-06 RX ADMIN — KETOROLAC TROMETHAMINE 30 MG: 30 INJECTION, SOLUTION INTRAMUSCULAR; INTRAVENOUS at 06:03

## 2023-08-06 RX ADMIN — ACETAMINOPHEN 650 MG: 325 TABLET, FILM COATED ORAL at 15:52

## 2023-08-06 RX ADMIN — IBUPROFEN 600 MG: 600 TABLET ORAL at 19:34

## 2023-08-06 RX ADMIN — LEVOTHYROXINE SODIUM 75 MCG: 75 TABLET ORAL at 06:04

## 2023-08-06 RX ADMIN — DOCUSATE SODIUM 100 MG: 100 CAPSULE, LIQUID FILLED ORAL at 18:10

## 2023-08-06 RX ADMIN — ACETAMINOPHEN 650 MG: 325 TABLET, FILM COATED ORAL at 03:06

## 2023-08-06 RX ADMIN — KETOROLAC TROMETHAMINE 30 MG: 30 INJECTION, SOLUTION INTRAMUSCULAR; INTRAVENOUS at 00:29

## 2023-08-06 RX ADMIN — ENOXAPARIN SODIUM 40 MG: 40 INJECTION SUBCUTANEOUS at 09:10

## 2023-08-06 RX ADMIN — ACETAMINOPHEN 650 MG: 325 TABLET, FILM COATED ORAL at 22:42

## 2023-08-06 RX ADMIN — ACETAMINOPHEN 650 MG: 325 TABLET, FILM COATED ORAL at 09:10

## 2023-08-06 RX ADMIN — IBUPROFEN 600 MG: 600 TABLET ORAL at 13:27

## 2023-08-06 NOTE — PLAN OF CARE
Problem: PAIN - ADULT  Goal: Verbalizes/displays adequate comfort level or baseline comfort level  Description: Interventions:  - Encourage patient to monitor pain and request assistance  - Assess pain using appropriate pain scale  - Administer analgesics based on type and severity of pain and evaluate response  - Implement non-pharmacological measures as appropriate and evaluate response  - Consider cultural and social influences on pain and pain management  - Notify physician/advanced practitioner if interventions unsuccessful or patient reports new pain  Outcome: Progressing     Problem: INFECTION - ADULT  Goal: Absence or prevention of progression during hospitalization  Description: INTERVENTIONS:  - Assess and monitor for signs and symptoms of infection  - Monitor lab/diagnostic results  - Monitor all insertion sites, i.e. indwelling lines, tubes, and drains  - Monitor endotracheal if appropriate and nasal secretions for changes in amount and color  - [place] appropriate cooling/warming therapies per order  - Administer medications as ordered  - Instruct and encourage patient and family to use good hand hygiene technique  - Identify and instruct in appropriate isolation precautions for identified infection/condition  Outcome: Progressing  Goal: Absence of fever/infection during neutropenic period  Description: INTERVENTIONS:  - Monitor WBC    Outcome: Progressing     Problem: SAFETY ADULT  Goal: Patient will remain free of falls  Description: INTERVENTIONS:  - Educate patient/family on patient safety including physical limitations  - Instruct patient to call for assistance with activity   - Consult OT/PT to assist with strengthening/mobility   - Keep Call bell within reach  - Keep bed low and locked with side rails adjusted as appropriate  - Keep care items and personal belongings within reach  - Initiate and maintain comfort rounds  - Make Fall Risk Sign visible to staff  - Apply yellow socks and bracelet for high fall risk patients  - Consider moving patient to room near nurses station  Outcome: Progressing  Goal: Maintain or return to baseline ADL function  Description: INTERVENTIONS:  -  Assess patient's ability to carry out ADLs; assess patient's baseline for ADL function and identify physical deficits which impact ability to perform ADLs (bathing, care of mouth/teeth, toileting, grooming, dressing, etc.)  - Assess/evaluate cause of self-care deficits   - Assess range of motion  - Assess patient's mobility; develop plan if impaired  - Assess patient's need for assistive devices and provide as appropriate  - Encourage maximum independence but intervene and supervise when necessary  - Involve family in performance of ADLs  - Assess for home care needs following discharge   - Consider OT consult to assist with ADL evaluation and planning for discharge  - Provide patient education as appropriate  Outcome: Progressing  Goal: Maintains/Returns to pre admission functional level  Description: INTERVENTIONS:  - Perform BMAT or MOVE assessment daily.   - Set and communicate daily mobility goal to care team and patient/family/caregiver.    - Collaborate with rehabilitation services on mobility goals if consulted  - Out of bed for toileting  - Record patient progress and toleration of activity level   Outcome: Progressing     Problem: DISCHARGE PLANNING  Goal: Discharge to home or other facility with appropriate resources  Description: INTERVENTIONS:  - Identify barriers to discharge w/patient and caregiver  - Arrange for needed discharge resources and transportation as appropriate  - Identify discharge learning needs (meds, wound care, etc.)  - Arrange for interpretive services to assist at discharge as needed  - Refer to Case Management Department for coordinating discharge planning if the patient needs post-hospital services based on physician/advanced practitioner order or complex needs related to functional status, cognitive ability, or social support system  Outcome: Progressing     Problem: POSTPARTUM  Goal: Experiences normal postpartum course  Description: INTERVENTIONS:  - Monitor maternal vital signs  - Assess uterine involution and lochia  Outcome: Progressing  Goal: Appropriate maternal -  bonding  Description: INTERVENTIONS:  - Identify family support  - Assess for appropriate maternal/infant bonding   -Encourage maternal/infant bonding opportunities  - Referral to  or  as needed  Outcome: Progressing  Goal: Establishment of infant feeding pattern  Description: INTERVENTIONS:  - Assess breast/bottle feeding  - Refer to lactation as needed  Outcome: Progressing  Goal: Incision(s), wounds(s) or drain site(s) healing without S/S of infection  Description: INTERVENTIONS  - Assess and document dressing, incision, wound bed, drain sites and surrounding tissue  - Provide patient and family education  Outcome: Progressing

## 2023-08-06 NOTE — PLAN OF CARE
Problem: PAIN - ADULT  Goal: Verbalizes/displays adequate comfort level or baseline comfort level  Description: Interventions:  - Encourage patient to monitor pain and request assistance  - Assess pain using appropriate pain scale  - Administer analgesics based on type and severity of pain and evaluate response  - Implement non-pharmacological measures as appropriate and evaluate response  - Consider cultural and social influences on pain and pain management  - Notify physician/advanced practitioner if interventions unsuccessful or patient reports new pain  Outcome: Progressing     Problem: INFECTION - ADULT  Goal: Absence or prevention of progression during hospitalization  Description: INTERVENTIONS:  - Assess and monitor for signs and symptoms of infection  - Monitor lab/diagnostic results  - Monitor all insertion sites, i.e. indwelling lines, tubes, and drains  - Monitor endotracheal if appropriate and nasal secretions for changes in amount and color  - [place] appropriate cooling/warming therapies per order  - Administer medications as ordered  - Instruct and encourage patient and family to use good hand hygiene technique  - Identify and instruct in appropriate isolation precautions for identified infection/condition  Outcome: Progressing  Goal: Absence of fever/infection during neutropenic period  Description: INTERVENTIONS:  - Monitor WBC    Outcome: Progressing     Problem: SAFETY ADULT  Goal: Patient will remain free of falls  Description: INTERVENTIONS:  - Educate patient/family on patient safety including physical limitations  - Instruct patient to call for assistance with activity   - Consult OT/PT to assist with strengthening/mobility   - Keep Call bell within reach  - Keep bed low and locked with side rails adjusted as appropriate  - Keep care items and personal belongings within reach  - Initiate and maintain comfort rounds  - Make Fall Risk Sign visible to staff  - - Apply yellow socks and bracelet for high fall risk patients  - Consider moving patient to room near nurses station  Outcome: Progressing  Goal: Maintain or return to baseline ADL function  Description: INTERVENTIONS:  -  Assess patient's ability to carry out ADLs; assess patient's baseline for ADL function and identify physical deficits which impact ability to perform ADLs (bathing, care of mouth/teeth, toileting, grooming, dressing, etc.)  - Assess/evaluate cause of self-care deficits   - Assess range of motion  - Assess patient's mobility; develop plan if impaired  - Assess patient's need for assistive devices and provide as appropriate  - Encourage maximum independence but intervene and supervise when necessary  - Involve family in performance of ADLs  - Assess for home care needs following discharge   - Consider OT consult to assist with ADL evaluation and planning for discharge  - Provide patient education as appropriate  Outcome: Progressing  Goal: Maintains/Returns to pre admission functional level  Description: INTERVENTIONS:  - Perform BMAT or MOVE assessment daily.   - Set and communicate daily mobility goal to care team and patient/family/caregiver.    - Collaborate with rehabilitation services on mobility goals if consulted  - - Out of bed for toileting  - Record patient progress and toleration of activity level   Outcome: Progressing     Problem: DISCHARGE PLANNING  Goal: Discharge to home or other facility with appropriate resources  Description: INTERVENTIONS:  - Identify barriers to discharge w/patient and caregiver  - Arrange for needed discharge resources and transportation as appropriate  - Identify discharge learning needs (meds, wound care, etc.)  - Arrange for interpretive services to assist at discharge as needed  - Refer to Case Management Department for coordinating discharge planning if the patient needs post-hospital services based on physician/advanced practitioner order or complex needs related to functional status, cognitive ability, or social support system  Outcome: Progressing     Problem: POSTPARTUM  Goal: Experiences normal postpartum course  Description: INTERVENTIONS:  - Monitor maternal vital signs  - Assess uterine involution and lochia  Outcome: Progressing  Goal: Appropriate maternal -  bonding  Description: INTERVENTIONS:  - Identify family support  - Assess for appropriate maternal/infant bonding   -Encourage maternal/infant bonding opportunities  - Referral to  or  as needed  Outcome: Progressing  Goal: Establishment of infant feeding pattern  Description: INTERVENTIONS:  - Assess breast/bottle feeding  - Refer to lactation as needed  Outcome: Progressing  Goal: Incision(s), wounds(s) or drain site(s) healing without S/S of infection  Description: INTERVENTIONS  - Assess and document dressing, incision, wound bed, drain sites and surrounding tissue  - Provide patient and family education  - Outcome: Progressing

## 2023-08-06 NOTE — PROGRESS NOTES
Progress Note - OB/GYN   Jason Villalta 27 y.o. female MRN: [id number]  Unit/Bed#: -01 Encounter: [id number]      Assessment/Plan    Jason Villalta is a 27 y.o. Zeinab Candle who is POD day 2 s/p 1LTCS at 37w2d     * Status post primary low transverse  section  Assessment & Plan  • Routine postpartum care  • Encourage ambulation  • Encourage familial bonding  • Lactation support as needed  • Lovenox 40 daily, SCDs  • Passed her void trial  • Pain: Motrin/Tylenol around the clock, oxycodone if needed            Gestational hypertension, third trimester  Assessment & Plan  CBC/CMP wnl  P:Cr: 0.17  Continue to monitor BP  Induction for GHTN    Systolic (35EPY), QXW:774 , Min:109 , CAK:484   Diastolic (16CQN), VYN:02, Min:67, Max:81      Hypothyroidism during pregnancy in third trimester  Assessment & Plan  Home regimen of levothyroxine ordered    Herpes simplex virus (HSV) infection  Assessment & Plan  Valtrex ordered  Speculum exam to confirm no active lesions    Type 1 diabetes 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, bilateral [facility])  Assessment & Plan  Lab Results   Component Value Date    HGBA1C 5.0 2023       Recent Labs     23  1548 23  1620 23  1732 23  1834   POCGLU 74 80 93 126       Blood Sugar Average: Last 72 hrs:  (P) 85.00210360206189883     Patient has insulin pump placed  Using insulin pump pp  Patient is checking her own sugars and refusing checks from nursing staff         Disposition: Anticipate discharge home postpartum Day #3  Barriers to discharge: ongoing couplet care       Subjective/Objective     Subjective: Postpartum state    Pain: no  Tolerating PO: yes  Voiding: yes  Flatus: yes  BM: yes  Ambulating: yes  Breastfeeding: Bottle feeding  Chest pain: no  Shortness of breath: no  Leg pain: no  Lochia: wnl    Objective:     Vitals:  Vitals:    23 1608 23 2054 23 0045 23 0424   BP: 132/76 137/81 128/76 116/75   BP Location: Left arm Left arm Right arm Left arm   Pulse: (!) 110 (!) 107 96 92   Resp: 18 18 18 18   Temp: 99.6 °F (37.6 °C) 99.8 °F (37.7 °C) 98.2 °F (36.8 °C) 98.3 °F (36.8 °C)   TempSrc: Oral Oral Oral Oral   SpO2: 98% 99% 96% 95%   Weight:       Height:           Physical Exam:   GEN: appears well, alert and oriented x 3, pleasant and cooperative   CV: Regular rate  RESP: non labored breathing  ABDOMEN: soft, no tenderness, no distention, Uterine fundus firm and non-tender, -1 cm below the umbilicus, incision c/d/i  EXTREMITIES: non-tender  NEURO Alert and oriented to person, place, and time.        Lab Results   Component Value Date    WBC 18.84 (H) 08/05/2023    HGB 10.2 (L) 08/05/2023    HCT 30.1 (L) 08/05/2023    MCV 90 08/05/2023     08/05/2023         Bean Lizarraga DO  Obstetrics & Gynecology  08/06/23

## 2023-08-07 VITALS
SYSTOLIC BLOOD PRESSURE: 121 MMHG | OXYGEN SATURATION: 99 % | TEMPERATURE: 98.7 F | HEART RATE: 87 BPM | DIASTOLIC BLOOD PRESSURE: 80 MMHG | RESPIRATION RATE: 18 BRPM | HEIGHT: 62 IN | BODY MASS INDEX: 37.73 KG/M2 | WEIGHT: 205 LBS

## 2023-08-07 PROCEDURE — 99024 POSTOP FOLLOW-UP VISIT: CPT | Performed by: OBSTETRICS & GYNECOLOGY

## 2023-08-07 RX ORDER — ACETAMINOPHEN 325 MG/1
650 TABLET ORAL EVERY 6 HOURS PRN
Refills: 0
Start: 2023-08-07

## 2023-08-07 RX ORDER — ALBUTEROL SULFATE 2.5 MG/3ML
2.5 SOLUTION RESPIRATORY (INHALATION) ONCE AS NEEDED
Refills: 0
Start: 2023-08-07

## 2023-08-07 RX ORDER — IBUPROFEN 600 MG/1
600 TABLET ORAL EVERY 6 HOURS SCHEDULED
Qty: 30 TABLET | Refills: 0
Start: 2023-08-07

## 2023-08-07 RX ADMIN — LEVOTHYROXINE SODIUM 150 MCG: 75 TABLET ORAL at 06:50

## 2023-08-07 RX ADMIN — DOCUSATE SODIUM 100 MG: 100 CAPSULE, LIQUID FILLED ORAL at 08:23

## 2023-08-07 RX ADMIN — IBUPROFEN 600 MG: 600 TABLET ORAL at 01:10

## 2023-08-07 RX ADMIN — ENOXAPARIN SODIUM 40 MG: 40 INJECTION SUBCUTANEOUS at 08:23

## 2023-08-07 RX ADMIN — IBUPROFEN 600 MG: 600 TABLET ORAL at 06:50

## 2023-08-07 RX ADMIN — Medication 1 TABLET: at 08:23

## 2023-08-07 RX ADMIN — ACETAMINOPHEN 650 MG: 325 TABLET, FILM COATED ORAL at 05:02

## 2023-08-07 NOTE — PROGRESS NOTES
Progress Note - OB/GYN   Uli Zavaleta 27 y.o. female MRN: [id number]  Unit/Bed#: -01 Encounter: [id number]      Assessment/Plan    Uli Zavaleta is a 27 y.o.  who is POD #3 s/p 1LTCS at 43w1d     * Status post primary low transverse  section  Assessment & Plan  • Routine postpartum care  • Encourage ambulation  • Encourage familial bonding  • Lactation support as needed  • Lovenox 40 daily, SCDs  • Passed her void trial  • Pain: Motrin/Tylenol around the clock, oxycodone if needed            Gestational hypertension, third trimester  Assessment & Plan  CBC/CMP wnl  P:Cr: 0.17  Continue to monitor BP  Induction for GHTN    Systolic (72UMB), BUY:894 , Min:109 , JLE:070   Diastolic (27COS), RHJ:79, Min:67, Max:81      Hypothyroidism during pregnancy in third trimester  Assessment & Plan  Home regimen of levothyroxine ordered    Herpes simplex virus (HSV) infection  Assessment & Plan  Valtrex ordered  Speculum exam to confirm no active lesions    Type 1 diabetes 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, bilateral [facility])  Assessment & Plan  Lab Results   Component Value Date    HGBA1C 5.0 2023       Recent Labs     23  1548 23  1620 23  1732 23  1834   POCGLU 74 80 93 126       Blood Sugar Average: Last 72 hrs:  (P) [id number]     Patient has insulin pump placed  Using insulin pump pp  Patient is checking her own sugars and refusing checks from nursing staff           Disposition: Anticipate discharge home postpartum Day #3  Barriers to discharge: none       Subjective/Objective     Subjective: Postpartum state    Pain: no  Tolerating PO: yes  Voiding: yes  Flatus: yes  BM: yes  Ambulating: yes  Breastfeeding: Bottle feeding  Chest pain: no  Shortness of breath: no  Leg pain: no  Lochia: wnl    Objective:     Vitals:  Vitals:    23 0424 23 0908 23 1558 23 2309   BP: 116/75 134/81 117/72 125/68   BP Location: Left arm Left arm Right arm Right arm   Pulse: 92 102 98 97   Resp: 18 16 18 18   Temp: 98.3 °F (36.8 °C) 98.3 °F (36.8 °C) 99.6 °F (37.6 °C) 99.1 °F (37.3 °C)   TempSrc: Oral Oral Oral Oral   SpO2: 95% 99% 99% 99%   Weight:       Height:           Physical Exam:   GEN: appears well, alert and oriented x 3, pleasant and cooperative   CV: Regular rate  RESP: non labored breathing  ABDOMEN: soft, no tenderness, no distention, Uterine fundus firm and non-tender, -1 cm below the umbilicus, incision c/d/i  EXTREMITIES: non-tender  NEURO Alert and oriented to person, place, and time.        Lab Results   Component Value Date    WBC 18.84 (H) 08/05/2023    HGB 10.2 (L) 08/05/2023    HCT 30.1 (L) 08/05/2023    MCV 90 08/05/2023     08/05/2023         Lorri Roper DO  Obstetrics & Gynecology  08/07/23

## 2023-08-07 NOTE — PLAN OF CARE
Problem: PAIN - ADULT  Goal: Verbalizes/displays adequate comfort level or baseline comfort level  Description: Interventions:  - Encourage patient to monitor pain and request assistance  - Assess pain using appropriate pain scale  - Administer analgesics based on type and severity of pain and evaluate response  - Implement non-pharmacological measures as appropriate and evaluate response  - Consider cultural and social influences on pain and pain management  - Notify physician/advanced practitioner if interventions unsuccessful or patient reports new pain  Outcome: Adequate for Discharge     Problem: INFECTION - ADULT  Goal: Absence or prevention of progression during hospitalization  Description: INTERVENTIONS:  - Assess and monitor for signs and symptoms of infection  - Monitor lab/diagnostic results  - Monitor all insertion sites, i.e. indwelling lines, tubes, and drains  - Monitor endotracheal if appropriate and nasal secretions for changes in amount and color  - [place] appropriate cooling/warming therapies per order  - Administer medications as ordered  - Instruct and encourage patient and family to use good hand hygiene technique  - Identify and instruct in appropriate isolation precautions for identified infection/condition  Outcome: Adequate for Discharge  Goal: Absence of fever/infection during neutropenic period  Description: INTERVENTIONS:  - Monitor WBC    Outcome: Adequate for Discharge     Problem: SAFETY ADULT  Goal: Patient will remain free of falls  Description: INTERVENTIONS:  - Educate patient/family on patient safety including physical limitations  - Instruct patient to call for assistance with activity   - Consult OT/PT to assist with strengthening/mobility   - Keep Call bell within reach  - Keep bed low and locked with side rails adjusted as appropriate  - Keep care items and personal belongings within reach  - Initiate and maintain comfort rounds  - Apply yellow socks and bracelet for high fall risk patients  - Consider moving patient to room near nurses station  Outcome: Adequate for Discharge  Goal: Maintain or return to baseline ADL function  Description: INTERVENTIONS:  -  Assess patient's ability to carry out ADLs; assess patient's baseline for ADL function and identify physical deficits which impact ability to perform ADLs (bathing, care of mouth/teeth, toileting, grooming, dressing, etc.)  - Assess/evaluate cause of self-care deficits   - Assess range of motion  - Assess patient's mobility; develop plan if impaired  - Assess patient's need for assistive devices and provide as appropriate  - Encourage maximum independence but intervene and supervise when necessary  - Involve family in performance of ADLs  - Assess for home care needs following discharge   - Consider OT consult to assist with ADL evaluation and planning for discharge  - Provide patient education as appropriate  Outcome: Adequate for Discharge  Goal: Maintains/Returns to pre admission functional level  Description: INTERVENTIONS:  - Perform BMAT or MOVE assessment daily.   - Set and communicate daily mobility goal to care team and patient/family/caregiver.    - Collaborate with rehabilitation services on mobility goals if consulted  - Out of bed for toileting  - Record patient progress and toleration of activity level   Outcome: Adequate for Discharge     Problem: DISCHARGE PLANNING  Goal: Discharge to home or other facility with appropriate resources  Description: INTERVENTIONS:  - Identify barriers to discharge w/patient and caregiver  - Arrange for needed discharge resources and transportation as appropriate  - Identify discharge learning needs (meds, wound care, etc.)  - Arrange for interpretive services to assist at discharge as needed  - Refer to Case Management Department for coordinating discharge planning if the patient needs post-hospital services based on physician/advanced practitioner order or complex needs related to functional status, cognitive ability, or social support system  Outcome: Adequate for Discharge     Problem: POSTPARTUM  Goal: Experiences normal postpartum course  Description: INTERVENTIONS:  - Monitor maternal vital signs  - Assess uterine involution and lochia  Outcome: Adequate for Discharge  Goal: Appropriate maternal -  bonding  Description: INTERVENTIONS:  - Identify family support  - Assess for appropriate maternal/infant bonding   -Encourage maternal/infant bonding opportunities  - Referral to  or  as needed  Outcome: Adequate for Discharge  Goal: Establishment of infant feeding pattern  Description: INTERVENTIONS:  - Assess breast/bottle feeding  - Refer to lactation as needed  Outcome: Adequate for Discharge  Goal: Incision(s), wounds(s) or drain site(s) healing without S/S of infection  Description: INTERVENTIONS  - Assess and document dressing, incision, wound bed, drain sites and surrounding tissue  - Provide patient and family education  Outcome: Adequate for Discharge

## 2023-08-07 NOTE — PLAN OF CARE
Problem: PAIN - ADULT  Goal: Verbalizes/displays adequate comfort level or baseline comfort level  Description: Interventions:  - Encourage patient to monitor pain and request assistance  - Assess pain using appropriate pain scale  - Administer analgesics based on type and severity of pain and evaluate response  - Implement non-pharmacological measures as appropriate and evaluate response  - Consider cultural and social influences on pain and pain management  - Notify physician/advanced practitioner if interventions unsuccessful or patient reports new pain  Outcome: Progressing     Problem: INFECTION - ADULT  Goal: Absence or prevention of progression during hospitalization  Description: INTERVENTIONS:  - Assess and monitor for signs and symptoms of infection  - Monitor lab/diagnostic results  - Monitor all insertion sites, i.e. indwelling lines, tubes, and drains  - Monitor endotracheal if appropriate and nasal secretions for changes in amount and color  - [place] appropriate cooling/warming therapies per order  - Administer medications as ordered  - Instruct and encourage patient and family to use good hand hygiene technique  - Identify and instruct in appropriate isolation precautions for identified infection/condition  Outcome: Progressing  Goal: Absence of fever/infection during neutropenic period  Description: INTERVENTIONS:  - Monitor WBC    Outcome: Progressing     Problem: SAFETY ADULT  Goal: Patient will remain free of falls  Description: INTERVENTIONS:  - Educate patient/family on patient safety including physical limitations  - Instruct patient to call for assistance with activity   - Consult OT/PT to assist with strengthening/mobility   - Keep Call bell within reach  - Keep bed low and locked with side rails adjusted as appropriate  - Keep care items and personal belongings within reach  - Initiate and maintain comfort rounds  - Make Fall Risk Sign visible to staff  - Apply yellow socks and bracelet for high fall risk patients  - Consider moving patient to room near nurses station  Outcome: Progressing  Goal: Maintain or return to baseline ADL function  Description: INTERVENTIONS:  -  Assess patient's ability to carry out ADLs; assess patient's baseline for ADL function and identify physical deficits which impact ability to perform ADLs (bathing, care of mouth/teeth, toileting, grooming, dressing, etc.)  - Assess/evaluate cause of self-care deficits   - Assess range of motion  - Assess patient's mobility; develop plan if impaired  - Assess patient's need for assistive devices and provide as appropriate  - Encourage maximum independence but intervene and supervise when necessary  - Involve family in performance of ADLs  - Assess for home care needs following discharge   - Consider OT consult to assist with ADL evaluation and planning for discharge  - Provide patient education as appropriate  Outcome: Progressing  Goal: Maintains/Returns to pre admission functional level  Description: INTERVENTIONS:  - Perform BMAT or MOVE assessment daily.   - Set and communicate daily mobility goal to care team and patient/family/caregiver.    - Collaborate with rehabilitation services on mobility goals if consulted  - Out of bed for toileting  - Record patient progress and toleration of activity level   Outcome: Progressing     Problem: DISCHARGE PLANNING  Goal: Discharge to home or other facility with appropriate resources  Description: INTERVENTIONS:  - Identify barriers to discharge w/patient and caregiver  - Arrange for needed discharge resources and transportation as appropriate  - Identify discharge learning needs (meds, wound care, etc.)  - Arrange for interpretive services to assist at discharge as needed  - Refer to Case Management Department for coordinating discharge planning if the patient needs post-hospital services based on physician/advanced practitioner order or complex needs related to functional status, cognitive ability, or social support system  Outcome: Progressing     Problem: POSTPARTUM  Goal: Experiences normal postpartum course  Description: INTERVENTIONS:  - Monitor maternal vital signs  - Assess uterine involution and lochia  Outcome: Progressing  Goal: Appropriate maternal -  bonding  Description: INTERVENTIONS:  - Identify family support  - Assess for appropriate maternal/infant bonding   -Encourage maternal/infant bonding opportunities  - Referral to  or  as needed  Outcome: Progressing  Goal: Establishment of infant feeding pattern  Description: INTERVENTIONS:  - Assess breast/bottle feeding  - Refer to lactation as needed  Outcome: Progressing  Goal: Incision(s), wounds(s) or drain site(s) healing without S/S of infection  Description: INTERVENTIONS  - Assess and document dressing, incision, wound bed, drain sites and surrounding tissue  - Provide patient and family education  Outcome: Progressing

## 2023-08-07 NOTE — PLAN OF CARE
Problem: PAIN - ADULT  Goal: Verbalizes/displays adequate comfort level or baseline comfort level  Description: Interventions:  - Encourage patient to monitor pain and request assistance  - Assess pain using appropriate pain scale  - Administer analgesics based on type and severity of pain and evaluate response  - Implement non-pharmacological measures as appropriate and evaluate response  - Consider cultural and social influences on pain and pain management  - Notify physician/advanced practitioner if interventions unsuccessful or patient reports new pain  8/7/2023 1204 by Arthur Bernstein RN  Outcome: Completed  8/7/2023 0927 by Arthur Bernstein RN  Outcome: Adequate for Discharge     Problem: INFECTION - ADULT  Goal: Absence or prevention of progression during hospitalization  Description: INTERVENTIONS:  - Assess and monitor for signs and symptoms of infection  - Monitor lab/diagnostic results  - Monitor all insertion sites, i.e. indwelling lines, tubes, and drains  - Monitor endotracheal if appropriate and nasal secretions for changes in amount and color  - [place] appropriate cooling/warming therapies per order  - Administer medications as ordered  - Instruct and encourage patient and family to use good hand hygiene technique  - Identify and instruct in appropriate isolation precautions for identified infection/condition  8/7/2023 1204 by Arthur Bernstein RN  Outcome: Completed  8/7/2023 0927 by Arthur Bernstein RN  Outcome: Adequate for Discharge  Goal: Absence of fever/infection during neutropenic period  Description: INTERVENTIONS:  - Monitor WBC    8/7/2023 1204 by Arthur Bernstein RN  Outcome: Completed  8/7/2023 0927 by Arthur Bernstein RN  Outcome: Adequate for Discharge     Problem: SAFETY ADULT  Goal: Patient will remain free of falls  Description: INTERVENTIONS:  - Educate patient/family on patient safety including physical limitations  - Instruct patient to call for assistance with activity   - Consult OT/PT to assist with strengthening/mobility   - Keep Call bell within reach  - Keep bed low and locked with side rails adjusted as appropriate  - Keep care items and personal belongings within reach  - Initiate and maintain comfort rounds  - Apply yellow socks and bracelet for high fall risk patients  - Consider moving patient to room near nurses station  8/7/2023 1204 by Opal Tomas RN  Outcome: Completed  8/7/2023 0927 by Opal Tomas RN  Outcome: Adequate for Discharge  Goal: Maintain or return to baseline ADL function  Description: INTERVENTIONS:  -  Assess patient's ability to carry out ADLs; assess patient's baseline for ADL function and identify physical deficits which impact ability to perform ADLs (bathing, care of mouth/teeth, toileting, grooming, dressing, etc.)  - Assess/evaluate cause of self-care deficits   - Assess range of motion  - Assess patient's mobility; develop plan if impaired  - Assess patient's need for assistive devices and provide as appropriate  - Encourage maximum independence but intervene and supervise when necessary  - Involve family in performance of ADLs  - Assess for home care needs following discharge   - Consider OT consult to assist with ADL evaluation and planning for discharge  - Provide patient education as appropriate  8/7/2023 1204 by Opal Tomas RN  Outcome: Completed  8/7/2023 0927 by Opal Tomas RN  Outcome: Adequate for Discharge  Goal: Maintains/Returns to pre admission functional level  Description: INTERVENTIONS:  - Perform BMAT or MOVE assessment daily.   - Set and communicate daily mobility goal to care team and patient/family/caregiver.    - Record patient progress and toleration of activity level   8/7/2023 1204 by Opal Tomas RN  Outcome: Completed  8/7/2023 0927 by Opal Tomas RN  Outcome: Adequate for Discharge     Problem: DISCHARGE PLANNING  Goal: Discharge to home or other facility with appropriate resources  Description: INTERVENTIONS:  - Identify barriers to discharge w/patient and caregiver  - Arrange for needed discharge resources and transportation as appropriate  - Identify discharge learning needs (meds, wound care, etc.)  - Arrange for interpretive services to assist at discharge as needed  - Refer to Case Management Department for coordinating discharge planning if the patient needs post-hospital services based on physician/advanced practitioner order or complex needs related to functional status, cognitive ability, or social support system  2023 by Elisa Rivers RN  Outcome: Completed  2023 by Elisa Rivers RN  Outcome: Adequate for Discharge     Problem: POSTPARTUM  Goal: Experiences normal postpartum course  Description: INTERVENTIONS:  - Monitor maternal vital signs  - Assess uterine involution and lochia  2023 by Elisa Rivers RN  Outcome: Completed  2023 by Elisa Rivers RN  Outcome: Adequate for Discharge  Goal: Appropriate maternal -  bonding  Description: INTERVENTIONS:  - Identify family support  - Assess for appropriate maternal/infant bonding   -Encourage maternal/infant bonding opportunities  - Referral to  or  as needed  2023 by Elisa Rivers RN  Outcome: Completed  2023 by Elisa Rivers RN  Outcome: Adequate for Discharge  Goal: Establishment of infant feeding pattern  Description: INTERVENTIONS:  - Assess breast/bottle feeding  - Refer to lactation as needed  2023 by Elisa Rivers RN  Outcome: Completed  2023 by Elisa Rievrs RN  Outcome: Adequate for Discharge  Goal: Incision(s), wounds(s) or drain site(s) healing without S/S of infection  Description: INTERVENTIONS  - Assess and document dressing, incision, wound bed, drain sites and surrounding tissue  - Provide patient and family education  2023 by Elisa Rivers RN  Outcome: Completed  2023 by Elisa Rivers RN  Outcome: Adequate for Discharge

## 2023-08-10 LAB — PLACENTA IN STORAGE: NORMAL

## 2023-08-15 ENCOUNTER — POSTPARTUM VISIT (OUTPATIENT)
Age: 33
End: 2023-08-15

## 2023-08-15 VITALS — WEIGHT: 187 LBS | SYSTOLIC BLOOD PRESSURE: 104 MMHG | BODY MASS INDEX: 34.2 KG/M2 | DIASTOLIC BLOOD PRESSURE: 62 MMHG

## 2023-08-15 DIAGNOSIS — Z98.891 STATUS POST PRIMARY LOW TRANSVERSE CESAREAN SECTION: ICD-10-CM

## 2023-08-15 PROCEDURE — 99024 POSTOP FOLLOW-UP VISIT: CPT | Performed by: OBSTETRICS & GYNECOLOGY

## 2023-08-16 NOTE — PROGRESS NOTES
OB POSTPARTUM VISIT PROGRESS NOTE  Date of Encounter: 2023    Carlos Candelaria    : 1990  (27 y.o.)  MR: [id number]    Rajan Allen is in for her postpartum visit. She is now . She delivered by primary  section. She's generally doing well, denies current pain or bleeding issues, and has no significant depression issues. She is bottle feeding. She denies any issues with wound. Objective   EXAM:    VITALS: Blood pressure 104/62, weight 84.8 kg (187 lb), last menstrual period 2022, not currently breastfeeding. BMI: Body mass index is 34.2 kg/m². Physical Exam  Constitutional:       Appearance: Normal appearance. HENT:      Head: Normocephalic. Cardiovascular:      Rate and Rhythm: Normal rate and regular rhythm. Pulmonary:      Effort: Pulmonary effort is normal.   Abdominal:      Tenderness: There is no abdominal tenderness. Comments: Wound - C/D/I; steristrips removed   Musculoskeletal:         General: No swelling. Neurological:      General: No focal deficit present. Mental Status: She is alert and oriented to person, place, and time. Skin:     General: Skin is warm and dry. Psychiatric:         Mood and Affect: Mood normal.         Behavior: Behavior normal.   Vitals reviewed.        PDS 2      Assessment/Plan   Diagnoses and all orders for this visit:    Postpartum care and examination    Status post primary low transverse  section    RTO 2 weeks for wound recheck and discussion re: contraception    Rimma Bales MD

## 2023-08-24 DIAGNOSIS — B37.2 YEAST DERMATITIS: Primary | ICD-10-CM

## 2023-08-24 RX ORDER — FLUCONAZOLE 200 MG/1
200 TABLET ORAL DAILY
Qty: 7 TABLET | Refills: 0 | Status: SHIPPED | OUTPATIENT
Start: 2023-08-24 | End: 2023-08-31

## 2023-08-24 NOTE — TELEPHONE ENCOUNTER
----- Message from KEN GARZACENT BEH [facility] sent at 8/24/2023  3:05 PM EDT -----  Regarding: Incision pain  Contact: [phone]  Thank you! Is that being sent to [facility] on [address] [facility] [address] [facility]

## 2023-08-24 NOTE — TELEPHONE ENCOUNTER
----- Message from Kathy Dutta MD sent at 8/24/2023  2:40 PM EDT -----  Regarding: FW: Incision pain  Contact: [phone]  Tell her it looks like she has a yeast infection around wound. Send in Diflucan 200mg p.o. daily x 7 days. ----- Message -----  From: Danish Wilmer  Sent: 8/24/2023   2:11 PM EDT  To: Kathy Dutta MD  Subject: FW: Incision pain                                Pic enclosed  ----- Message -----  From: Bob Knox  Sent: 8/24/2023   2:00 PM EDT  To: [facility]  Subject: Incision pain                                    Hi Dr. Junnie Merlin,     I hadn’t had really any pain last week or much the week before and I am suddenly having a bit more pain at the incision site. I have no fever and I don’t see any puss leaking from the site. I have attached a picture of the incision. If this is all normal, not a problem I just wanted to make sure it was ok for the pain to go up this week.

## 2023-09-05 ENCOUNTER — POSTPARTUM VISIT (OUTPATIENT)
Age: 33
End: 2023-09-05

## 2023-09-05 VITALS
WEIGHT: 183.6 LBS | DIASTOLIC BLOOD PRESSURE: 74 MMHG | BODY MASS INDEX: 33.79 KG/M2 | SYSTOLIC BLOOD PRESSURE: 138 MMHG | HEIGHT: 62 IN

## 2023-09-05 DIAGNOSIS — Z30.011 ENCOUNTER FOR INITIAL PRESCRIPTION OF CONTRACEPTIVE PILLS: ICD-10-CM

## 2023-09-05 PROCEDURE — 99024 POSTOP FOLLOW-UP VISIT: CPT | Performed by: OBSTETRICS & GYNECOLOGY

## 2023-09-06 RX ORDER — DESOGESTREL AND ETHINYL ESTRADIOL 0.15-0.03
1 KIT ORAL DAILY
Qty: 84 TABLET | Refills: 3 | Status: SHIPPED | OUTPATIENT
Start: 2023-09-06

## 2023-09-06 NOTE — PROGRESS NOTES
OB POSTPARTUM VISIT PROGRESS NOTE  Date of Encounter: 2023    Hunter Wright    : 1990  (27 y.o.)  MR: [id number]    Leidy Jackson is in for her postpartum visit. She is now . She delivered by primary  section. She's generally doing well, denies current pain or bleeding issues, and has no significant depression issues. She is bottle feeding. We discussed all appropriate contraceptive options and she chooses OCP's. Objective   EXAM:    VITALS: Blood pressure 138/74, height 5' 2" (1.575 m), weight 83.3 kg (183 lb 9.6 oz), last menstrual period 2022, not currently breastfeeding. BMI: Body mass index is 33.58 kg/m². Physical Exam  Constitutional:       Appearance: Normal appearance. HENT:      Head: Normocephalic. Cardiovascular:      Rate and Rhythm: Normal rate and regular rhythm. Pulmonary:      Effort: Pulmonary effort is normal.   Abdominal:      Palpations: Abdomen is soft. Tenderness: There is no abdominal tenderness. Comments: Wound - C/D/I   Musculoskeletal:         General: No swelling. Neurological:      General: No focal deficit present. Mental Status: She is alert and oriented to person, place, and time. Skin:     General: Skin is warm and dry. Psychiatric:         Mood and Affect: Mood normal.         Behavior: Behavior normal.   Vitals reviewed. Assessment/Plan   Diagnoses and all orders for this visit:    Postpartum care and examination    Encounter for initial prescription of contraceptive pills  -     desogestrel-ethinyl estradiol (APRI) 0.15-30 MG-MCG per tablet;  Take 1 tablet by mouth daily    RTO 3 months for annual    Lorena Rm MD

## 2023-09-15 DIAGNOSIS — E10.40 TYPE 1 DIABETES MELLITUS WITH DIABETIC NEUROPATHY (HCC): ICD-10-CM

## 2023-09-15 RX ORDER — PROCHLORPERAZINE 25 MG/1
SUPPOSITORY RECTAL
Qty: 3 EACH | Refills: 12 | Status: SHIPPED | OUTPATIENT
Start: 2023-09-15

## 2023-10-24 LAB
ALBUMIN SERPL-MCNC: 3.8 G/DL (ref 3.9–4.9)
ALBUMIN/GLOB SERPL: 1.2 {RATIO} (ref 1.2–2.2)
ALP SERPL-CCNC: 51 IU/L (ref 44–121)
ALT SERPL-CCNC: 15 IU/L (ref 0–32)
AST SERPL-CCNC: 15 IU/L (ref 0–40)
BASOPHILS # BLD AUTO: 0.1 X10E3/UL (ref 0–0.2)
BASOPHILS NFR BLD AUTO: 1 %
BILIRUB SERPL-MCNC: 0.4 MG/DL (ref 0–1.2)
BUN SERPL-MCNC: 10 MG/DL (ref 6–20)
BUN/CREAT SERPL: 14 (ref 9–23)
CALCIUM SERPL-MCNC: 9.1 MG/DL (ref 8.7–10.2)
CHLORIDE SERPL-SCNC: 107 MMOL/L (ref 96–106)
CHOLEST SERPL-MCNC: 143 MG/DL (ref 100–199)
CHOLEST/HDLC SERPL: 3.3 RATIO (ref 0–4.4)
CO2 SERPL-SCNC: 24 MMOL/L (ref 20–29)
CREAT SERPL-MCNC: 0.71 MG/DL (ref 0.57–1)
EGFR: 114 ML/MIN/1.73
EOSINOPHIL # BLD AUTO: 0.5 X10E3/UL (ref 0–0.4)
EOSINOPHIL NFR BLD AUTO: 7 %
ERYTHROCYTE [DISTWIDTH] IN BLOOD BY AUTOMATED COUNT: 12.1 % (ref 11.7–15.4)
EST. AVERAGE GLUCOSE BLD GHB EST-MCNC: 97 MG/DL
GLOBULIN SER-MCNC: 3.1 G/DL (ref 1.5–4.5)
GLUCOSE SERPL-MCNC: 72 MG/DL (ref 70–99)
HBA1C MFR BLD: 5 % (ref 4.8–5.6)
HCT VFR BLD AUTO: 38 % (ref 34–46.6)
HDLC SERPL-MCNC: 43 MG/DL
HGB BLD-MCNC: 12.5 G/DL (ref 11.1–15.9)
IMM GRANULOCYTES # BLD: 0 X10E3/UL (ref 0–0.1)
IMM GRANULOCYTES NFR BLD: 0 %
LDLC SERPL CALC-MCNC: 82 MG/DL (ref 0–99)
LYMPHOCYTES # BLD AUTO: 2.7 X10E3/UL (ref 0.7–3.1)
LYMPHOCYTES NFR BLD AUTO: 41 %
MCH RBC QN AUTO: 28.4 PG (ref 26.6–33)
MCHC RBC AUTO-ENTMCNC: 32.9 G/DL (ref 31.5–35.7)
MCV RBC AUTO: 86 FL (ref 79–97)
MONOCYTES # BLD AUTO: 0.5 X10E3/UL (ref 0.1–0.9)
MONOCYTES NFR BLD AUTO: 7 %
NEUTROPHILS # BLD AUTO: 2.9 X10E3/UL (ref 1.4–7)
NEUTROPHILS NFR BLD AUTO: 44 %
PLATELET # BLD AUTO: 255 X10E3/UL (ref 150–450)
POTASSIUM SERPL-SCNC: 4.1 MMOL/L (ref 3.5–5.2)
PROT SERPL-MCNC: 6.9 G/DL (ref 6–8.5)
RBC # BLD AUTO: 4.4 X10E6/UL (ref 3.77–5.28)
SL AMB VLDL CHOLESTEROL CALC: 18 MG/DL (ref 5–40)
SODIUM SERPL-SCNC: 142 MMOL/L (ref 134–144)
T4 FREE SERPL-MCNC: 1.81 NG/DL (ref 0.82–1.77)
TRIGL SERPL-MCNC: 95 MG/DL (ref 0–149)
TSH SERPL DL<=0.005 MIU/L-ACNC: 0.01 UIU/ML (ref 0.45–4.5)
WBC # BLD AUTO: 6.6 X10E3/UL (ref 3.4–10.8)

## 2023-10-25 ENCOUNTER — OFFICE VISIT (OUTPATIENT)
Dept: ENDOCRINOLOGY | Facility: HOSPITAL | Age: 33
End: 2023-10-25
Payer: COMMERCIAL

## 2023-10-25 VITALS
SYSTOLIC BLOOD PRESSURE: 122 MMHG | HEART RATE: 99 BPM | BODY MASS INDEX: 33.86 KG/M2 | WEIGHT: 184 LBS | DIASTOLIC BLOOD PRESSURE: 92 MMHG | HEIGHT: 62 IN

## 2023-10-25 DIAGNOSIS — E10.3212 MILD NONPROLIFERATIVE DIABETIC RETINOPATHY OF LEFT EYE WITH MACULAR EDEMA ASSOCIATED WITH TYPE 1 DIABETES MELLITUS (HCC): ICD-10-CM

## 2023-10-25 DIAGNOSIS — E03.9 HYPOTHYROIDISM AFFECTING PREGNANCY IN SECOND TRIMESTER: ICD-10-CM

## 2023-10-25 DIAGNOSIS — E10.40 TYPE 1 DIABETES MELLITUS WITH DIABETIC NEUROPATHY (HCC): ICD-10-CM

## 2023-10-25 DIAGNOSIS — E03.9 ACQUIRED HYPOTHYROIDISM: ICD-10-CM

## 2023-10-25 DIAGNOSIS — E10.9 TYPE 1 DIABETES MELLITUS WITHOUT COMPLICATION (HCC): ICD-10-CM

## 2023-10-25 DIAGNOSIS — O99.282 HYPOTHYROIDISM AFFECTING PREGNANCY IN SECOND TRIMESTER: ICD-10-CM

## 2023-10-25 PROCEDURE — 95251 CONT GLUC MNTR ANALYSIS I&R: CPT | Performed by: NURSE PRACTITIONER

## 2023-10-25 PROCEDURE — 99214 OFFICE O/P EST MOD 30 MIN: CPT | Performed by: NURSE PRACTITIONER

## 2023-10-25 NOTE — PROGRESS NOTES
Collins Farrell 26 y.o. female MRN: [id number]    Encounter: [id number]      Assessment/Plan     Assessment: This is a 26y.o.-year-old female with type 1 diabetes and hypothyroidism. Plan:  1. Hypothyroidism. Most recent thyroid function studies are suppressed with a TSH of 0.007. For now, she will continue levothyroxine 150 mcg - 1 tablet daily for 5 days of the week with 1 tablet on Saturday or Sunday. Check TSH and free T4 in 6 to 8 weeks to reassess and prior to next visit. 2.  Type 1 diabetes. Recent hemoglobin A1c is 5.0. She is having some low blood sugars during the day as evidenced on her Dexcom continuous glucose monitor. I have adjusted her basal rate at noon to 0.65 and at 3:30 PM and 5:15 PM to 0.60. She will continue to utilize the pump and sensor and send a report to the office in 2 weeks for review. Hemoglobin A1c prior to next visit. CC: Type I diabetes/hypothyroidism follow-up    History of Present Illness     HPI:  26y.o. year old female with history of hypothyroidism and type 1 diabetes with retinopathy for follow-up visit. She has hypothyroidism and takes levothyroxine 150 mcg 1 tablet - daily. She is currently 11 weeks postpartum. her most resent TSH from October 23 , 2023 is 0.007 with a Free T4 of 1.81. She has type 1 diabetes diagnosed 32 years ago. She is currently pregnant on an insulin pump utilizing a t:slim to insulin pump with Dexcom G6 continuous glucose monitoring system integrated with her insulin pump. Recent hemoglobin A1c from October 23, 2023 is 5.0. Last diabetic eye exam was in summer 2022  and last diabetic foot exam was April 2022. She utilizes the Tandem T-Slim X 2 insulin pump with Dexcom G6 continuous glucose monitoring system integrated with her insulin pump. She is currently not in control IQ.   Basal rates:  12 AM to 1 AM 0.35 units/h, 1 AM to 3 AM 0.7 units/h, 3 AM to 6 AM 1 unit/h, 6 AM to 9:15 AM 0.9 units/h, 9:15 AM to 11:45 AM 1 unit/h, 11:45 AM to 3:30 PM 0.7 units/h, 3:30 PM to 5:30 PM 0.85 units/h, 5:30 PM to 7:30 PM 0.88 units/h, 7:30 PM to 10 PM 1.1 units/h, 10 PM to 12 AM 0.65 units/h. Bolus rates:  12 AM to 6 AM 1 unit per 5 g carbohydrate, 6 AM to 11:45 AM 1 unit per 4 g carbohydrate, 11:45 AM to 5:30 PM 1 unit per 5 g carbohydrate, 5:30 PM to 7:30 PM 1 unit per 4.5 g carbohydrate, 7:30 PM to 12 AM 1 unit per 4 g carbohydrate. Insulin sensitivity factor:   12 AM to 1 AM 1 unit per 40 blood sugar points for target blood glucose of 100, 1 AM to 6 AM 1 unit per 45 blood sugar points for target blood glucose of 95, 6 AM to 5:30 PM 1 unit per 40 blood sugar points for target blood glucose of 100,  5:30 PM to 7:30 PM 1 unit per 37 blood sugar points for target blood glucose of 100, and 7:30 PM to 12 AM 1 unit per 45 blood sugar points for target blood glucose of 100. Insulin action:  4.3 hours. Dexcom G6 continuous glucose monitoring system download was reviewed in the office from October 12 through October 25, 2023. CGM is active 93% of the time. Average glucose 120 mg/dL with standard deviation of 45 mg/dL. 82% of blood sugars are in target range, 8% high, 2% very high, 7 % low, 1% very low. Having some lower blood sugars throughout the day. Review of Systems   Constitutional:  Positive for fatigue. Negative for chills and fever. HENT: Negative. Negative for trouble swallowing and voice change. Eyes: Negative. Negative for photophobia, pain, discharge, redness, itching and visual disturbance. Respiratory: Negative. Negative for chest tightness and shortness of breath. Cardiovascular: Negative. Negative for chest pain. Gastrointestinal: Negative. Negative for abdominal pain, constipation, diarrhea and vomiting. Endocrine: Negative for cold intolerance, heat intolerance, polydipsia, polyphagia and polyuria. Genitourinary: Negative. Musculoskeletal: Negative. Skin: Negative. Allergic/Immunologic: Negative. Neurological: Negative. Negative for dizziness, syncope, light-headedness and headaches. Hematological: Negative. Psychiatric/Behavioral: Negative. All other systems reviewed and are negative. Historical Information   Past Medical History:   Diagnosis Date    Anxiety     Diabetes mellitus ([address])     type 1; since age 1; on dexcom (since summer 2022) and Tandem T-slim (<2mos) with excellent trend down in A1 (5.5!); had rough go in college years (did not take care of her diabetes); follows with LAKESHA Dhaliwal. Elective procedure for unacceptable cosmetic appearance     smart lipo    Hypothyroidism      Past Surgical History:   Procedure Laterality Date    HAND SURGERY      DC  DELIVERY ONLY N/A 2023    Procedure:  SECTION ();   Surgeon: Shadi Rowe MD;  Location: [facility] ;  Service: Obstetrics     Social History   Social History     Substance and Sexual Activity   Alcohol Use Not Currently    Comment: I have not had a drink in about 1 year     Social History     Substance and Sexual Activity   Drug Use No     Social History     Tobacco Use   Smoking Status Never   Smokeless Tobacco Never     Family History:   Family History   Problem Relation Age of Onset    Asthma Mother     Hypertension Mother     No Known Problems Father     Asthma Sister     No Known Problems Sister     No Known Problems Maternal Grandmother     No Known Problems Maternal Grandfather     No Known Problems Paternal Grandmother     No Known Problems Paternal Grandfather        Meds/Allergies   Current Outpatient Medications   Medication Sig Dispense Refill    albuterol (2.5 mg/3 mL) 0.083 % nebulizer solution Take 3 mL (2.5 mg total) by nebulization once as needed for shortness of breath for up to 1 dose  0    Continuous Blood Gluc  (Dexcom G6 ) TONA Use to monitor blood sugar 1 each 0    Continuous Blood Gluc Sensor (Dexcom G6 Sensor) MISC CHANGE EVERY 10 DAYS 3 each 12    Continuous Blood Gluc Transmit (Dexcom G6 Transmitter) MISC Change every 90 days 1 each 3    desogestrel-ethinyl estradiol (APRI) 0.15-30 MG-MCG per tablet Take 1 tablet by mouth daily 84 tablet 3    HumaLOG 100 UNIT/ML injection USE VIA INSULIN PUMP UP  UNITS DAILY 30 mL 6    insulin lispro (HumaLOG) 100 units/mL 100 Units by Subcutaneous Insulin Pump route daily as needed (pt manages)  0    levothyroxine 150 mcg tablet TAKE ONE TABLET BY MOUTH EVERY DAY 30 tablet 11    valACYclovir (VALTREX) 500 mg tablet Take 1 tablet (500 mg total) by mouth 2 (two) times a day 180 tablet 0     No current facility-administered medications for this visit. Allergies   Allergen Reactions    Sulfamethoxazole      Other reaction(s): severe nausea/vomiting (03/13/2017)    Trimethoprim      Other reaction(s): severe nausea/vomiting (03/13/2017)       Objective   Vitals: Blood pressure 122/92, pulse 99, height 5' 2" (1.575 m), weight 83.5 kg (184 lb), not currently breastfeeding. Physical Exam  Vitals reviewed. Constitutional:       Appearance: She is well-developed. HENT:      Head: Normocephalic and atraumatic. Eyes:      Conjunctiva/sclera: Conjunctivae normal.      Pupils: Pupils are equal, round, and reactive to light. Cardiovascular:      Rate and Rhythm: Normal rate and regular rhythm. Heart sounds: Normal heart sounds. Pulmonary:      Effort: Pulmonary effort is normal.      Breath sounds: Normal breath sounds. Abdominal:      General: Bowel sounds are normal.      Palpations: Abdomen is soft. Musculoskeletal:         General: Normal range of motion. Cervical back: Normal range of motion and neck supple. Skin:     General: Skin is warm and dry. Neurological:      Mental Status: She is alert and oriented to person, place, and time. Psychiatric:         Behavior: Behavior normal.         Thought Content:  Thought content normal.         Judgment: Judgment normal.         Lab Results:   Lab Results   Component Value Date/Time    Hemoglobin A1C 5.0 10/23/2023 09:40 AM    Hemoglobin A1C 5.0 06/22/2023 09:26 AM    Hemoglobin A1C 4.9 04/27/2023 11:30 AM    WBC 18.84 (H) 08/05/2023 05:10 AM    WBC 13.54 (H) 08/02/2023 05:54 PM    White Blood Cell Count 6.6 10/23/2023 09:40 AM    White Blood Cell Count 15.9 (H) 06/22/2023 09:22 AM    White Blood Cell Count 13.2 (H) 05/30/2023 09:52 AM    Hemoglobin 12.5 10/23/2023 09:40 AM    Hemoglobin 10.2 (L) 08/05/2023 05:10 AM    Hemoglobin 12.5 08/02/2023 05:54 PM    Hemoglobin 12.3 06/22/2023 09:22 AM    Hemoglobin 11.7 05/30/2023 09:52 AM    HCT 38.0 10/23/2023 09:40 AM    HCT 35.8 06/22/2023 09:22 AM    HCT 33.6 (L) 05/30/2023 09:52 AM    Hematocrit 30.1 (L) 08/05/2023 05:10 AM    Hematocrit 35.9 08/02/2023 05:54 PM    MCV 86 10/23/2023 09:40 AM    MCV 90 08/05/2023 05:10 AM    MCV 88 08/02/2023 05:54 PM    MCV 92 06/22/2023 09:22 AM    MCV 90 05/30/2023 09:52 AM    Platelet Count 058 39/85/1427 09:40 AM    Platelet Count 738 37/29/6105 09:22 AM    Platelet Count 736 55/75/7685 09:52 AM    Platelets 925 03/40/6403 05:10 AM    Platelets 633 19/99/9441 05:54 PM    BUN 10 10/23/2023 09:40 AM    BUN 11 08/02/2023 05:54 PM    BUN 10 06/22/2023 09:22 AM    BUN 11 12/21/2022 10:35 AM    Potassium 4.1 10/23/2023 09:40 AM    Potassium 3.8 08/02/2023 05:54 PM    Potassium 4.1 06/22/2023 09:22 AM    Potassium 3.9 12/21/2022 10:35 AM    Chloride 107 (H) 10/23/2023 09:40 AM    Chloride 104 08/02/2023 05:54 PM    Chloride 103 06/22/2023 09:22 AM    Chloride 102 12/21/2022 10:35 AM    CO2 24 10/23/2023 09:40 AM    CO2 23 08/02/2023 05:54 PM    CO2 23 06/22/2023 09:22 AM    CO2 23 12/21/2022 10:35 AM    Creatinine 0.71 10/23/2023 09:40 AM    Creatinine 0.59 (L) 08/02/2023 05:54 PM    Creatinine 0.69 06/22/2023 09:22 AM    Creatinine 0.86 12/21/2022 10:35 AM    AST 15 10/23/2023 09:40 AM    AST 15 08/02/2023 05:54 PM    AST 17 06/22/2023 09:22 AM    AST 17 12/21/2022 10:35 AM    ALT 15 10/23/2023 09:40 AM    ALT 9 08/02/2023 05:54 PM    ALT 8 06/22/2023 09:22 AM    ALT 8 12/21/2022 10:35 AM    Total Protein 6.9 08/02/2023 05:54 PM    Protein, Total 6.9 10/23/2023 09:40 AM    Protein, Total 6.7 06/22/2023 09:22 AM    Protein, Total 6.8 12/21/2022 10:35 AM    Albumin 3.8 (L) 10/23/2023 09:40 AM    Albumin 3.2 (L) 08/02/2023 05:54 PM    Albumin 3.3 (L) 06/22/2023 09:22 AM    Albumin 3.8 12/21/2022 10:35 AM    Globulin, Total 3.1 10/23/2023 09:40 AM    Globulin, Total 3.4 06/22/2023 09:22 AM    Globulin, Total 3.0 12/21/2022 10:35 AM    HDL 43 10/23/2023 09:40 AM    HDL 50 12/21/2022 10:35 AM    Triglycerides 95 10/23/2023 09:40 AM    Triglycerides 62 12/21/2022 10:35 AM       Portions of the record may have been created with voice recognition software. Occasional wrong word or "sound a like" substitutions may have occurred due to the inherent limitations of voice recognition software. Read the chart carefully and recognize, using context, where substitutions have occurred.

## 2023-10-25 NOTE — PATIENT INSTRUCTIONS
Be mindful of diet. Stay active and stay hydrated. We made a small change to basal rates to help with mild hypoglycemia during the day. Please discuss recent mild low blood sugars. Continue the DEX COM. KEEP UP THE GREAT WORK !!!     Take levothyroxine 150 - 5 days a week with no tablet 2 days a week. Take your levothyroxine consistently, in the morning, on an empty stomach with only water and wait 30 min before eating and 4 hr before taking multivitamins or supplements. Recheck TSH and free T4 every in 6-8 weeks to reassess. Obtain lab work prior to next visit.

## 2023-11-10 DIAGNOSIS — E03.9 ACQUIRED HYPOTHYROIDISM: ICD-10-CM

## 2023-11-10 RX ORDER — LEVOTHYROXINE SODIUM 0.15 MG/1
150 TABLET ORAL DAILY
Qty: 30 TABLET | Refills: 11 | Status: SHIPPED | OUTPATIENT
Start: 2023-11-10

## 2023-11-24 DIAGNOSIS — E10.40 TYPE 1 DIABETES MELLITUS WITH DIABETIC NEUROPATHY (HCC): ICD-10-CM

## 2023-11-28 RX ORDER — PROCHLORPERAZINE 25 MG/1
SUPPOSITORY RECTAL
Qty: 3 EACH | Refills: 12 | Status: SHIPPED | OUTPATIENT
Start: 2023-11-28

## 2023-11-29 DIAGNOSIS — Z30.011 ENCOUNTER FOR INITIAL PRESCRIPTION OF CONTRACEPTIVE PILLS: ICD-10-CM

## 2023-11-29 RX ORDER — DESOGESTREL AND ETHINYL ESTRADIOL 0.15-0.03
1 KIT ORAL DAILY
Qty: 84 TABLET | Refills: 4 | Status: SHIPPED | OUTPATIENT
Start: 2023-11-29

## 2023-12-08 ENCOUNTER — ANNUAL EXAM (OUTPATIENT)
Age: 33
End: 2023-12-08
Payer: COMMERCIAL

## 2023-12-08 VITALS
HEIGHT: 62 IN | DIASTOLIC BLOOD PRESSURE: 54 MMHG | WEIGHT: 183.2 LBS | SYSTOLIC BLOOD PRESSURE: 102 MMHG | BODY MASS INDEX: 33.71 KG/M2

## 2023-12-08 DIAGNOSIS — Z01.419 ENCOUNTER FOR ANNUAL ROUTINE GYNECOLOGICAL EXAMINATION: Primary | ICD-10-CM

## 2023-12-08 DIAGNOSIS — Z11.51 SCREENING FOR HUMAN PAPILLOMAVIRUS (HPV): ICD-10-CM

## 2023-12-08 DIAGNOSIS — B37.31 YEAST VAGINITIS: ICD-10-CM

## 2023-12-08 PROBLEM — O99.283 HYPOTHYROIDISM DURING PREGNANCY IN THIRD TRIMESTER: Status: RESOLVED | Noted: 2023-02-09 | Resolved: 2023-12-08

## 2023-12-08 PROBLEM — O24.013: Status: RESOLVED | Noted: 2023-01-30 | Resolved: 2023-12-08

## 2023-12-08 PROBLEM — F41.9 ANXIETY DURING PREGNANCY IN THIRD TRIMESTER, ANTEPARTUM: Status: RESOLVED | Noted: 2023-06-21 | Resolved: 2023-12-08

## 2023-12-08 PROBLEM — O99.213 MATERNAL OBESITY, ANTEPARTUM, THIRD TRIMESTER: Status: RESOLVED | Noted: 2023-04-10 | Resolved: 2023-12-08

## 2023-12-08 PROBLEM — O13.3 GESTATIONAL HYPERTENSION, THIRD TRIMESTER: Status: RESOLVED | Noted: 2023-06-21 | Resolved: 2023-12-08

## 2023-12-08 PROBLEM — E03.9 HYPOTHYROIDISM DURING PREGNANCY IN THIRD TRIMESTER: Status: RESOLVED | Noted: 2023-02-09 | Resolved: 2023-12-08

## 2023-12-08 PROBLEM — O99.343 ANXIETY DURING PREGNANCY IN THIRD TRIMESTER, ANTEPARTUM: Status: RESOLVED | Noted: 2023-06-21 | Resolved: 2023-12-08

## 2023-12-08 PROBLEM — E10.319: Status: RESOLVED | Noted: 2023-01-30 | Resolved: 2023-12-08

## 2023-12-08 PROBLEM — O43.193 MARGINAL INSERTION OF UMBILICAL CORD AFFECTING MANAGEMENT OF MOTHER IN THIRD TRIMESTER: Status: RESOLVED | Noted: 2023-04-10 | Resolved: 2023-12-08

## 2023-12-08 PROCEDURE — 99395 PREV VISIT EST AGE 18-39: CPT | Performed by: OBSTETRICS & GYNECOLOGY

## 2023-12-08 RX ORDER — FLUCONAZOLE 150 MG/1
150 TABLET ORAL EVERY OTHER DAY
Qty: 2 TABLET | Refills: 3 | Status: SHIPPED | OUTPATIENT
Start: 2023-12-08 | End: 2023-12-11

## 2023-12-08 RX ORDER — AMOXICILLIN AND CLAVULANATE POTASSIUM 875; 125 MG/1; MG/1
TABLET, FILM COATED ORAL
COMMUNITY
Start: 2023-11-27

## 2023-12-08 NOTE — PROGRESS NOTES
Assessment/Plan:    1. Encounter for annual routine gynecological examination    - Liquid-based pap, screening    2. Screening for human papillomavirus (HPV)    - Liquid-based pap, screening    3. Yeast vaginitis    - fluconazole (DIFLUCAN) 150 mg tablet; Take 1 tablet (150 mg total) by mouth every other day for 2 doses  Dispense: 2 tablet; Refill: 3      Subjective      Soha Farrell is a 26 y.o. female who presents for annual exam. Periods are regular. She thinks she has a yeast infection currently. She denies any breast, urinary or sexual concerns. Current contraception: OCP (estrogen/progesterone)  History of abnormal Pap smear: no  Regular self breast exam: yes  History of abnormal mammogram: no  History of abnormal lipids: no    Menstrual History:  OB History          1    Para   1    Term   1       0    AB   0    Living   1         SAB   0    IAB   0    Ectopic   0    Multiple   0    Live Births   1                Patient's last menstrual period was 2023 (exact date). Past Medical History:   Diagnosis Date    Anxiety     Diabetes mellitus ([address] [facility])     type 1; since age 1; on dexcom (since summer 2022) and Tandem T-slim (<2mos) with excellent trend down in A1 (5.5!); had rough go in college years (did not take care of her diabetes); follows with LAKESHA Dhaliwal.     Elective procedure for unacceptable cosmetic appearance     smart lipo    Hypothyroidism 2012       Family History   Problem Relation Age of Onset    Asthma Mother     Hypertension Mother     No Known Problems Father     Asthma Sister     No Known Problems Sister     No Known Problems Maternal Grandmother     No Known Problems Maternal Grandfather     No Known Problems Paternal Grandmother     No Known Problems Paternal Grandfather        The following portions of the patient's history were reviewed and updated as appropriate: allergies, current medications, past family history, past medical history, past social history, past surgical history, and problem list.    Review of Systems  Pertinent items are noted in HPI. Objective      /54 (BP Location: Right arm, Patient Position: Sitting, Cuff Size: Standard)   Ht 5' 2" (1.575 m)   Wt 83.1 kg (183 lb 3.2 oz)   LMP 11/14/2023 (Exact Date)   BMI 33.51 kg/m²     General:   alert and oriented, in no acute distress   Heart:  Breasts: regular rate and rhythm  appear normal, no suspicious masses, no skin or nipple changes or axillary nodes.    Lungs: Effort normal   Abdomen: soft, non-tender, without masses or organomegaly   Vulva: Erythema and edema with satellite lesions to groin folds   Vagina: normal mucosa, normal discharge   Cervix: no lesions   Uterus: normal size, mobile, non-tender   Adnexa: normal adnexa and no mass, fullness, tenderness

## 2023-12-23 DIAGNOSIS — E10.40 TYPE 1 DIABETES MELLITUS WITH DIABETIC NEUROPATHY (HCC): ICD-10-CM

## 2023-12-26 RX ORDER — PROCHLORPERAZINE 25 MG/1
SUPPOSITORY RECTAL
Qty: 9 EACH | Refills: 5 | Status: SHIPPED | OUTPATIENT
Start: 2023-12-26

## 2024-01-16 LAB
LEFT EYE DIABETIC RETINOPATHY: POSITIVE
RIGHT EYE DIABETIC RETINOPATHY: POSITIVE
SEVERITY (EYE EXAM): NORMAL

## 2024-01-31 ENCOUNTER — OFFICE VISIT (OUTPATIENT)
Dept: ENDOCRINOLOGY | Facility: HOSPITAL | Age: 34
End: 2024-01-31
Payer: COMMERCIAL

## 2024-01-31 VITALS
HEIGHT: 62 IN | WEIGHT: 180.4 LBS | BODY MASS INDEX: 33.2 KG/M2 | SYSTOLIC BLOOD PRESSURE: 112 MMHG | DIASTOLIC BLOOD PRESSURE: 74 MMHG | HEART RATE: 90 BPM

## 2024-01-31 DIAGNOSIS — E03.9 HYPOTHYROIDISM, UNSPECIFIED TYPE: ICD-10-CM

## 2024-01-31 DIAGNOSIS — E10.3212 MILD NONPROLIFERATIVE DIABETIC RETINOPATHY OF LEFT EYE WITH MACULAR EDEMA ASSOCIATED WITH TYPE 1 DIABETES MELLITUS (HCC): ICD-10-CM

## 2024-01-31 DIAGNOSIS — E10.3493: Primary | ICD-10-CM

## 2024-01-31 DIAGNOSIS — E10.3291 MILD NONPROLIFERATIVE DIABETIC RETINOPATHY OF RIGHT EYE WITHOUT MACULAR EDEMA ASSOCIATED WITH TYPE 1 DIABETES MELLITUS (HCC): ICD-10-CM

## 2024-01-31 LAB — SL AMB POCT HEMOGLOBIN AIC: 5.2 (ref ?–6.5)

## 2024-01-31 PROCEDURE — 99214 OFFICE O/P EST MOD 30 MIN: CPT | Performed by: NURSE PRACTITIONER

## 2024-01-31 PROCEDURE — 83036 HEMOGLOBIN GLYCOSYLATED A1C: CPT | Performed by: NURSE PRACTITIONER

## 2024-01-31 PROCEDURE — 95251 CONT GLUC MNTR ANALYSIS I&R: CPT | Performed by: NURSE PRACTITIONER

## 2024-01-31 NOTE — PATIENT INSTRUCTIONS
Be mindful of diet.     Stay active and stay hydrated.     Continue the DEX COM.     We did not make any pump changes at this time...    KEEP UP THE GREAT WORK !!!     Take levothyroxine 150 - 5 days a week with no tablet 2 days a week.     Take your levothyroxine consistently, in the morning, on an empty stomach with only water and wait 30 min before eating and 4 hr before taking multivitamins or supplements.     Recheck TSH and free T4 every in 6-8 weeks to reassess.     Obtain lab work prior to next visit.

## 2024-01-31 NOTE — PROGRESS NOTES
Fang Farrell 33 y.o. female MRN: [id number]    Encounter: [id number]      Assessment/Plan     Assessment:  This is a 33 y.o.-year-old female with type 1 diabetes and hypothyroidism.     Plan:  1.  Hypothyroidism.  Most recent TSH is in the normal range at 3.25.  For now, she will continue levothyroxine 150 mcg - 1 tablet daily for 5 days of the week with no tablet on Saturday or Sunday.  She will focus on consistency.  Check TSH and free T4 in 6 to 8 weeks to reassess and prior to next visit.      2.  Type 1 diabetes.  Recent hemoglobin A1c is 5.2.  Overall, she is relatively well-controlled when reviewing her Dexcom continuous glucose monitor.  For now, continue pump current settings.  She will continue to utilize the pump and sensor and send a report to the office in 2 weeks for review.  Hemoglobin A1c prior to next visit.    CC: Type 1 Diabetes/Hypothyroidism Follow Up    History of Present Illness     HPI:  33 y.o. year old female with history of hypothyroidism and type 1 diabetes with retinopathy for follow-up visit.     She has hypothyroidism and takes levothyroxine 150 mcg 1 tablet - daily. She is currently 6 months postpartum. her most resent TSH from January 30, 2024 is 3.250 with a Free T4 of 1.40.     She has type 1 diabetes diagnosed 33 years ago.  She is currently pregnant on an insulin pump utilizing a t:slim to insulin pump with Dexcom G6 continuous glucose monitoring system integrated with her insulin pump.  Recent hemoglobin A1c from January 31, 2024 is 5.2.  Last diabetic eye exam was in summer 2022  and last diabetic foot exam was April 2022.     She utilizes the Tandem T-Slim X 2 insulin pump with Dexcom G6 continuous glucose monitoring system integrated with her insulin pump.  She is currently not in control IQ.  Basal rates:  12 AM to 1 AM 0.35 units/h, 1 AM to 3 AM 0.7 units/h, 3 AM to 6 AM 1 unit/h, 6 AM to 9:15 AM 0.9 units/h, 9:15 AM to 11:45 AM 1 unit/h, 11:45 AM to 3:30 PM 0.7 units/h,  3:30 PM to 5:30 PM 0.85 units/h, 5:30 PM to 7:30 PM 0.88 units/h, 7:30 PM to 10 PM 1.1 units/h, 10 PM to 12 AM 0.65 units/h.  Bolus rates:  12 AM to 6 AM 1 unit per 5 g carbohydrate, 6 AM to 11:45 AM 1 unit per 4 g carbohydrate, 11:45 AM to 5:30 PM 1 unit per 5 g carbohydrate, 5:30 PM to 7:30 PM 1 unit per 4.5 g carbohydrate, 7:30 PM to 12 AM 1 unit per 4 g carbohydrate.  Insulin sensitivity factor:   12 AM to 1 AM 1 unit per 40 blood sugar points for target blood glucose of 100, 1 AM to 6 AM 1 unit per 45 blood sugar points for target blood glucose of 95, 6 AM to 5:30 PM 1 unit per 40 blood sugar points for target blood glucose of 100,  5:30 PM to 7:30 PM 1 unit per 37 blood sugar points for target blood glucose of 100, and 7:30 PM to 12 AM 1 unit per 45 blood sugar points for target blood glucose of 100.  Insulin action:  4.0 hours.          Review of Systems   Constitutional: Negative.  Negative for chills, fatigue and fever.   HENT: Negative.  Negative for trouble swallowing and voice change.    Eyes: Negative.  Negative for photophobia, pain, discharge, redness, itching and visual disturbance.   Respiratory: Negative.  Negative for chest tightness and shortness of breath.    Cardiovascular: Negative.  Negative for chest pain.   Gastrointestinal: Negative.  Negative for abdominal pain, constipation, diarrhea and vomiting.   Endocrine: Negative for cold intolerance, heat intolerance, polydipsia, polyphagia and polyuria.   Genitourinary: Negative.    Musculoskeletal: Negative.    Skin: Negative.    Allergic/Immunologic: Negative.    Neurological: Negative.  Negative for dizziness, syncope, light-headedness and headaches.   Hematological: Negative.    Psychiatric/Behavioral: Negative.     All other systems reviewed and are negative.      Historical Information   Past Medical History:   Diagnosis Date    Anxiety     Diabetes mellitus ([facility])     type 1; since age 2; on dexcom (since summer 2022) and Tandem T-slim  (<2mos) with excellent trend down in A1 (5.5!); had rough go in college years (did not take care of her diabetes); follows with LAKESHA Dhaliwal.    Elective procedure for unacceptable cosmetic appearance     smart lipo    Hypothyroidism      Past Surgical History:   Procedure Laterality Date    HAND SURGERY      OR  DELIVERY ONLY N/A 2023    Procedure:  SECTION ();  Surgeon: Marisol Ma MD;  Location: [facility] ;  Service: Obstetrics     Social History   Social History     Substance and Sexual Activity   Alcohol Use Not Currently    Comment: I have not had a drink in about 1 year     Social History     Substance and Sexual Activity   Drug Use No     Social History     Tobacco Use   Smoking Status Never   Smokeless Tobacco Never     Family History:   Family History   Problem Relation Age of Onset    Asthma Mother     Hypertension Mother     No Known Problems Father     Asthma Sister     No Known Problems Sister     No Known Problems Maternal Grandmother     No Known Problems Maternal Grandfather     No Known Problems Paternal Grandmother     No Known Problems Paternal Grandfather        Meds/Allergies   Current Outpatient Medications   Medication Sig Dispense Refill    Apri 0.15-30 MG-MCG per tablet TAKE 1 TABLET BY MOUTH EVERY DAY 84 tablet 4    Continuous Blood Gluc  (Dexcom G6 ) TONA Use to monitor blood sugar 1 each 0    Continuous Blood Gluc Sensor (Dexcom G6 Sensor) MISC CHANGE EVERY 10 DAYS 9 each 5    Continuous Blood Gluc Transmit (Dexcom G6 Transmitter) MISC Change every 90 days 1 each 3    HumaLOG 100 UNIT/ML injection USE VIA INSULIN PUMP UP  UNITS DAILY 30 mL 6    levothyroxine 150 mcg tablet Take 1 tablet (150 mcg total) by mouth daily 30 tablet 11     No current facility-administered medications for this visit.     Allergies   Allergen Reactions    Sulfamethoxazole      Other reaction(s): severe nausea/vomiting (2017)    Trimethoprim   "    Other reaction(s): severe nausea/vomiting (03/13/2017)       Objective   Vitals: Blood pressure 112/74, pulse 90, height 5' 2\" (1.575 m), weight 81.8 kg (180 lb 6.4 oz), not currently breastfeeding.    Physical Exam  Vitals reviewed.   Constitutional:       Appearance: She is well-developed.   HENT:      Head: Normocephalic and atraumatic.   Eyes:      Conjunctiva/sclera: Conjunctivae normal.      Pupils: Pupils are equal, round, and reactive to light.   Cardiovascular:      Rate and Rhythm: Normal rate and regular rhythm.      Pulses: no weak pulses          Dorsalis pedis pulses are 1+ on the right side and 1+ on the left side.        Posterior tibial pulses are 1+ on the right side and 1+ on the left side.      Heart sounds: Normal heart sounds.   Pulmonary:      Effort: Pulmonary effort is normal.      Breath sounds: Normal breath sounds.   Abdominal:      General: Bowel sounds are normal.      Palpations: Abdomen is soft.   Musculoskeletal:         General: Normal range of motion.      Cervical back: Normal range of motion and neck supple.   Feet:      Right foot:      Skin integrity: No ulcer, skin breakdown, erythema, warmth, callus or dry skin.      Left foot:      Skin integrity: No ulcer, skin breakdown, erythema, warmth, callus or dry skin.   Skin:     General: Skin is warm and dry.   Neurological:      Mental Status: She is alert and oriented to person, place, and time.   Psychiatric:         Behavior: Behavior normal.         Thought Content: Thought content normal.         Judgment: Judgment normal.       Patient's shoes and socks removed.    Right Foot/Ankle   Right Foot Inspection  Skin Exam: skin normal and skin intact. No dry skin, no warmth, no callus, no erythema, no maceration, no abnormal color, no pre-ulcer, no ulcer and no callus.     Toe Exam: ROM and strength within normal limits.     Sensory   Monofilament testing: intact    Vascular  Capillary refills: < 3 seconds  The right DP pulse " is 1+. The right PT pulse is 1+.     Left Foot/Ankle  Left Foot Inspection  Skin Exam: skin normal and skin intact. No dry skin, no warmth, no erythema, no maceration, normal color, no pre-ulcer, no ulcer and no callus.     Toe Exam: ROM and strength within normal limits.     Sensory   Monofilament testing: intact    Vascular  Capillary refills: < 3 seconds  The left DP pulse is 1+. The left PT pulse is 1+.     Assign Risk Category  No deformity present  No loss of protective sensation  No weak pulses  Risk: 0      Lab Results:   Lab Results   Component Value Date/Time    Hemoglobin A1C 5.0 10/23/2023 09:40 AM    Hemoglobin A1C 5.0 06/22/2023 09:26 AM    Hemoglobin A1C 4.9 04/27/2023 11:30 AM    WBC 18.84 (H) 08/05/2023 05:10 AM    WBC 13.54 (H) 08/02/2023 05:54 PM    White Blood Cell Count 6.6 10/23/2023 09:40 AM    White Blood Cell Count 15.9 (H) 06/22/2023 09:22 AM    White Blood Cell Count 13.2 (H) 05/30/2023 09:52 AM    Hemoglobin 12.5 10/23/2023 09:40 AM    Hemoglobin 10.2 (L) 08/05/2023 05:10 AM    Hemoglobin 12.5 08/02/2023 05:54 PM    Hemoglobin 12.3 06/22/2023 09:22 AM    Hemoglobin 11.7 05/30/2023 09:52 AM    Hematocrit 30.1 (L) 08/05/2023 05:10 AM    Hematocrit 35.9 08/02/2023 05:54 PM    HCT 38.0 10/23/2023 09:40 AM    HCT 35.8 06/22/2023 09:22 AM    HCT 33.6 (L) 05/30/2023 09:52 AM    MCV 86 10/23/2023 09:40 AM    MCV 90 08/05/2023 05:10 AM    MCV 88 08/02/2023 05:54 PM    MCV 92 06/22/2023 09:22 AM    MCV 90 05/30/2023 09:52 AM    Platelet Count 255 10/23/2023 09:40 AM    Platelet Count 230 06/22/2023 09:22 AM    Platelet Count 213 05/30/2023 09:52 AM    Platelets 188 08/05/2023 05:10 AM    Platelets 236 08/02/2023 05:54 PM    BUN 10 10/23/2023 09:40 AM    BUN 11 08/02/2023 05:54 PM    BUN 10 06/22/2023 09:22 AM    Potassium 4.1 10/23/2023 09:40 AM    Potassium 3.8 08/02/2023 05:54 PM    Potassium 4.1 06/22/2023 09:22 AM    Chloride 107 (H) 10/23/2023 09:40 AM    Chloride 104 08/02/2023 05:54 PM     "Chloride 103 06/22/2023 09:22 AM    CO2 24 10/23/2023 09:40 AM    CO2 23 08/02/2023 05:54 PM    CO2 23 06/22/2023 09:22 AM    Creatinine 0.71 10/23/2023 09:40 AM    Creatinine 0.59 (L) 08/02/2023 05:54 PM    Creatinine 0.69 06/22/2023 09:22 AM    AST 15 10/23/2023 09:40 AM    AST 15 08/02/2023 05:54 PM    AST 17 06/22/2023 09:22 AM    ALT 15 10/23/2023 09:40 AM    ALT 9 08/02/2023 05:54 PM    ALT 8 06/22/2023 09:22 AM    Total Protein 6.9 08/02/2023 05:54 PM    Protein, Total 6.9 10/23/2023 09:40 AM    Protein, Total 6.7 06/22/2023 09:22 AM    Albumin 3.8 (L) 10/23/2023 09:40 AM    Albumin 3.2 (L) 08/02/2023 05:54 PM    Albumin 3.3 (L) 06/22/2023 09:22 AM    Globulin, Total 3.1 10/23/2023 09:40 AM    Globulin, Total 3.4 06/22/2023 09:22 AM    HDL 43 10/23/2023 09:40 AM    Triglycerides 95 10/23/2023 09:40 AM     Portions of the record may have been created with voice recognition software. Occasional wrong word or \"sound a like\" substitutions may have occurred due to the inherent limitations of voice recognition software. Read the chart carefully and recognize, using context, where substitutions have occurred.    "

## 2024-02-01 ENCOUNTER — TELEPHONE (OUTPATIENT)
Dept: ADMINISTRATIVE | Facility: OTHER | Age: 34
End: 2024-02-01

## 2024-02-01 NOTE — LETTER
Diabetic Eye Exam Form    Date Requested: 24  Patient: Fang Farrell  Patient : 1990   Referring Provider: Marielle Quintana MD      DIABETIC Eye Exam Date _______________________________      Type of Exam MUST be documented for Diabetic Eye Exams. Please CHECK ONE.     Retinal Exam       Dilated Retinal Exam       OCT       Optomap-Iris Exam      Fundus Photography       Left Eye - Please check Retinopathy or No Retinopathy        Exam did show retinopathy    Exam did not show retinopathy       Right Eye - Please check Retinopathy or No Retinopathy       Exam did show retinopathy    Exam did not show retinopathy       Comments __________________________________________________________    Practice Providing Exam ______________________________________________    Exam Performed By (print name) _______________________________________      Provider Signature ___________________________________________________      These reports are needed for  compliance.  Please fax this completed form and a copy of the Diabetic Eye Exam report to our office located at [address] [place] [address] as soon as possible via Fax [phone] attention Bryanna: Phone [phone]  We thank you for your assistance in treating our mutual patient.

## 2024-02-01 NOTE — TELEPHONE ENCOUNTER
Upon review of the In Basket request and the patient's chart, initial outreach has been made via fax to facility. Please see Contacts section for details.     Thank you  Bryanna Rendon MA

## 2024-02-01 NOTE — TELEPHONE ENCOUNTER
----- Message from Eulalia Preciado sent at 1/31/2024  1:34 PM EST -----  Regarding: DM EYE EXAM  01/31/24 1:35 PM    John, our patient Fang Farrell has had a DM Eye Exam performed by Dr Jose Lilly at [facility]. Their number is [phone].    Thank you,  Eulalia Preciado  [facility] [place] [facility]

## 2024-02-01 NOTE — LETTER
Diabetic Eye Exam Form    Date Requested: 24  Patient: Fang Farrell  Patient : 1990   Referring Provider: Marielle Quintana MD      DIABETIC Eye Exam Date _______________________________      Type of Exam MUST be documented for Diabetic Eye Exams. Please CHECK ONE.     Retinal Exam       Dilated Retinal Exam       OCT       Optomap-Iris Exam      Fundus Photography       Left Eye - Please check Retinopathy or No Retinopathy        Exam did show retinopathy    Exam did not show retinopathy       Right Eye - Please check Retinopathy or No Retinopathy       Exam did show retinopathy    Exam did not show retinopathy       Comments __________________________________________________________    Practice Providing Exam ______________________________________________    Exam Performed By (print name) _______________________________________      Provider Signature ___________________________________________________      These reports are needed for  compliance.  Please fax this completed form and a copy of the Diabetic Eye Exam report to our office located at [address] [place] [address] as soon as possible via Fax [phone] attention Bryanna: Phone [phone]  We thank you for your assistance in treating our mutual patient.

## 2024-02-02 NOTE — TELEPHONE ENCOUNTER
Upon review of the In Basket request we were able to locate, review, and update the patient chart as requested for Diabetic Eye Exam.    Any additional questions or concerns should be emailed to the Practice Liaisons via the appropriate education email address, please do not reply via In Basket.    Thank you  Bryanna Rendon MA

## 2024-03-18 DIAGNOSIS — E10.40 TYPE 1 DIABETES MELLITUS WITH DIABETIC NEUROPATHY (HCC): ICD-10-CM

## 2024-03-19 RX ORDER — INSULIN LISPRO 100 [IU]/ML
INJECTION, SOLUTION INTRAVENOUS; SUBCUTANEOUS
Qty: 30 ML | Refills: 6 | Status: SHIPPED | OUTPATIENT
Start: 2024-03-19

## 2024-03-21 LAB
T4 FREE SERPL-MCNC: 1.88 NG/DL (ref 0.82–1.77)
TSH SERPL DL<=0.005 MIU/L-ACNC: 2.35 UIU/ML (ref 0.45–4.5)

## 2024-05-04 LAB
ALBUMIN SERPL-MCNC: 3.8 G/DL (ref 3.9–4.9)
ALBUMIN/GLOB SERPL: 1.2 {RATIO} (ref 1.2–2.2)
ALP SERPL-CCNC: 47 IU/L (ref 44–121)
ALT SERPL-CCNC: 13 IU/L (ref 0–32)
AST SERPL-CCNC: 16 IU/L (ref 0–40)
BASOPHILS # BLD AUTO: 0.1 X10E3/UL (ref 0–0.2)
BASOPHILS NFR BLD AUTO: 1 %
BILIRUB SERPL-MCNC: 0.5 MG/DL (ref 0–1.2)
BUN SERPL-MCNC: 11 MG/DL (ref 6–20)
BUN/CREAT SERPL: 12 (ref 9–23)
CALCIUM SERPL-MCNC: 9.1 MG/DL (ref 8.7–10.2)
CHLORIDE SERPL-SCNC: 102 MMOL/L (ref 96–106)
CHOLEST SERPL-MCNC: 216 MG/DL (ref 100–199)
CO2 SERPL-SCNC: 23 MMOL/L (ref 20–29)
CREAT SERPL-MCNC: 0.89 MG/DL (ref 0.57–1)
EGFR: 87 ML/MIN/1.73
EOSINOPHIL # BLD AUTO: 0.4 X10E3/UL (ref 0–0.4)
EOSINOPHIL NFR BLD AUTO: 6 %
ERYTHROCYTE [DISTWIDTH] IN BLOOD BY AUTOMATED COUNT: 11.8 % (ref 11.7–15.4)
GLOBULIN SER-MCNC: 3.3 G/DL (ref 1.5–4.5)
GLUCOSE SERPL-MCNC: 121 MG/DL (ref 70–99)
HBA1C MFR BLD: 5.5 % (ref 4.8–5.6)
HCT VFR BLD AUTO: 41.5 % (ref 34–46.6)
HDLC SERPL-MCNC: 65 MG/DL
HGB BLD-MCNC: 13.9 G/DL (ref 11.1–15.9)
IMM GRANULOCYTES # BLD: 0 X10E3/UL (ref 0–0.1)
IMM GRANULOCYTES NFR BLD: 0 %
LDLC SERPL CALC-MCNC: 139 MG/DL (ref 0–99)
LYMPHOCYTES # BLD AUTO: 2.8 X10E3/UL (ref 0.7–3.1)
LYMPHOCYTES NFR BLD AUTO: 42 %
MCH RBC QN AUTO: 29.7 PG (ref 26.6–33)
MCHC RBC AUTO-ENTMCNC: 33.5 G/DL (ref 31.5–35.7)
MCV RBC AUTO: 89 FL (ref 79–97)
MONOCYTES # BLD AUTO: 0.7 X10E3/UL (ref 0.1–0.9)
MONOCYTES NFR BLD AUTO: 10 %
NEUTROPHILS # BLD AUTO: 2.8 X10E3/UL (ref 1.4–7)
NEUTROPHILS NFR BLD AUTO: 41 %
PLATELET # BLD AUTO: 265 X10E3/UL (ref 150–450)
POTASSIUM SERPL-SCNC: 4.2 MMOL/L (ref 3.5–5.2)
PROT SERPL-MCNC: 7.1 G/DL (ref 6–8.5)
RBC # BLD AUTO: 4.68 X10E6/UL (ref 3.77–5.28)
SL AMB VLDL CHOLESTEROL CALC: 12 MG/DL (ref 5–40)
SODIUM SERPL-SCNC: 136 MMOL/L (ref 134–144)
T4 FREE SERPL-MCNC: 1.69 NG/DL (ref 0.82–1.77)
TRIGL SERPL-MCNC: 69 MG/DL (ref 0–149)
TSH SERPL DL<=0.005 MIU/L-ACNC: 3.55 UIU/ML (ref 0.45–4.5)
WBC # BLD AUTO: 6.9 X10E3/UL (ref 3.4–10.8)

## 2024-05-08 ENCOUNTER — OFFICE VISIT (OUTPATIENT)
Dept: ENDOCRINOLOGY | Facility: HOSPITAL | Age: 34
End: 2024-05-08
Payer: COMMERCIAL

## 2024-05-08 VITALS
BODY MASS INDEX: 33.01 KG/M2 | DIASTOLIC BLOOD PRESSURE: 74 MMHG | WEIGHT: 179.4 LBS | HEART RATE: 98 BPM | HEIGHT: 62 IN | SYSTOLIC BLOOD PRESSURE: 106 MMHG

## 2024-05-08 DIAGNOSIS — E10.3493: ICD-10-CM

## 2024-05-08 DIAGNOSIS — E10.40 TYPE 1 DIABETES MELLITUS WITH DIABETIC NEUROPATHY (HCC): Primary | ICD-10-CM

## 2024-05-08 DIAGNOSIS — E10.3291 MILD NONPROLIFERATIVE DIABETIC RETINOPATHY OF RIGHT EYE WITHOUT MACULAR EDEMA ASSOCIATED WITH TYPE 1 DIABETES MELLITUS (HCC): ICD-10-CM

## 2024-05-08 DIAGNOSIS — E10.3212 MILD NONPROLIFERATIVE DIABETIC RETINOPATHY OF LEFT EYE WITH MACULAR EDEMA ASSOCIATED WITH TYPE 1 DIABETES MELLITUS (HCC): ICD-10-CM

## 2024-05-08 DIAGNOSIS — E03.9 HYPOTHYROIDISM, UNSPECIFIED TYPE: ICD-10-CM

## 2024-05-08 DIAGNOSIS — E03.9 ACQUIRED HYPOTHYROIDISM: ICD-10-CM

## 2024-05-08 PROCEDURE — 95251 CONT GLUC MNTR ANALYSIS I&R: CPT | Performed by: NURSE PRACTITIONER

## 2024-05-08 PROCEDURE — 99214 OFFICE O/P EST MOD 30 MIN: CPT | Performed by: NURSE PRACTITIONER

## 2024-05-08 NOTE — PATIENT INSTRUCTIONS
Be mindful of diet.     Stay active and stay hydrated.     Continue the DEX COM.     We did not make any pump changes at this time.    Update your Tandem pump to utilize the DEXCOM G7 when you get back from your honeymoon.     KEEP UP THE GREAT WORK !!!     Take levothyroxine 150 - 5 days a week with no tablet 2 days a week.     Take your levothyroxine consistently, in the morning, on an empty stomach with only water and wait 30 min before eating and 4 hr before taking multivitamins or supplements.     Obtain lab work prior to next visit.

## 2024-05-08 NOTE — PROGRESS NOTES
Fang Farrell 33 y.o. female MRN: [id number]    Encounter: [id number]      Assessment/Plan     Assessment:  This is a 33 y.o.-year-old female with type 1 diabetes and hypothyroidism.     Plan:  1.  Hypothyroidism.  Most recent TSH is in the normal range at 3.55.  For now, she will continue levothyroxine 150 mcg - 1 tablet daily for 5 days of the week with no tablet on Saturday or Sunday.  She will focus on consistency.  Check TSH and free T4 in prior to next visit.      2.  Type 1 diabetes.  Recent hemoglobin A1c is 5.5.  Overall, she is relatively well-controlled when reviewing her Dexcom continuous glucose monitor.  For now, continue pump current settings.  She will continue to utilize the pump and sensor and send a report to the office in 2 weeks for review.  Hemoglobin A1c prior to next visit.    CC: Type 1 Diabetes/Hypothyroidism follow up    History of Present Illness     HPI:  33 y.o. year old female with history of hypothyroidism and type 1 diabetes with retinopathy for follow-up visit.     She has hypothyroidism and takes levothyroxine 150 mcg 1 tablet - daily. She is currently 9 months postpartum. her most resent TSH from May 3, 2024 is 3.550 with a Free T4 of 1.69.     She has type 1 diabetes diagnosed 33 years ago.  She is currently pregnant on an insulin pump utilizing a t:slim to insulin pump with Dexcom G6 continuous glucose monitoring system integrated with her insulin pump.  Recent hemoglobin A1c from January 31, 2024 is 5.2.  Last diabetic eye exam was in summer 2022  and last diabetic foot exam was January 31, 2024.     She utilizes the Tandem T-Slim X 2 insulin pump with Dexcom G6 continuous glucose monitoring system integrated with her insulin pump.  She is currently not in control IQ.  Basal rates:  12 AM to 1 AM 0.35 units/h, 1 AM to 3 AM 0.7 units/h, 3 AM to 6 AM 1 unit/h, 6 AM to 9:15 AM 0.9 units/h, 9:15 AM to 11:45 AM 1 unit/h, 11:45 AM to 3:30 PM 0.7 units/h, 3:30 PM to 5:30 PM 0.85  units/h, 5:30 PM to 7:30 PM 0.88 units/h, 7:30 PM to 10 PM 1.1 units/h, 10 PM to 12 AM 0.65 units/h.  Bolus rates:  12 AM to 6 AM 1 unit per 5 g carbohydrate, 6 AM to 11:45 AM 1 unit per 4 g carbohydrate, 11:45 AM to 5:30 PM 1 unit per 5 g carbohydrate, 5:30 PM to 7:30 PM 1 unit per 4.5 g carbohydrate, 7:30 PM to 12 AM 1 unit per 4 g carbohydrate.  Insulin sensitivity factor:   12 AM to 1 AM 1 unit per 40 blood sugar points for target blood glucose of 100, 1 AM to 6 AM 1 unit per 45 blood sugar points for target blood glucose of 95, 6 AM to 5:30 PM 1 unit per 40 blood sugar points for target blood glucose of 100,  5:30 PM to 7:30 PM 1 unit per 37 blood sugar points for target blood glucose of 100, and 7:30 PM to 12 AM 1 unit per 45 blood sugar points for target blood glucose of 100.  Insulin action:  4.0 hours.     Review of Systems   Constitutional: Negative.  Negative for chills, fatigue and fever.   HENT: Negative.  Negative for trouble swallowing and voice change.    Eyes: Negative.  Negative for photophobia, pain, discharge, redness, itching and visual disturbance.   Respiratory: Negative.  Negative for chest tightness and shortness of breath.    Cardiovascular: Negative.  Negative for chest pain.   Gastrointestinal: Negative.  Negative for abdominal pain, constipation, diarrhea and vomiting.   Endocrine: Negative for cold intolerance, heat intolerance, polydipsia, polyphagia and polyuria.   Genitourinary: Negative.    Musculoskeletal: Negative.    Skin: Negative.    Allergic/Immunologic: Negative.    Neurological: Negative.  Negative for dizziness, syncope, light-headedness and headaches.   Hematological: Negative.    Psychiatric/Behavioral: Negative.     All other systems reviewed and are negative.      Historical Information   Past Medical History:   Diagnosis Date    Anxiety     Diabetes mellitus (HCC)     type 1; since age 2; on dexcom (since summer 2022) and Tandem T-slim (<2mos) with excellent trend down  in A1 (5.5!); had rough go in college years (did not take care of her diabetes); follows with LAKESHA Dhaliwal.    Elective procedure for unacceptable cosmetic appearance     smart lipo    Hypothyroidism      Past Surgical History:   Procedure Laterality Date    HAND SURGERY      ND  DELIVERY ONLY N/A 2023    Procedure:  SECTION ();  Surgeon: Marisol Ma MD;  Location: [facility] ;  Service: Obstetrics     Social History   Social History     Substance and Sexual Activity   Alcohol Use Not Currently    Comment: I have not had a drink in about 1 year     Social History     Substance and Sexual Activity   Drug Use No     Social History     Tobacco Use   Smoking Status Never   Smokeless Tobacco Never     Family History:   Family History   Problem Relation Age of Onset    Asthma Mother     Hypertension Mother     No Known Problems Father     Asthma Sister     No Known Problems Sister     No Known Problems Maternal Grandmother     No Known Problems Maternal Grandfather     No Known Problems Paternal Grandmother     No Known Problems Paternal Grandfather        Meds/Allergies   Current Outpatient Medications   Medication Sig Dispense Refill    Apri 0.15-30 MG-MCG per tablet TAKE 1 TABLET BY MOUTH EVERY DAY 84 tablet 4    Continuous Blood Gluc  (Dexcom G6 ) TONA Use to monitor blood sugar 1 each 0    Continuous Blood Gluc Sensor (Dexcom G6 Sensor) MISC CHANGE EVERY 10 DAYS 9 each 5    Continuous Blood Gluc Transmit (Dexcom G6 Transmitter) MISC Change every 90 days 1 each 3    insulin lispro (HumALOG/ADMELOG) 100 units/mL injection USE VIA INSULIN PUMP UP  UNITS DAILY 30 mL 6    levothyroxine 150 mcg tablet Take 1 tablet (150 mcg total) by mouth daily 30 tablet 11     No current facility-administered medications for this visit.     Allergies   Allergen Reactions    Sulfamethoxazole      Other reaction(s): severe nausea/vomiting (2017)    Trimethoprim       "Other reaction(s): severe nausea/vomiting (03/13/2017)       Objective   Vitals: Blood pressure 106/74, pulse 98, height 5' 2\" (1.575 m), weight 81.4 kg (179 lb 6.4 oz), not currently breastfeeding.    Physical Exam  Vitals reviewed.   Constitutional:       Appearance: She is well-developed. She is obese.   HENT:      Head: Normocephalic and atraumatic.   Eyes:      Conjunctiva/sclera: Conjunctivae normal.      Pupils: Pupils are equal, round, and reactive to light.   Cardiovascular:      Rate and Rhythm: Normal rate and regular rhythm.      Heart sounds: Normal heart sounds.   Pulmonary:      Effort: Pulmonary effort is normal.      Breath sounds: Normal breath sounds.   Abdominal:      General: Bowel sounds are normal.      Palpations: Abdomen is soft.   Musculoskeletal:         General: Normal range of motion.      Cervical back: Normal range of motion and neck supple.   Skin:     General: Skin is warm and dry.   Neurological:      Mental Status: She is alert and oriented to person, place, and time.   Psychiatric:         Behavior: Behavior normal.         Thought Content: Thought content normal.         Judgment: Judgment normal.       Lab Results:   Lab Results   Component Value Date/Time    Hemoglobin A1C 5.5 05/03/2024 08:44 AM    Hemoglobin A1C 5.2 01/31/2024 01:30 PM    Hemoglobin A1C 5.0 10/23/2023 09:40 AM    Hemoglobin A1C 5.0 06/22/2023 09:26 AM    WBC 18.84 (H) 08/05/2023 05:10 AM    WBC 13.54 (H) 08/02/2023 05:54 PM    White Blood Cell Count 6.9 05/03/2024 08:44 AM    White Blood Cell Count 6.6 10/23/2023 09:40 AM    White Blood Cell Count 15.9 (H) 06/22/2023 09:22 AM    Hemoglobin 13.9 05/03/2024 08:44 AM    Hemoglobin 12.5 10/23/2023 09:40 AM    Hemoglobin 10.2 (L) 08/05/2023 05:10 AM    Hemoglobin 12.5 08/02/2023 05:54 PM    Hemoglobin 12.3 06/22/2023 09:22 AM    Hematocrit 30.1 (L) 08/05/2023 05:10 AM    Hematocrit 35.9 08/02/2023 05:54 PM    HCT 41.5 05/03/2024 08:44 AM    HCT 38.0 10/23/2023 " 09:40 AM    HCT 35.8 06/22/2023 09:22 AM    MCV 89 05/03/2024 08:44 AM    MCV 86 10/23/2023 09:40 AM    MCV 90 08/05/2023 05:10 AM    MCV 88 08/02/2023 05:54 PM    MCV 92 06/22/2023 09:22 AM    Platelet Count 265 05/03/2024 08:44 AM    Platelet Count 255 10/23/2023 09:40 AM    Platelet Count 230 06/22/2023 09:22 AM    Platelets 188 08/05/2023 05:10 AM    Platelets 236 08/02/2023 05:54 PM    BUN 11 05/03/2024 08:44 AM    BUN 10 10/23/2023 09:40 AM    BUN 11 08/02/2023 05:54 PM    BUN 10 06/22/2023 09:22 AM    Potassium 4.2 05/03/2024 08:44 AM    Potassium 4.1 10/23/2023 09:40 AM    Potassium 3.8 08/02/2023 05:54 PM    Potassium 4.1 06/22/2023 09:22 AM    Chloride 102 05/03/2024 08:44 AM    Chloride 107 (H) 10/23/2023 09:40 AM    Chloride 104 08/02/2023 05:54 PM    Chloride 103 06/22/2023 09:22 AM    CO2 23 05/03/2024 08:44 AM    CO2 24 10/23/2023 09:40 AM    CO2 23 08/02/2023 05:54 PM    CO2 23 06/22/2023 09:22 AM    Creatinine 0.89 05/03/2024 08:44 AM    Creatinine 0.71 10/23/2023 09:40 AM    Creatinine 0.59 (L) 08/02/2023 05:54 PM    Creatinine 0.69 06/22/2023 09:22 AM    AST 16 05/03/2024 08:44 AM    AST 15 10/23/2023 09:40 AM    AST 15 08/02/2023 05:54 PM    AST 17 06/22/2023 09:22 AM    ALT 13 05/03/2024 08:44 AM    ALT 15 10/23/2023 09:40 AM    ALT 9 08/02/2023 05:54 PM    ALT 8 06/22/2023 09:22 AM    Total Protein 6.9 08/02/2023 05:54 PM    Protein, Total 7.1 05/03/2024 08:44 AM    Protein, Total 6.9 10/23/2023 09:40 AM    Protein, Total 6.7 06/22/2023 09:22 AM    Albumin 3.8 (L) 05/03/2024 08:44 AM    Albumin 3.8 (L) 10/23/2023 09:40 AM    Albumin 3.2 (L) 08/02/2023 05:54 PM    Albumin 3.3 (L) 06/22/2023 09:22 AM    Globulin, Total 3.3 05/03/2024 08:44 AM    Globulin, Total 3.1 10/23/2023 09:40 AM    Globulin, Total 3.4 06/22/2023 09:22 AM    HDL 65 05/03/2024 08:44 AM    HDL 43 10/23/2023 09:40 AM    Triglycerides 69 05/03/2024 08:44 AM    Triglycerides 95 10/23/2023 09:40 AM     Portions of the record may  "have been created with voice recognition software. Occasional wrong word or \"sound a like\" substitutions may have occurred due to the inherent limitations of voice recognition software. Read the chart carefully and recognize, using context, where substitutions have occurred.    "

## 2024-05-31 ENCOUNTER — OFFICE VISIT (OUTPATIENT)
Age: 34
End: 2024-05-31
Payer: COMMERCIAL

## 2024-05-31 VITALS
HEIGHT: 62 IN | SYSTOLIC BLOOD PRESSURE: 112 MMHG | WEIGHT: 180 LBS | BODY MASS INDEX: 33.13 KG/M2 | DIASTOLIC BLOOD PRESSURE: 62 MMHG

## 2024-05-31 DIAGNOSIS — B37.31 YEAST VAGINITIS: Primary | ICD-10-CM

## 2024-05-31 PROCEDURE — 99213 OFFICE O/P EST LOW 20 MIN: CPT | Performed by: OBSTETRICS & GYNECOLOGY

## 2024-05-31 RX ORDER — FLUCONAZOLE 150 MG/1
150 TABLET ORAL WEEKLY
Qty: 3 TABLET | Refills: 3 | Status: SHIPPED | OUTPATIENT
Start: 2024-05-31 | End: 2024-06-15

## 2024-05-31 NOTE — PROGRESS NOTES
"What we talked about today:    Patient Instructions   Recurrent yeast infection:  RTO in 2wks for vaginal exam  OK to use antifungal shampoo externally.    Fluconazole every 3 days if bad symptoms, if light, can do weekly dosing.   Start Femdophilus probiotics (refrigerated kind), take for 3-6months.   Patient verbalized understanding of today's discussion with all questions and concerns addressed to her satisfaction.            Assessment/Plan:    1. Yeast vaginitis  -     fluconazole (DIFLUCAN) 150 mg tablet; Take 1 tablet (150 mg total) by mouth once a week for 3 doses          Subjective:      Patient ID: Fang Farrell is a 33 y.o. female, presents today complaining of recurrent yeast infx    HPI:    Pt states every time she has intercourse she has a yeast infx a few days after.  States feels painful just after sex.  Has been using OTC 3day monistat.      Partner is circumcised.          The following portions of the patient's history were reviewed and updated as appropriate: allergies, current medications, past family history, past medical history, past social history, past surgical history, and problem list.      Review of Systems   Constitutional:  Negative for chills, fatigue and fever.   Respiratory: Negative.     Cardiovascular: Negative.    Gastrointestinal: Negative.    Endocrine: Negative.    Genitourinary: Negative.    Musculoskeletal: Negative.    Skin: Negative.    Allergic/Immunologic: Negative.    Neurological: Negative.    Hematological: Negative.    Psychiatric/Behavioral: Negative.             Objective:      /62 (BP Location: Left arm, Patient Position: Sitting, Cuff Size: Standard)   Ht 5' 2\" (1.575 m)   Wt 81.6 kg (180 lb)   BMI 32.92 kg/m²        Physical Exam  Vitals reviewed.   Constitutional:       General: She is not in acute distress.     Appearance: Normal appearance. She is not ill-appearing.   HENT:      Head: Normocephalic and atraumatic.   Eyes:      Extraocular Movements: " Extraocular movements intact.   Musculoskeletal:      Cervical back: Normal range of motion.   Skin:     General: Skin is warm and dry.   Neurological:      Mental Status: She is alert.   Psychiatric:         Mood and Affect: Mood normal.         Behavior: Behavior normal.         Thought Content: Thought content normal.         Judgment: Judgment normal.           GYN exam: NEFG, no s/s of skin yeast.      Wetprep was not performed today.            DEBI Raymond MD, FACOG

## 2024-05-31 NOTE — PATIENT INSTRUCTIONS
Recurrent yeast infection:  RTO in 2wks for vaginal exam  OK to use antifungal shampoo externally.    Fluconazole every 3 days if bad symptoms, if light, can do weekly dosing.   Start Femdophilus probiotics (refrigerated kind), take for 3-6months.   Patient verbalized understanding of today's discussion with all questions and concerns addressed to her satisfaction.

## 2024-06-12 ENCOUNTER — OFFICE VISIT (OUTPATIENT)
Age: 34
End: 2024-06-12

## 2024-06-12 VITALS
DIASTOLIC BLOOD PRESSURE: 64 MMHG | WEIGHT: 178.4 LBS | BODY MASS INDEX: 32.83 KG/M2 | SYSTOLIC BLOOD PRESSURE: 118 MMHG | HEIGHT: 62 IN

## 2024-06-12 DIAGNOSIS — N76.0 RECURRENT VAGINITIS: Primary | ICD-10-CM

## 2024-06-12 PROCEDURE — 87480 CANDIDA DNA DIR PROBE: CPT | Performed by: NURSE PRACTITIONER

## 2024-06-12 PROCEDURE — 87510 GARDNER VAG DNA DIR PROBE: CPT | Performed by: NURSE PRACTITIONER

## 2024-06-12 PROCEDURE — 87660 TRICHOMONAS VAGIN DIR PROBE: CPT | Performed by: NURSE PRACTITIONER

## 2024-06-12 NOTE — PROGRESS NOTES
"Assessment/Plan:     1. Recurrent vaginitis  Will treat accordingly.  Advised that we would want to evaluate her each time she is symptomatic and after 3 proven positive cultures we could start a suppressive treatment regimen.  Reinforced no use of soap, d/c shaving, try coconut oil for vaginal lubrication.    - VAGINOSIS DNA PROBE      Subjective:     Patient ID: Fang Farrell is a 33 y.o. female.    HPI  PROBLEM VISIT  CC: follow up vaginitis    34 yo   Presents for f/u after having seen Dr Raymond for recurrent vaginitis.    Tends to get vaginal infections monthly, usually associated with sex, with symptoms of redness, discharge, tenderness.  Has been occurring since delivery of her baby 10 mo ago, 2023.  Has had \"8-10 vaginal infections\" since that time.  Uses monistat 3 with effect.  Has also used oral fluconazole with effect.  No culture done at last visit with Dr Raymond due to patient having her period.  Today is not having any symptoms, even after having sex.    Hx of type 1 diabetes, on insulin.  Hgb A1c 5.0.  Has started a vaginal probiotic.    Review of Systems   Constitutional:  Negative for chills and fever.   Genitourinary:  Negative for dyspareunia, dysuria, frequency, genital sores, hematuria, menstrual problem, pelvic pain, urgency, vaginal bleeding, vaginal discharge and vaginal pain.         Objective:     Physical Exam  Constitutional:       General: She is not in acute distress.     Appearance: Normal appearance. She is well-developed. She is not ill-appearing or diaphoretic.      Comments: Bmi 32.6   HENT:      Head: Normocephalic and atraumatic.   Eyes:      Pupils: Pupils are equal, round, and reactive to light.   Pulmonary:      Effort: Pulmonary effort is normal.   Abdominal:      General: Abdomen is flat.      Palpations: Abdomen is soft.   Genitourinary:     General: Normal vulva.      Exam position: Lithotomy position.      Labia:         Right: No rash, tenderness, lesion or " injury.         Left: No rash, tenderness, lesion or injury.       Vagina: No signs of injury and foreign body. No vaginal discharge, erythema, tenderness or bleeding.      Cervix: No cervical motion tenderness, discharge or friability.      Uterus: Not enlarged and not tender.       Adnexa:         Right: No mass or tenderness.          Left: No mass or tenderness.     Skin:     General: Skin is warm and dry.   Neurological:      General: No focal deficit present.      Mental Status: She is alert and oriented to person, place, and time.   Psychiatric:         Mood and Affect: Mood normal.         Behavior: Behavior normal.         Thought Content: Thought content normal.         Judgment: Judgment normal.

## 2024-06-13 LAB
CANDIDA RRNA VAG QL PROBE: NOT DETECTED
G VAGINALIS RRNA GENITAL QL PROBE: NOT DETECTED
T VAGINALIS RRNA GENITAL QL PROBE: NOT DETECTED

## 2024-07-03 DIAGNOSIS — E10.40 TYPE 1 DIABETES MELLITUS WITH DIABETIC NEUROPATHY (HCC): ICD-10-CM

## 2024-07-03 RX ORDER — PROCHLORPERAZINE 25 MG/1
SUPPOSITORY RECTAL
Qty: 1 EACH | Refills: 0 | Status: SHIPPED | OUTPATIENT
Start: 2024-07-03

## 2024-07-03 NOTE — TELEPHONE ENCOUNTER
Reason for call:   [x] Refill   [] Prior Auth  [x] Other: DOES NOT WANT ANY REFILLS ADDED. SHE WILL BE CHANGING TO G7 AFTER THIS REFILL    Office:   [] PCP/Provider -   [x] Specialty/Provider - josé miguel/ LAKESHA Carlosn     Medication:     Continuous Blood Gluc Transmit (Dexcom G6 Transmitter) MISC       Dose/Frequency:     Change every 90 days       Quantity: 1    Pharmacy: [facility] #[id number] - [place] - [address] [phone]     Does the patient have enough for 3 days?   [] Yes   [x] No - Send as HP to POD

## 2024-08-01 DIAGNOSIS — E10.40 TYPE 1 DIABETES MELLITUS WITH DIABETIC NEUROPATHY (HCC): ICD-10-CM

## 2024-08-01 RX ORDER — PROCHLORPERAZINE 25 MG/1
SUPPOSITORY RECTAL
Qty: 1 EACH | Refills: 1 | Status: SHIPPED | OUTPATIENT
Start: 2024-08-01

## 2024-08-20 LAB
ALBUMIN SERPL-MCNC: 3.9 G/DL (ref 3.9–4.9)
ALP SERPL-CCNC: 56 IU/L (ref 44–121)
ALT SERPL-CCNC: 13 IU/L (ref 0–32)
AST SERPL-CCNC: 18 IU/L (ref 0–40)
BASOPHILS # BLD AUTO: 0.1 X10E3/UL (ref 0–0.2)
BASOPHILS NFR BLD AUTO: 1 %
BILIRUB SERPL-MCNC: 0.8 MG/DL (ref 0–1.2)
BUN SERPL-MCNC: 13 MG/DL (ref 6–20)
BUN/CREAT SERPL: 14 (ref 9–23)
CALCIUM SERPL-MCNC: 9.5 MG/DL (ref 8.7–10.2)
CHLORIDE SERPL-SCNC: 100 MMOL/L (ref 96–106)
CO2 SERPL-SCNC: 24 MMOL/L (ref 20–29)
CREAT SERPL-MCNC: 0.92 MG/DL (ref 0.57–1)
EGFR: 83 ML/MIN/1.73
EOSINOPHIL # BLD AUTO: 0.1 X10E3/UL (ref 0–0.4)
EOSINOPHIL NFR BLD AUTO: 1 %
ERYTHROCYTE [DISTWIDTH] IN BLOOD BY AUTOMATED COUNT: 12.3 % (ref 11.7–15.4)
GLOBULIN SER-MCNC: 3.5 G/DL (ref 1.5–4.5)
GLUCOSE SERPL-MCNC: 109 MG/DL (ref 70–99)
HBA1C MFR BLD: 5.4 % (ref 4.8–5.6)
HCT VFR BLD AUTO: 41.2 % (ref 34–46.6)
HGB BLD-MCNC: 13.6 G/DL (ref 11.1–15.9)
IMM GRANULOCYTES # BLD: 0 X10E3/UL (ref 0–0.1)
IMM GRANULOCYTES NFR BLD: 0 %
LYMPHOCYTES # BLD AUTO: 4 X10E3/UL (ref 0.7–3.1)
LYMPHOCYTES NFR BLD AUTO: 47 %
MCH RBC QN AUTO: 28.4 PG (ref 26.6–33)
MCHC RBC AUTO-ENTMCNC: 33 G/DL (ref 31.5–35.7)
MCV RBC AUTO: 86 FL (ref 79–97)
MONOCYTES # BLD AUTO: 1 X10E3/UL (ref 0.1–0.9)
MONOCYTES NFR BLD AUTO: 12 %
NEUTROPHILS # BLD AUTO: 3.4 X10E3/UL (ref 1.4–7)
NEUTROPHILS NFR BLD AUTO: 39 %
PLATELET # BLD AUTO: 341 X10E3/UL (ref 150–450)
POTASSIUM SERPL-SCNC: 4.1 MMOL/L (ref 3.5–5.2)
PROT SERPL-MCNC: 7.4 G/DL (ref 6–8.5)
RBC # BLD AUTO: 4.79 X10E6/UL (ref 3.77–5.28)
SODIUM SERPL-SCNC: 137 MMOL/L (ref 134–144)
T4 FREE SERPL-MCNC: 1.73 NG/DL (ref 0.82–1.77)
TSH SERPL DL<=0.005 MIU/L-ACNC: 2.41 UIU/ML (ref 0.45–4.5)
WBC # BLD AUTO: 8.6 X10E3/UL (ref 3.4–10.8)

## 2024-08-21 ENCOUNTER — OFFICE VISIT (OUTPATIENT)
Dept: ENDOCRINOLOGY | Facility: HOSPITAL | Age: 34
End: 2024-08-21
Payer: COMMERCIAL

## 2024-08-21 VITALS
HEART RATE: 88 BPM | SYSTOLIC BLOOD PRESSURE: 114 MMHG | DIASTOLIC BLOOD PRESSURE: 70 MMHG | BODY MASS INDEX: 32.13 KG/M2 | WEIGHT: 174.6 LBS | HEIGHT: 62 IN

## 2024-08-21 DIAGNOSIS — E10.9 TYPE 1 DIABETES MELLITUS WITHOUT COMPLICATION (HCC): ICD-10-CM

## 2024-08-21 DIAGNOSIS — E10.40 TYPE 1 DIABETES MELLITUS WITH DIABETIC NEUROPATHY (HCC): ICD-10-CM

## 2024-08-21 DIAGNOSIS — E03.9 HYPOTHYROIDISM, UNSPECIFIED TYPE: ICD-10-CM

## 2024-08-21 DIAGNOSIS — E10.3291 MILD NONPROLIFERATIVE DIABETIC RETINOPATHY OF RIGHT EYE WITHOUT MACULAR EDEMA ASSOCIATED WITH TYPE 1 DIABETES MELLITUS (HCC): ICD-10-CM

## 2024-08-21 DIAGNOSIS — E03.9 ACQUIRED HYPOTHYROIDISM: ICD-10-CM

## 2024-08-21 DIAGNOSIS — E10.3212 MILD NONPROLIFERATIVE DIABETIC RETINOPATHY OF LEFT EYE WITH MACULAR EDEMA ASSOCIATED WITH TYPE 1 DIABETES MELLITUS (HCC): ICD-10-CM

## 2024-08-21 DIAGNOSIS — E10.3493: Primary | ICD-10-CM

## 2024-08-21 PROCEDURE — 99214 OFFICE O/P EST MOD 30 MIN: CPT | Performed by: NURSE PRACTITIONER

## 2024-08-21 PROCEDURE — 95251 CONT GLUC MNTR ANALYSIS I&R: CPT | Performed by: NURSE PRACTITIONER

## 2024-08-21 RX ORDER — ASPIRIN 81 MG/1
81 TABLET ORAL DAILY
COMMUNITY

## 2024-08-21 RX ORDER — ACYCLOVIR 400 MG/1
1 TABLET ORAL
Qty: 9 EACH | Refills: 3 | Status: SHIPPED | OUTPATIENT
Start: 2024-08-21

## 2024-08-21 RX ORDER — DOCUSATE SODIUM 100 MG/1
100 CAPSULE, LIQUID FILLED ORAL AS NEEDED
COMMUNITY

## 2024-08-21 NOTE — PROGRESS NOTES
Fang Farrell 33 y.o. female MRN: [id number]    Encounter: [id number]      Assessment & Plan     Assessment:  This is a 33 y.o.-year-old female with type 1 diabetes and hypothyroidism.     Plan:  1.  Hypothyroidism.  Most recent TSH is in the normal range at 2.410.  For now, she will continue levothyroxine 150 mcg - 1 tablet daily for 5 days of the week with no tablet on Saturday or Sunday.  She will focus on consistency.  Check TSH and free T4 in prior to next visit.      2.  Type 1 diabetes.  Recent hemoglobin A1c is 5.4.  Overall, she is relatively well-controlled when reviewing her Dexcom continuous glucose monitor.  For now, continue pump current settings.  After some discussion, I suspect that she is having some issues with absorption of her insulin through her auto soft XC infusion sets.  I provided her with some true steel sets to trial.  She will work to upgrade her tandem X to pump with the latest software to utilize a Dexcom G7.  She would like to meet with diabetes education for the initial start of her Dexcom G7.  She will continue to utilize the pump and sensor and send a report to the office in 2 weeks for review.  Hemoglobin A1c prior to next visit    CC: Type 1 Diabetes follow up    History of Present Illness     HPI:  33 y.o. year old female with history of hypothyroidism and type 1 diabetes with retinopathy for follow-up visit.     She has hypothyroidism and takes levothyroxine 150 mcg 1 tablet - daily. She is currently 1 year postpartum. her most resent TSH from August 19, 2024 is 2.410 with a Free T4 of 1.73.     She has type 1 diabetes diagnosed 33 years ago.  She is currently pregnant on an insulin pump utilizing a t:slim to insulin pump with Dexcom G6 continuous glucose monitoring system integrated with her insulin pump.  Recent hemoglobin A1c from August 19, 2024 is 5.4.  Last diabetic eye exam was in January 2024 and last diabetic foot exam was January 31, 2024.     She utilizes the Tandem  T-Slim X 2 insulin pump with Dexcom G6 continuous glucose monitoring system integrated with her insulin pump.  She is currently not in control IQ.  Basal rates:  12 AM to 1 AM 0.35 units/h, 1 AM to 3 AM 0.7 units/h, 3 AM to 6 AM 1 unit/h, 6 AM to 9:15 AM 0.9 units/h, 9:15 AM to 11:45 AM 1 unit/h, 11:45 AM to 3:30 PM 0.7 units/h, 3:30 PM to 5:30 PM 0.85 units/h, 5:30 PM to 7:30 PM 0.88 units/h, 7:30 PM to 10 PM 1.1 units/h, 10 PM to 12 AM 0.65 units/h.  Bolus rates:  12 AM to 6 AM 1 unit per 5 g carbohydrate, 6 AM to 11:45 AM 1 unit per 4 g carbohydrate, 11:45 AM to 5:30 PM 1 unit per 5 g carbohydrate, 5:30 PM to 7:30 PM 1 unit per 4.5 g carbohydrate, 7:30 PM to 12 AM 1 unit per 4 g carbohydrate.  Insulin sensitivity factor:   12 AM to 1 AM 1 unit per 40 blood sugar points for target blood glucose of 100, 1 AM to 6 AM 1 unit per 45 blood sugar points for target blood glucose of 95, 6 AM to 5:30 PM 1 unit per 40 blood sugar points for target blood glucose of 100,  5:30 PM to 7:30 PM 1 unit per 37 blood sugar points for target blood glucose of 100, and 7:30 PM to 12 AM 1 unit per 45 blood sugar points for target blood glucose of 100.  Insulin action:  4.0 hours.     Review of Systems   Constitutional: Negative.  Negative for chills, fatigue and fever.   HENT: Negative.  Negative for trouble swallowing and voice change.    Eyes: Negative.  Negative for photophobia, pain, discharge, redness, itching and visual disturbance.   Respiratory: Negative.  Negative for chest tightness and shortness of breath.    Cardiovascular: Negative.  Negative for chest pain and palpitations.   Gastrointestinal: Negative.  Negative for abdominal pain, constipation, diarrhea and vomiting.   Endocrine: Negative for cold intolerance, heat intolerance, polydipsia, polyphagia and polyuria.   Genitourinary: Negative.    Musculoskeletal: Negative.    Skin: Negative.    Allergic/Immunologic: Negative.    Neurological: Negative.  Negative for  dizziness, syncope, light-headedness and headaches.   Hematological: Negative.    Psychiatric/Behavioral: Negative.     All other systems reviewed and are negative.      Historical Information   Past Medical History:   Diagnosis Date    Anxiety     Diabetes mellitus (HCC)     type 1; since age 2; on dexcom (since summer 2022) and Tandem T-slim (<2mos) with excellent trend down in A1 (5.5!); had rough go in college years (did not take care of her diabetes); follows with LAKESHA Dhaliwal.    Elective procedure for unacceptable cosmetic appearance     smart lipo    Hypothyroidism      Past Surgical History:   Procedure Laterality Date    BARIATRIC SURGERY  ?    Smart Lipo    HAND SURGERY      OK  DELIVERY ONLY N/A 2023    Procedure:  SECTION ();  Surgeon: Marisol Ma MD;  Location: [facility] ;  Service: Obstetrics     Social History   Social History     Substance and Sexual Activity   Alcohol Use Not Currently    Comment: I have not had a drink in about 1 year     Social History     Substance and Sexual Activity   Drug Use Never     Social History     Tobacco Use   Smoking Status Never   Smokeless Tobacco Never     Family History:   Family History   Problem Relation Age of Onset    Asthma Mother     Hypertension Mother     No Known Problems Father     Asthma Sister     No Known Problems Sister     No Known Problems Maternal Grandmother     No Known Problems Maternal Grandfather     No Known Problems Paternal Grandmother     No Known Problems Paternal Grandfather        Meds/Allergies   Current Outpatient Medications   Medication Sig Dispense Refill    Continuous Blood Gluc Sensor (Dexcom G6 Sensor) MISC CHANGE EVERY 10 DAYS 9 each 5    Continuous Glucose Sensor (Dexcom G7 Sensor) Use 1 Device every 10 days 9 each 3    Continuous Glucose Transmitter (Dexcom G6 Transmitter) MISC CHANGE EVERY 90 DAYS AS DIRECTED 1 each 1    insulin lispro (HumALOG/ADMELOG) 100 units/mL  "injection USE VIA INSULIN PUMP UP  UNITS DAILY 30 mL 6    levothyroxine 150 mcg tablet Take 1 tablet (150 mcg total) by mouth daily 30 tablet 11    aspirin (ECOTRIN LOW STRENGTH) 81 mg EC tablet Take 81 mg by mouth daily (Patient not taking: Reported on 8/21/2024)      docusate sodium (Colace) 100 mg capsule Take 100 mg by mouth as needed (Patient not taking: Reported on 8/21/2024)       No current facility-administered medications for this visit.     Allergies   Allergen Reactions    Sulfamethoxazole      Other reaction(s): severe nausea/vomiting (03/13/2017)    Trimethoprim      Other reaction(s): severe nausea/vomiting (03/13/2017)       Objective   Vitals: Blood pressure 114/70, pulse 88, height 5' 2\" (1.575 m), weight 79.2 kg (174 lb 9.6 oz), not currently breastfeeding.    Physical Exam  Vitals reviewed.   Constitutional:       Appearance: She is well-developed.   HENT:      Head: Normocephalic and atraumatic.   Eyes:      Conjunctiva/sclera: Conjunctivae normal.      Pupils: Pupils are equal, round, and reactive to light.   Cardiovascular:      Rate and Rhythm: Normal rate and regular rhythm.      Heart sounds: Normal heart sounds.   Pulmonary:      Effort: Pulmonary effort is normal.      Breath sounds: Normal breath sounds.   Abdominal:      General: Bowel sounds are normal.      Palpations: Abdomen is soft.   Musculoskeletal:         General: Normal range of motion.      Cervical back: Normal range of motion and neck supple.   Skin:     General: Skin is warm and dry.   Neurological:      Mental Status: She is alert and oriented to person, place, and time.   Psychiatric:         Behavior: Behavior normal.         Thought Content: Thought content normal.         Judgment: Judgment normal.         Lab Results:   Lab Results   Component Value Date/Time    Hemoglobin A1C 5.4 08/19/2024 08:31 AM    Hemoglobin A1C 5.5 05/03/2024 08:44 AM    Hemoglobin A1C 5.2 01/31/2024 01:30 PM    Hemoglobin A1C 5.0 " "10/23/2023 09:40 AM    White Blood Cell Count 8.6 08/19/2024 08:31 AM    White Blood Cell Count 6.9 05/03/2024 08:44 AM    White Blood Cell Count 6.6 10/23/2023 09:40 AM    Hemoglobin 13.6 08/19/2024 08:31 AM    Hemoglobin 13.9 05/03/2024 08:44 AM    Hemoglobin 12.5 10/23/2023 09:40 AM    HCT 41.2 08/19/2024 08:31 AM    HCT 41.5 05/03/2024 08:44 AM    HCT 38.0 10/23/2023 09:40 AM    MCV 86 08/19/2024 08:31 AM    MCV 89 05/03/2024 08:44 AM    MCV 86 10/23/2023 09:40 AM    Platelet Count 341 08/19/2024 08:31 AM    Platelet Count 265 05/03/2024 08:44 AM    Platelet Count 255 10/23/2023 09:40 AM    BUN 13 08/19/2024 08:31 AM    BUN 11 05/03/2024 08:44 AM    BUN 10 10/23/2023 09:40 AM    Potassium 4.1 08/19/2024 08:31 AM    Potassium 4.2 05/03/2024 08:44 AM    Potassium 4.1 10/23/2023 09:40 AM    Chloride 100 08/19/2024 08:31 AM    Chloride 102 05/03/2024 08:44 AM    Chloride 107 (H) 10/23/2023 09:40 AM    CO2 24 08/19/2024 08:31 AM    CO2 23 05/03/2024 08:44 AM    CO2 24 10/23/2023 09:40 AM    Creatinine 0.92 08/19/2024 08:31 AM    Creatinine 0.89 05/03/2024 08:44 AM    Creatinine 0.71 10/23/2023 09:40 AM    AST 18 08/19/2024 08:31 AM    AST 16 05/03/2024 08:44 AM    AST 15 10/23/2023 09:40 AM    ALT 13 08/19/2024 08:31 AM    ALT 13 05/03/2024 08:44 AM    ALT 15 10/23/2023 09:40 AM    Protein, Total 7.4 08/19/2024 08:31 AM    Protein, Total 7.1 05/03/2024 08:44 AM    Protein, Total 6.9 10/23/2023 09:40 AM    Albumin 3.9 08/19/2024 08:31 AM    Albumin 3.8 (L) 05/03/2024 08:44 AM    Albumin 3.8 (L) 10/23/2023 09:40 AM    Globulin, Total 3.5 08/19/2024 08:31 AM    Globulin, Total 3.3 05/03/2024 08:44 AM    Globulin, Total 3.1 10/23/2023 09:40 AM    HDL 65 05/03/2024 08:44 AM    HDL 43 10/23/2023 09:40 AM    Triglycerides 69 05/03/2024 08:44 AM    Triglycerides 95 10/23/2023 09:40 AM     Portions of the record may have been created with voice recognition software. Occasional wrong word or \"sound a like\" substitutions may " have occurred due to the inherent limitations of voice recognition software. Read the chart carefully and recognize, using context, where substitutions have occurred.

## 2024-08-21 NOTE — PATIENT INSTRUCTIONS
Be mindful of diet.     Stay active and stay hydrated.     Continue the DEX COM.     We did not make any pump changes at this time but switch to the BETY-Steel sets.     Update your Tandem pump to utilize the DEXCOM G7.     KEEP UP THE GREAT WORK !!!     Take levothyroxine 150 - 5 days a week with no tablet 2 days a week.     Take your levothyroxine consistently, in the morning, on an empty stomach with only water and wait 30 min before eating and 4 hr before taking multivitamins or supplements.     Obtain lab work prior to next visit.

## 2024-08-22 ENCOUNTER — TELEPHONE (OUTPATIENT)
Age: 34
End: 2024-08-22

## 2024-08-22 NOTE — TELEPHONE ENCOUNTER
Patient needs an appointment to help her switch from the G6 to the G7. She would like to schedule with Christine.    I attempted to reach diabetes education, unsuccessful.

## 2024-08-27 ENCOUNTER — OFFICE VISIT (OUTPATIENT)
Dept: DIABETES SERVICES | Facility: HOSPITAL | Age: 34
End: 2024-08-27
Payer: COMMERCIAL

## 2024-08-27 DIAGNOSIS — E10.3493: Primary | ICD-10-CM

## 2024-08-27 PROCEDURE — 98960 EDU&TRN PT SELF-MGMT NQHP 1: CPT | Performed by: DIETITIAN, REGISTERED

## 2024-08-27 NOTE — PROGRESS NOTES
"Diabetes DSME    HPI: Met with Fang Farrell for DSME Follow-up visit.  Here today for assistance with her pump.  She has been struggling with getting occlusions when taking boluses.  It only happens during boluses and the basal rate continues to be flowing as normal it appears.  She has changed site locations, change sites and cartridges, and it still continues to happen.  Therefore we started her on the true steel infusion set today to see if this improves things.  If she is still having occlusions with true steel and and being in a new territory on her body, there must be something wrong with the pump.  I did reach out to the pump company for further guidance as well.  She has a lot of anxiety she states over new things with her diabetes management and just felt more comfortable with us trying the set together today.  She placed 1 today, did not connected to her pump, but just wants to wear it connected in to make sure it feels okay and works fine.  She will use for infusion sets that her endocrinologist gave her at the last office visit to see if that helps.      Ht Readings from Last 1 Encounters:   08/21/24 5' 2\" (1.575 m)     Wt Readings from Last 3 Encounters:   08/21/24 79.2 kg (174 lb 9.6 oz)   06/12/24 80.9 kg (178 lb 6.4 oz)   05/31/24 81.6 kg (180 lb)        There is no height or weight on file to calculate BMI.     Lab Results   Component Value Date    HGBA1C 5.4 08/19/2024    HGBA1C 5.5 05/03/2024    HGBA1C 5.2 01/31/2024       No results found for: \"CHOL\"  Lab Results   Component Value Date    HDL 65 05/03/2024    HDL 43 10/23/2023    HDL 50 12/21/2022     Lab Results   Component Value Date    LDLCALC 139 (H) 05/03/2024    LDLCALC 82 10/23/2023    LDLCALC 89 12/21/2022     Lab Results   Component Value Date    TRIG 69 05/03/2024    TRIG 95 10/23/2023    TRIG 62 12/21/2022     Lab Results   Component Value Date    CHOLHDL 3.3 10/23/2023    CHOLHDL 3.0 12/21/2022    CHOLHDL 3.5 06/21/2022     No results " "found for: \"MICROALBUR\", \"[id number]\"    Diabetes Education Record  Fang received the following handouts: none       Patient response to instruction    Comprehensiongood  Motivationgood  Expected Compliancegood    Thank you for referring your patient to [facility], it was a pleasure working with them today. Please feel free to call with any questions or concerns.    Christine Zimmerman, RD  [address] [phone]    "

## 2024-09-17 DIAGNOSIS — E10.40 TYPE 1 DIABETES MELLITUS WITH DIABETIC NEUROPATHY (HCC): ICD-10-CM

## 2024-09-17 DIAGNOSIS — E03.9 ACQUIRED HYPOTHYROIDISM: ICD-10-CM

## 2024-09-17 NOTE — TELEPHONE ENCOUNTER
Reason for call:   [x] Refill   [] Prior Auth  [] Other:     Office: [facility]  [] PCP/Provider -   [x] Specialty/Provider - Endocrinology/ Angelo Dhaliwal     Medication: insulin lispro (HumALOG/ADMELOG), levothyroxine     Dose/Frequency: 100 units/ ml injection, 150 mcg/  USE VIA INSULIN PUMP UP  UNITS DAILY, daily     Quantity: 30 ml, 30 day supply     Pharmacy: [facility] in [place]     Does the patient have enough for 3 days?   [x] Yes   [] No - Send as HP to POD

## 2024-09-18 RX ORDER — LEVOTHYROXINE SODIUM 150 UG/1
150 TABLET ORAL DAILY
Qty: 30 TABLET | Refills: 5 | Status: SHIPPED | OUTPATIENT
Start: 2024-09-18

## 2024-09-18 RX ORDER — INSULIN LISPRO 100 [IU]/ML
100 INJECTION, SOLUTION INTRAVENOUS; SUBCUTANEOUS DAILY
Qty: 30 ML | Refills: 5 | Status: SHIPPED | OUTPATIENT
Start: 2024-09-18

## 2024-10-10 LAB
EXTERNAL HEPATITIS B SURFACE ANTIGEN: NEGATIVE
EXTERNAL RUBELLA IGG QUANTITATION: NORMAL

## 2024-10-12 DIAGNOSIS — E03.9 ACQUIRED HYPOTHYROIDISM: ICD-10-CM

## 2024-10-14 RX ORDER — LEVOTHYROXINE SODIUM 150 UG/1
150 TABLET ORAL DAILY
Qty: 90 TABLET | Refills: 1 | Status: SHIPPED | OUTPATIENT
Start: 2024-10-14

## 2024-11-26 ENCOUNTER — TELEMEDICINE (OUTPATIENT)
Dept: ENDOCRINOLOGY | Facility: HOSPITAL | Age: 34
End: 2024-11-26
Payer: COMMERCIAL

## 2024-11-26 ENCOUNTER — RESULTS FOLLOW-UP (OUTPATIENT)
Dept: ENDOCRINOLOGY | Facility: HOSPITAL | Age: 34
End: 2024-11-26

## 2024-11-26 DIAGNOSIS — E10.9 TYPE 1 DIABETES MELLITUS WITHOUT COMPLICATION (HCC): ICD-10-CM

## 2024-11-26 DIAGNOSIS — O99.281 THYROID DISEASE DURING PREGNANCY IN FIRST TRIMESTER: ICD-10-CM

## 2024-11-26 DIAGNOSIS — E07.9 THYROID DISEASE DURING PREGNANCY IN FIRST TRIMESTER: ICD-10-CM

## 2024-11-26 DIAGNOSIS — E10.3493: ICD-10-CM

## 2024-11-26 DIAGNOSIS — E03.9 HYPOTHYROIDISM, UNSPECIFIED TYPE: ICD-10-CM

## 2024-11-26 DIAGNOSIS — E10.3291 MILD NONPROLIFERATIVE DIABETIC RETINOPATHY OF RIGHT EYE WITHOUT MACULAR EDEMA ASSOCIATED WITH TYPE 1 DIABETES MELLITUS (HCC): ICD-10-CM

## 2024-11-26 DIAGNOSIS — E10.3212 MILD NONPROLIFERATIVE DIABETIC RETINOPATHY OF LEFT EYE WITH MACULAR EDEMA ASSOCIATED WITH TYPE 1 DIABETES MELLITUS (HCC): ICD-10-CM

## 2024-11-26 DIAGNOSIS — E03.9 ACQUIRED HYPOTHYROIDISM: ICD-10-CM

## 2024-11-26 DIAGNOSIS — E10.40 TYPE 1 DIABETES MELLITUS WITH DIABETIC NEUROPATHY (HCC): Primary | ICD-10-CM

## 2024-11-26 LAB
ALBUMIN SERPL-MCNC: 3.7 G/DL (ref 3.9–4.9)
ALBUMIN/CREAT UR: 5 MG/G CREAT (ref 0–29)
ALP SERPL-CCNC: 51 IU/L (ref 44–121)
ALT SERPL-CCNC: 10 IU/L (ref 0–32)
AST SERPL-CCNC: 17 IU/L (ref 0–40)
BASOPHILS # BLD AUTO: 0.1 X10E3/UL (ref 0–0.2)
BASOPHILS NFR BLD AUTO: 1 %
BILIRUB SERPL-MCNC: 0.6 MG/DL (ref 0–1.2)
BUN SERPL-MCNC: 9 MG/DL (ref 6–20)
BUN/CREAT SERPL: 10 (ref 9–23)
CALCIUM SERPL-MCNC: 9 MG/DL (ref 8.7–10.2)
CHLORIDE SERPL-SCNC: 100 MMOL/L (ref 96–106)
CHOLEST SERPL-MCNC: 171 MG/DL (ref 100–199)
CO2 SERPL-SCNC: 21 MMOL/L (ref 20–29)
CREAT SERPL-MCNC: 0.87 MG/DL (ref 0.57–1)
CREAT UR-MCNC: 215.5 MG/DL
EGFR: 88 ML/MIN/1.73
EOSINOPHIL # BLD AUTO: 0.8 X10E3/UL (ref 0–0.4)
EOSINOPHIL NFR BLD AUTO: 7 %
ERYTHROCYTE [DISTWIDTH] IN BLOOD BY AUTOMATED COUNT: 12.7 % (ref 11.7–15.4)
GLOBULIN SER-MCNC: 3.2 G/DL (ref 1.5–4.5)
GLUCOSE SERPL-MCNC: 108 MG/DL (ref 70–99)
HBA1C MFR BLD: 5.7 % (ref 4.8–5.6)
HCT VFR BLD AUTO: 37.7 % (ref 34–46.6)
HDLC SERPL-MCNC: 46 MG/DL
HGB BLD-MCNC: 13.4 G/DL (ref 11.1–15.9)
IMM GRANULOCYTES # BLD: 0 X10E3/UL (ref 0–0.1)
IMM GRANULOCYTES NFR BLD: 0 %
LDLC SERPL CALC-MCNC: 105 MG/DL (ref 0–99)
LYMPHOCYTES # BLD AUTO: 4.2 X10E3/UL (ref 0.7–3.1)
LYMPHOCYTES NFR BLD AUTO: 36 %
MCH RBC QN AUTO: 31.8 PG (ref 26.6–33)
MCHC RBC AUTO-ENTMCNC: 35.5 G/DL (ref 31.5–35.7)
MCV RBC AUTO: 90 FL (ref 79–97)
MICROALBUMIN UR-MCNC: 11.7 UG/ML
MONOCYTES # BLD AUTO: 1.1 X10E3/UL (ref 0.1–0.9)
MONOCYTES NFR BLD AUTO: 9 %
NEUTROPHILS # BLD AUTO: 5.3 X10E3/UL (ref 1.4–7)
NEUTROPHILS NFR BLD AUTO: 47 %
PLATELET # BLD AUTO: 360 X10E3/UL (ref 150–450)
POTASSIUM SERPL-SCNC: 4.1 MMOL/L (ref 3.5–5.2)
PROT SERPL-MCNC: 6.9 G/DL (ref 6–8.5)
RBC # BLD AUTO: 4.21 X10E6/UL (ref 3.77–5.28)
SL AMB VLDL CHOLESTEROL CALC: 20 MG/DL (ref 5–40)
SODIUM SERPL-SCNC: 137 MMOL/L (ref 134–144)
T4 FREE SERPL-MCNC: 1.37 NG/DL (ref 0.82–1.77)
TRIGL SERPL-MCNC: 111 MG/DL (ref 0–149)
TSH SERPL DL<=0.005 MIU/L-ACNC: 1.39 UIU/ML (ref 0.45–4.5)
WBC # BLD AUTO: 11.5 X10E3/UL (ref 3.4–10.8)

## 2024-11-26 PROCEDURE — 99214 OFFICE O/P EST MOD 30 MIN: CPT | Performed by: NURSE PRACTITIONER

## 2024-11-26 NOTE — ASSESSMENT & PLAN NOTE
Lab Results   Component Value Date    HGBA1C 5.7 (H) 11/25/2024       Orders:    CBC and differential; Future    Comprehensive metabolic panel; Future    Hemoglobin A1C; Future

## 2024-11-26 NOTE — PROGRESS NOTES
Virtual Regular Visit  Name: Fang Farrell      : 1990      MRN: [id number]  Encounter Provider: LAKESHA Carlson  Encounter Date: 2024   Encounter department: [facility]      Verification of patient location:  Patient is located at Home in the following state in which I hold an active license [place] :  Assessment & Plan  Type 1 diabetes mellitus with diabetic neuropathy (HCC)    Lab Results   Component Value Date    HGBA1C 5.7 (H) 2024       Orders:    CBC and differential; Future    Comprehensive metabolic panel; Future    Hemoglobin A1C; Future    Ambulatory Referral to Maternal Fetal Medicine; Future    Type 1 diabetes 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, bilateral (HCC)    Lab Results   Component Value Date    HGBA1C 5.7 (H) 2024       Orders:    CBC and differential; Future    Comprehensive metabolic panel; Future    Hemoglobin A1C; Future    Acquired hypothyroidism    Orders:    CBC and differential; Future    Comprehensive metabolic panel; Future    Hemoglobin A1C; Future    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 without macular edema associated with type 1 diabetes mellitus (HCC)    Lab Results   Component Value Date    HGBA1C 5.7 (H) 2024       Orders:    CBC and differential; Future    Comprehensive metabolic panel; Future    Hemoglobin A1C; Future    Mild nonproliferative diabetic retinopathy of left eye with macular edema associated with type 1 diabetes mellitus (HCC)    Lab Results   Component Value Date    HGBA1C 5.7 (H) 2024       Orders:    CBC and differential; Future    Comprehensive metabolic panel; Future    Hemoglobin A1C; Future    Hypothyroidism, unspecified type    Orders:    CBC and differential; Future    Comprehensive metabolic panel; Future    Hemoglobin A1C; Future    Hypothyroidism affecting pregnancy in second trimester         Type 1  diabetes mellitus without complication (HCC)    Lab Results   Component Value Date    HGBA1C 5.7 (H) 11/25/2024       Orders:    CBC and differential; Future    Comprehensive metabolic panel; Future    Hemoglobin A1C; Future    Encounter provider LAKESHA Carlson    The patient was identified by name and date of birth. Fang Farrell was informed that this is a telemedicine visit and that the visit is being conducted through the Epic Embedded platform. She agrees to proceed..  My office door was closed. No one else was in the room.  She acknowledged consent and understanding of privacy and security of the video platform. The patient has agreed to participate and understands they can discontinue the visit at any time.    Patient is aware this is a billable service.     History of Present Illness     33 y.o. year old female with history of hypothyroidism and type 1 diabetes with retinopathy for follow-up visit.     She has hypothyroidism and takes levothyroxine 150 mcg 1 tablet - daily. She is currently 1 year postpartum. her most resent TSH from August 19, 2024 is 2.410 with a Free T4 of 1.73.     She has type 1 diabetes diagnosed 33 years ago.  She is currently pregnant on an insulin pump utilizing a t:slim to insulin pump with Dexcom G6 continuous glucose monitoring system integrated with her insulin pump.  Recent hemoglobin A1c from August 19, 2024 is 5.4.  Last diabetic eye exam was in January 2024 and last diabetic foot exam was January 31, 2024.     She utilizes the Tandem T-Slim X 2 insulin pump with Dexcom G6 continuous glucose monitoring system integrated with her insulin pump.  She is currently not in control IQ.  Basal rates:  12 AM to 1 AM 0.35 units/h, 1 AM to 3 AM 0.7 units/h, 3 AM to 6 AM 1 unit/h, 6 AM to 9:15 AM 0.9 units/h, 9:15 AM to 11:45 AM 1 unit/h, 11:45 AM to 3:30 PM 0.7 units/h, 3:30 PM to 5:30 PM 0.85 units/h, 5:30 PM to 7:30 PM 0.88 units/h, 7:30 PM to 10 PM 1.1 units/h, 10 PM to 12 AM  0.65 units/h.  Bolus rates:  12 AM to 6 AM 1 unit per 5 g carbohydrate, 6 AM to 11:45 AM 1 unit per 4 g carbohydrate, 11:45 AM to 5:30 PM 1 unit per 5 g carbohydrate, 5:30 PM to 7:30 PM 1 unit per 4.5 g carbohydrate, 7:30 PM to 12 AM 1 unit per 4 g carbohydrate.  Insulin sensitivity factor:   12 AM to 1 AM 1 unit per 40 blood sugar points for target blood glucose of 100, 1 AM to 6 AM 1 unit per 45 blood sugar points for target blood glucose of 95, 6 AM to 5:30 PM 1 unit per 40 blood sugar points for target blood glucose of 100,  5:30 PM to 7:30 PM 1 unit per 37 blood sugar points for target blood glucose of 100, and 7:30 PM to 12 AM 1 unit per 45 blood sugar points for target blood glucose of 100.  Insulin action:  4.0 hours.    HPI  Review of Systems   Constitutional: Negative.  Negative for chills, fatigue and fever.   HENT: Negative.  Negative for trouble swallowing and voice change.    Eyes: Negative.  Negative for photophobia, pain, discharge, redness, itching and visual disturbance.   Respiratory: Negative.  Negative for chest tightness and shortness of breath.    Cardiovascular: Negative.  Negative for chest pain.   Gastrointestinal: Negative.  Negative for abdominal pain, constipation, diarrhea and vomiting.   Endocrine: Negative for cold intolerance, heat intolerance, polydipsia, polyphagia and polyuria.   Genitourinary: Negative.    Musculoskeletal: Negative.    Skin: Negative.    Allergic/Immunologic: Negative.    Neurological: Negative.  Negative for dizziness, syncope, light-headedness and headaches.   Hematological: Negative.    Psychiatric/Behavioral: Negative.     All other systems reviewed and are negative.      Objective   There were no vitals taken for this visit.    Physical Exam    Physical Exam   Constitutional: She is oriented to person, place, and time. She appears well-developed and well-nourished. No distress.   HENT:   Head: Normocephalic and atraumatic.   Neck: Normal range of motion.    Pulmonary/Chest: Effort normal.   Musculoskeletal: Normal range of motion.   Neurological: She is alert and oriented to person, place, and time.   Skin: She is not diaphoretic.   Psychiatric: She has a normal mood and affect. Her behavior is normal.       Plan:  1.  Hypothyroidism.  Most recent TSH is in the normal range at 2.410.  For now, she will continue levothyroxine 150 mcg - 1 tablet daily for 5 days of the week with no tablet on Saturday or Sunday.  She will focus on consistency.  Check TSH and free T4 in prior to next visit.      2.  Type 1 diabetes.  Recent hemoglobin A1c is 5.4.  Overall, she is relatively well-controlled when reviewing her Dexcom continuous glucose monitor.  For now, continue pump current settings.  After some discussion, I suspect that she is having some issues with absorption of her insulin through her auto soft XC infusion sets.  I provided her with some true steel sets to trial.  She will work to upgrade her tandem X to pump with the latest software to utilize a Dexcom G7.  She would like to meet with diabetes education for the initial start of her Dexcom G7.  She will continue to utilize the pump and sensor and send a report to the office in 2 weeks for review.  Hemoglobin A1c prior to next visit    Visit Time  Total Visit Duration: 30 minutes

## 2024-11-27 ENCOUNTER — TELEPHONE (OUTPATIENT)
Age: 34
End: 2024-11-27

## 2024-12-13 ENCOUNTER — ULTRASOUND (OUTPATIENT)
Age: 34
End: 2024-12-13
Payer: COMMERCIAL

## 2024-12-13 VITALS — BODY MASS INDEX: 32.26 KG/M2 | SYSTOLIC BLOOD PRESSURE: 130 MMHG | DIASTOLIC BLOOD PRESSURE: 80 MMHG | WEIGHT: 176.4 LBS

## 2024-12-13 DIAGNOSIS — O24.011: ICD-10-CM

## 2024-12-13 DIAGNOSIS — E10.319: ICD-10-CM

## 2024-12-13 DIAGNOSIS — N91.1 SECONDARY AMENORRHEA: Primary | ICD-10-CM

## 2024-12-13 PROCEDURE — 99214 OFFICE O/P EST MOD 30 MIN: CPT | Performed by: OBSTETRICS & GYNECOLOGY

## 2024-12-13 PROCEDURE — 76817 TRANSVAGINAL US OBSTETRIC: CPT | Performed by: OBSTETRICS & GYNECOLOGY

## 2024-12-13 RX ORDER — PROGESTERONE 200 MG/1
CAPSULE ORAL
COMMUNITY
Start: 2024-12-08

## 2024-12-13 NOTE — PROGRESS NOTES
Ultrasound Probe Disinfection    A transvaginal ultrasound was performed.   Prior to use, disinfection was performed with High Level Disinfection Process (ReTargeteron)  Probe serial number  [id number]  was used    Eulalia Strange MA  12/13/24  11:41 AM

## 2024-12-15 NOTE — PROGRESS NOTES
Assessment/Plan:     1. Secondary amenorrhea (Primary)  SIUP @ 8 weeks 1 day by ovulation induction/IUI     - AMB US OB < 14 weeks single or first gestation level 1  - has exit interview and repeat sono with [facility] next week    2. Pre-existing type 1 diabetes mellitus with retinopathy during pregnancy in first trimester (HCC)    - has seen Endocrinology  - M referral placed  - last HgA1C 2024 5.7        Subjective      Fang Farrell is a 34 y.o.  LMP 10/15/2024 who presents for viability sono s/p pregnancy achieved via ovulation induction and IUI.  She is 8 weeks 1 day by [facility] dating today.  Not on support hormones.  No VB.  No n/v.  No records available for review.    Cycle length: regular .  Pregnancy testing:  with [facility] .  Pregnancy imaging:  with [facility]; none available to review .  Blood type: A positive.  Other lab results:  HgA1C 5.7 .    The following portions of the patient's history were reviewed and updated as appropriate: allergies, current medications, past family history, past medical history, past social history, past surgical history, and problem list.    Review of Systems  Pertinent items are noted in HPI.     Objective      /80   Wt 80 kg (176 lb 6.4 oz)   LMP 10/15/2024   BMI 32.26 kg/m²     General: alert and oriented, in no acute distress   Heart: regular rate and rhythm   Lungs: Effort normal   Abdomen: soft, non-tender, without masses or organomegaly   Vulva: normal                     Imaging    See sono report under OB Procedures tab

## 2024-12-31 NOTE — PROGRESS NOTES
The patient was identified by name and date of birth and was informed that this is a virtual visit being conducted through a secure, HIPAA-compliant platform. I am in a private office space with the door closed. Patient acknowledged understanding of privacy.  She agrees to proceed and is aware that she may discontinue the visit at any time.    OB INTAKE INTERVIEW January 3, 2025    Patient is 34 y.o. who presents for OB intake at 11w1d.  She is accompanied by anyone during this encounter.  The father of her baby (Sivakumar Farrell) is involved in the pregnancy.      Patient's last menstrual period was 10/15/2024.  Ultrasound: Measured 7 weeks 5 days on 2024   Estimated Date of Delivery: 25 confirmed by dating ultrasound.   Conceived via IUI date 10/31/24    Signs/Symptoms of Pregnancy  Current pregnancy symptoms: fatigue, nausea, and frequent urination  Headaches: no  Cramping: no  Spotting: no  PICA cravings: no    Diabetes:  initial BMI 32 - virtual OB intake  If patient has 1 or more, please order early 1 hour GTT  History of GDM: no - pre-existing type 1 DM  w/insulin pump  BMI >35 no  History of PCOS or current metformin use: no  History of LGA/macrosomic infant (4000g/9lbs): no    If patient has 2 or more, please order early 1 hour GTT  BMI>30 YES - initial BMI 32  AMA: YES  First degree relative with type 2 diabetes: no  History of chronic HTN, hyperlipidemia, elevated A1C: no  High risk race (, , ,  or ): no    Hypertension: if you answer yes to any of the following, please order baseline preeclampsia labs (cbc, comprehensive metabolic panel, urine protein creatinine ratio, uric acid)  History of of chronic HTN: no  History of gestational HTN: YES - pt denies, OP note from prior pregnancy states IOL for gHTN  History of preeclampsia, eclampsia, or HELLP syndrome: no  History of diabetes: YES - pre-existing type 1 DM w/insulin pump - sees  CRISTINA Zaman  History of lupus,sjogrens syndrome, kidney disease no    Thyroid: if yes order TSH with reflex T4  History of thyroid disease: YES - currently medicated - managed by CRISTINA Zaman    Bleeding Disorder or Hx of DVT - patient or first degree relative with history of. Order the following if not done previously.   (Factor V, antithrombin III, prothrombin gene mutation, protein C and S Ag, lupus anticoagulant, anticardiolipin, beta-2 glycoprotein):   no    OB/GYN:  History of abnormal pap smear: no       Date of last pap smear: Not on file  History of HPV: no  History of Herpes/HSV: YES - HSV type 1 - states she used suppression at the end of her prior pregnancy  History of other STI: (gonorrhea, chlamydia, trich) no  History of prior : no  History of prior : YES - failed IOL  History of  delivery prior to 36 weeks 6 days: no  History of blood transfusion: no  Ok for blood transfusion: YES    Substance screening-   History of tobacco use no  Currently using tobacco no  Substance Use Screen Level:  No Risk    MRSA Screening:   Does the pt have a hx of MRSA? no    Mental Health:  Hx of/or current dx of depression: no  Hx of/or current dx of anxiety: YES  Medications: YES medicated w/Lexapro in the past  EPDS Screen:  Negative / score: 4    Dental Health:  Patient has seen a dentist in the past 6 months: YES    Immunizations:  Influenza vaccine given this season: YES - received in Oct 2024  Discussed Tdap vaccine:  YES  Discussed COVID Vaccine:  YES - had in the past  History of Varicella or Vaccination had chicken pox    Genetic/MFM:  Do you or your partner have a history of any of the following in yourselves or first degree relatives?  Cystic fibrosis: no  Spinal muscular atrophy: no  Hemoglobinopathy/Sickle Cell/Thalassemia: no  Fragile X Intellectual Disability: no    If yes, discuss Carrier Screening and recommend consultation with MFM/Genetic Counseling and place specific MFM Referral  placed    Discussed Carrier Screening being completed once in a lifetime as a standard of care lab test. Place orders for Cystic Fibrosis Gene Test ([id number]) and Spinal Muscular Atrophy DNA ([id number]).  Patient was informed that prior authorization needs to be completed for these tests and this may take 7-10 business days.  Patient does not desire testing for Cystic Fibrosis and Spinal Muscular Atrophy.    Appointment for Nuchal Translucency Ultrasound at [facility] is scheduled for 1/17/25.    Interview education:   [facility] Pregnancy Essentials Book reviewed, discussed and attached to their AVS: YES     Nurse/Family Partnership-patient may qualify NO; referral placed NO     Prenatal lab work scripts: YES    Extra labs ordered: CMP, Protein/creatinine ratio urine, and Uric Acid    Aspirin/Preeclampsia Screen    Risk Level Risk Factor Recommendation   LOW Prior Uncomplicated full-term delivery NO - hx gHTN No Aspirin recommendation        MODERATE Nulliparity no Recommend low-dose aspirin if     BMI>30 YES - initial BMI 32 2 or more moderate risk factors    Family History Preeclampsia (mother/sister) no     35yr old or greater YES     Black Race, Concern for SDOH/Low Socioeconomic no     IVF Pregnancy  no     Personal History Risks (low birth weight, prior adverse preg outcome, >10yr preg interval) YES - hx gHTN         HIGH History of Preeclampsia no Recommend low-dose aspirin if     Multifetal gestation no 1 or more high risk factors    Chronic HTN no     Type 1 or 2 Diabetes YES - type 1     Renal Disease no     Autoimmune Disease  no      Contraindications to ASA therapy:  NSAID/ ASA allergy: no  Nasal polyps: no  Asthma with history of ASA induced bronchospasm: no    Relative contraindications:  History of GI bleed: no  Active peptic ulcer disease: no  Severe hepatic dysfunction: no    Patient does meet recommendation to take ASA 162mg during this pregnancy from 12-36 wks to lower her risk of preeclampsia.   Instructions given and patient verbalized understanding.    The patient has a history now or in prior pregnancy notable for:  AMA, type 1 DM - uses insulin pump/dexcom, hypothyroid, anxiety, pregnancy achieved via IUI, hx C/S, HSV1 - will need suppression end of pregnancy       Details that I feel the provider should be aware of: Fang had her virtual OB Intake visit today, Hx obtained, Fang is a transfer from [place], conceived via IUI.  She has c/o nausea, discussed B6/Unisom dosing, also reviewed avoiding an empty belly.  Discussed need for 2 tabs LDASA therapy d/t elevated pre-e risk, pt verbalized understanding.  Will start after discussing w/provider at next PNV. Reviewed medications safe to take in pregnancy.  [facility] Essentials packet/link reviewed. [facility] Baby and Me classes reviewed and how to register for classes.  Pt aware that referral will be placed today for DIP d/t her type 1 DM.  Pt states she sees CRISTINA Zaman, states they also manage her thyroid and they are aware that she is pregnant, pt states that she desires elective repeat C/S     PN1 visit scheduled. The patient was oriented to our practice, the navigator role, reviewed delivering physicians and [facility] for delivery. All questions were answered.    Interviewed by: Tamara Sánchez RN

## 2025-01-03 ENCOUNTER — INITIAL PRENATAL (OUTPATIENT)
Age: 35
End: 2025-01-03

## 2025-01-03 ENCOUNTER — TELEPHONE (OUTPATIENT)
Age: 35
End: 2025-01-03

## 2025-01-03 DIAGNOSIS — E03.9 HYPOTHYROIDISM AFFECTING PREGNANCY IN FIRST TRIMESTER: ICD-10-CM

## 2025-01-03 DIAGNOSIS — O09.521 ADVANCED MATERNAL AGE IN MULTIGRAVIDA, FIRST TRIMESTER: ICD-10-CM

## 2025-01-03 DIAGNOSIS — O99.210 OBESITY IN PREGNANCY: ICD-10-CM

## 2025-01-03 DIAGNOSIS — O24.011 TYPE 1 DIABETES MELLITUS AFFECTING PREGNANCY IN FIRST TRIMESTER, ANTEPARTUM: ICD-10-CM

## 2025-01-03 DIAGNOSIS — Z34.81 MULTIGRAVIDA IN FIRST TRIMESTER: Primary | ICD-10-CM

## 2025-01-03 DIAGNOSIS — O99.281 HYPOTHYROIDISM AFFECTING PREGNANCY IN FIRST TRIMESTER: ICD-10-CM

## 2025-01-03 DIAGNOSIS — Z87.59 HISTORY OF GESTATIONAL HYPERTENSION: ICD-10-CM

## 2025-01-03 PROCEDURE — OBC

## 2025-01-03 NOTE — TELEPHONE ENCOUNTER
Spoke with patient said she had a baby 17 months ago and has pre diabetic. Is declining the 2 classes is open to scheduling a combo class with Nia. Can someone please call her to assist with scheduling.    Thank you  Pratibha

## 2025-01-03 NOTE — PATIENT INSTRUCTIONS
Congratulations on your pregnancy!  We thank you for allowing us to participate in your care.    NEXT STEPS    Go to the lab to have your prenatal bloodwork competed if you have not already done so.  There is a listing of [facility] and locations in your prenatal folder. You may also visit [email or web address] or call [phone].   Please be aware that some insurance companies may require you to go to a specific lab (ex. [facility] or [facility]). You can verify this by contacting your insurance company.   If you have decided to be screened for CF and SMA genetic testing, these tests require prior authorization and scheduling.  Prior Authorization is not a guarantee of payment. There may be out of pocket expenses that includes copays, deductibles and or coinsurance for your individual plan.  Please call [phone] if our team has not contacted you in 7 business days.  Please have your blood work completed prior to you next prenatal visit.    If you have decided to have genetic testing done at [facility], that will be scheduled by [facility]. You may have already scheduled this appointment.  If not, please call their office to schedule this appointment.  Based on the referral placed by our office, they will know how to schedule you appropriately.    Contact information for [facility] is located in your prenatal folder. The main phone number to their office is [phone].     Return to our office for your first routine prenatal visit.     Warning Signs During Pregnancy - If you experience any problems or concerns, call the office directly.  The list below includes warning signs your providers would like you to be aware of.  If you experience any of these at any time during your pregnancy, please call us as soon as possible.    Vaginal bleeding   Sharp abdominal pain that does not go away   Fever (more than 100.4?F and is not relieved with Tylenol)   Persistent vomiting lasting greater than 24  hours   Chest pain/Shortness of breath   Pain or burning when you urinate     Call the OFFICE [phone] for any questions/emergencies.  At night or on the weekend, calls go through a triage service, please indicate it is an emergency and the DOCTOR on call will be paged.    Remember to only use MyChart for non-urgent concerns or questions.    Our doctors deliver at [facility] in [place]. The address is provided below.     [facility]  [address] [place]  [address]     Please click on the links below to review our Pregnancy Essential Guide.    [facility] Pregnancy Essentials Guide  [facility] Women's Health ([email or web address])     [facility] - Virtual Tour ([employer])      To learn more about the Prenatal Education classes that [facility] offers, click the link below.  Prenatal Education Classes    Click on the link below to review [facility] Lab locations.  [facility] Lab Locations    [facility] resource  [employer] is a tool to connect you to community resources you may need.      Thank you,   Tamara PATIÑO, RN  OB Nurse Navigator

## 2025-01-08 ENCOUNTER — INITIAL PRENATAL (OUTPATIENT)
Age: 35
End: 2025-01-08
Payer: COMMERCIAL

## 2025-01-08 VITALS — DIASTOLIC BLOOD PRESSURE: 82 MMHG | BODY MASS INDEX: 32.59 KG/M2 | SYSTOLIC BLOOD PRESSURE: 122 MMHG | WEIGHT: 178.2 LBS

## 2025-01-08 DIAGNOSIS — Z11.3 SCREENING FOR STD (SEXUALLY TRANSMITTED DISEASE): ICD-10-CM

## 2025-01-08 DIAGNOSIS — Z34.81 MULTIGRAVIDA IN FIRST TRIMESTER: ICD-10-CM

## 2025-01-08 DIAGNOSIS — Z34.81 PRENATAL CARE, SUBSEQUENT PREGNANCY, FIRST TRIMESTER: Primary | ICD-10-CM

## 2025-01-08 DIAGNOSIS — E10.3493: ICD-10-CM

## 2025-01-08 LAB
SL AMB  POCT GLUCOSE, UA: NORMAL
SL AMB POCT URINE PROTEIN: NORMAL

## 2025-01-08 PROCEDURE — 81002 URINALYSIS NONAUTO W/O SCOPE: CPT | Performed by: OBSTETRICS & GYNECOLOGY

## 2025-01-08 PROCEDURE — G0145 SCR C/V CYTO,THINLAYER,RESCR: HCPCS | Performed by: OBSTETRICS & GYNECOLOGY

## 2025-01-08 PROCEDURE — G0476 HPV COMBO ASSAY CA SCREEN: HCPCS | Performed by: OBSTETRICS & GYNECOLOGY

## 2025-01-08 PROCEDURE — 87591 N.GONORRHOEAE DNA AMP PROB: CPT | Performed by: OBSTETRICS & GYNECOLOGY

## 2025-01-08 PROCEDURE — 99214 OFFICE O/P EST MOD 30 MIN: CPT | Performed by: OBSTETRICS & GYNECOLOGY

## 2025-01-08 PROCEDURE — 87491 CHLMYD TRACH DNA AMP PROBE: CPT | Performed by: OBSTETRICS & GYNECOLOGY

## 2025-01-08 RX ORDER — FOLIC ACID 1 MG/1
4 TABLET ORAL DAILY
Qty: 120 TABLET | Refills: 2 | Status: SHIPPED | OUTPATIENT
Start: 2025-01-08 | End: 2025-04-08

## 2025-01-08 NOTE — PROGRESS NOTES
Assessment:    Pregnancy at 12 and 1/7 weeks   Type I IDDM w/retinopathy  Previous  section  AMA  Hypothyroidism    Plan:    Initial labs drawn.  Prenatal vitamins.  Problem list reviewed and updated.  NT sono scheduled  Plan NIPT and AFP  Level II sono and fetal echo 20-22 weeks  Started ASA; folic acid renewed  Follow up in 4 weeks.  Greater than 50% of 30 min visit spent on counseling and coordination of care.         Luciano Farrell is being seen today for her first obstetrical visit.  This is a OI/IUI pregnancy. She is at 12w1d gestation. Her obstetrical history is significant for advanced maternal age, previous LTCS and type I IDDM. Relationship with FOB: spouse, living together. Patient does not intend to breast feed. Pregnancy history fully reviewed.    Menstrual History:  OB History          2    Para   1    Term   1       0    AB   0    Living   1         SAB   0    IAB   0    Ectopic   0    Multiple   0    Live Births   1                Patient's last menstrual period was 10/15/2024 (approximate).     Past Medical History:   Diagnosis Date    Anxiety     Diabetes mellitus (HCC)     type 1; since age 1; on dexcom (since summer 2022) and Tandem T-slim (<2mos) with excellent trend down in A1 (5.5!); had rough go in college years (did not take care of her diabetes); follows with LAKESHA Dhaliwal.    Elective procedure for unacceptable cosmetic appearance     smart lipo    Herpes     HSV1    Hypothyroidism     Varicella     had chicken pox       Family History   Problem Relation Age of Onset    Asthma Mother     Hypertension Mother     No Known Problems Father     Asthma Sister     No Known Problems Sister     No Known Problems Son     Dementia Maternal Grandmother     Alcohol abuse Maternal Grandfather     Emphysema Paternal Grandmother     Anuerysm Paternal Grandfather      Current Outpatient Medications on File Prior to Visit   Medication Sig    Continuous Glucose Sensor  (Dexcom G7 Sensor) Use 1 Device every 10 days    Continuous Glucose Transmitter (Dexcom G6 Transmitter) MISC CHANGE EVERY 90 DAYS AS DIRECTED    insulin lispro (HumALOG/ADMELOG) 100 units/mL injection Inject 100 Units under the skin daily    levothyroxine 150 mcg tablet TAKE 1 TABLET BY MOUTH EVERY DAY    Prenatal Vit-Fe Fumarate-FA (PRENATAL VITAMIN PO) Take by mouth    [DISCONTINUED] Progesterone 200 MG CAPS INSERT 1 CAPSULE(S) BY VAGINAL ROUTE TWICE DAILY WHEN DIRECTED     No current facility-administered medications on file prior to visit.     Allergies   Allergen Reactions    Gluten Meal - Food Allergy Diarrhea and GI Intolerance     Other reaction(s): vomiting    *Never officially tested; Still had gluten but avoids gluten if she has a upset stomach    Sulfamethoxazole      Other reaction(s): severe nausea/vomiting (2017)    Trimethoprim      Other reaction(s): severe nausea/vomiting (2017)     Past Surgical History:   Procedure Laterality Date    HAND SURGERY      LIPOSUCTION      smart lipo    [place]  DELIVERY ONLY N/A 2023    Procedure:  SECTION ();  Surgeon: Marisol Ma MD;  Location: [facility] ;  Service: Obstetrics    WISDOM TOOTH EXTRACTION         The following portions of the patient's history were reviewed and updated as appropriate: allergies, current medications, past family history, past medical history, past social history, past surgical history, and problem list.    Review of Systems  Pertinent items are noted in HPI.      Objective    Vitals:    25 1314   BP: 122/82        See prenatal physical

## 2025-01-08 NOTE — PROGRESS NOTES
Type 1 diabetic on insulin pump    SHOAIB: 25 =11w6d  IUI 10/31/24     1ST OB VISIT  Pn1 Labs: Not completed yet       P:  1  SHOAIB: 25 =11w6d  IUI 10/31/24   Last Annual Visit: 23  Last Pap: 20 NILM/HPV Neg    Pap IS indicated today  GC/CH collected today  Blood Type: A POSITIVE     Blue Folder given TODAY!  Type 1 diabetic w/ insulin pump.-has an apt with Diabetes pathways    Denies Vaginal bleeding or spotting.   Nausea is improving

## 2025-01-09 LAB
HPV HR 12 DNA CVX QL NAA+PROBE: NEGATIVE
HPV16 DNA CVX QL NAA+PROBE: NEGATIVE
HPV18 DNA CVX QL NAA+PROBE: NEGATIVE

## 2025-01-10 LAB
C TRACH DNA SPEC QL NAA+PROBE: NEGATIVE
N GONORRHOEA DNA SPEC QL NAA+PROBE: NEGATIVE

## 2025-01-13 ENCOUNTER — RESULTS FOLLOW-UP (OUTPATIENT)
Age: 35
End: 2025-01-13

## 2025-01-13 LAB
ALBUMIN SERPL-MCNC: 3.7 G/DL (ref 3.9–4.9)
ALP SERPL-CCNC: 47 IU/L (ref 44–121)
ALT SERPL-CCNC: 10 IU/L (ref 0–32)
AST SERPL-CCNC: 19 IU/L (ref 0–40)
BILIRUB SERPL-MCNC: 0.6 MG/DL (ref 0–1.2)
BUN SERPL-MCNC: 8 MG/DL (ref 6–20)
BUN/CREAT SERPL: 11 (ref 9–23)
CALCIUM SERPL-MCNC: 9.1 MG/DL (ref 8.7–10.2)
CHLORIDE SERPL-SCNC: 103 MMOL/L (ref 96–106)
CO2 SERPL-SCNC: 22 MMOL/L (ref 20–29)
CREAT SERPL-MCNC: 0.73 MG/DL (ref 0.57–1)
CREAT UR-MCNC: 247.2 MG/DL
EGFR: 109 ML/MIN/1.73
GLOBULIN SER-MCNC: 3 G/DL (ref 1.5–4.5)
GLUCOSE SERPL-MCNC: 96 MG/DL (ref 70–99)
LAB AP GYN PRIMARY INTERPRETATION: NORMAL
LAB AP LMP: NORMAL
Lab: NORMAL
POTASSIUM SERPL-SCNC: 4.3 MMOL/L (ref 3.5–5.2)
PROT SERPL-MCNC: 6.7 G/DL (ref 6–8.5)
PROT UR-MCNC: 18.2 MG/DL
PROT/CREAT UR: 74 MG/G CREAT (ref 0–200)
SODIUM SERPL-SCNC: 137 MMOL/L (ref 134–144)
URATE SERPL-MCNC: 3.3 MG/DL (ref 2.6–6.2)

## 2025-01-14 NOTE — PATIENT INSTRUCTIONS
Thank you for choosing us for your  care today.  If you have any questions about your ultrasound or care, please do not hesitate to contact us or your primary obstetrician.        Some general instructions for your pregnancy are:    Exercise: Aim for 150 minutes per week of regular exercise.  Walking is great!  Nutrition: Choose healthy sources of calcium, iron, and protein.  Avoid ultraprocessed foods and added sugar.  Learn about Preeclampsia: preeclampsia is a common, potentially serious high blood pressure complication in pregnancy.  A blood pressure of 140mmHg (systolic or top number) or 90mmHg (diastolic or bottom number) should be evaluated by your doctor.  Aspirin is sometimes prescribed in early pregnancy to prevent preeclampsia in women with risk factors - ask your obstetrician if you should be on this medication.  For more resources, visit:  [email or web address].[email or web address]  If you smoke, please try to quit completely but also try to reduce your smoking by as much as possible (as soon as possible).  Do not vape.  Please also avoid cannabis products.  Other warning signs to watch out for in pregnancy or postpartum: chest pain, obstructed breathing or shortness of breath, seizures, thoughts of hurting yourself or your baby, bleeding, a painful or swollen leg, fever, or headache (see [facility] POST-BIRTH Warning Signs campaign).  If these happen call 911.  Itching is also not normal in pregnancy and if you experience this, especially over your hands and feet, potentially worse at night, notify your doctors.     You elected to have non-invasive prenatal screening (NIPS). This involves a blood test to check for the four most common genetic syndromes (Trisomy 21, Trisomy 13, Trisomy 18, and sex chromosome abnormalities). It also MAY report the biologic sex of the fetus if you opted to learn this information. You can call our office to verbally review results to avoid inadvertently  learning this information via [employer], if desired. Results will be visible in your [employer] portal 5-7 business days from when the test is drawn. Please follow all instructions regarding insurance cost/coverage provided to you today. Please contact our office with any concerns or questions. You will need spina bifida screening (called MSAFP) for the baby beginning at 15 weeks gestation, which will be ordered by your obstetrician's office. This test allows for earlier detection of spina bifida than is possible by ultrasound, and is advised in all pregnancies.

## 2025-01-15 ENCOUNTER — TELEMEDICINE (OUTPATIENT)
Facility: HOSPITAL | Age: 35
End: 2025-01-15
Payer: COMMERCIAL

## 2025-01-15 DIAGNOSIS — O24.011: Primary | ICD-10-CM

## 2025-01-15 DIAGNOSIS — E03.9 HYPOTHYROIDISM AFFECTING PREGNANCY IN FIRST TRIMESTER: ICD-10-CM

## 2025-01-15 DIAGNOSIS — O99.281 HYPOTHYROIDISM AFFECTING PREGNANCY IN FIRST TRIMESTER: ICD-10-CM

## 2025-01-15 DIAGNOSIS — Z3A.13 13 WEEKS GESTATION OF PREGNANCY: ICD-10-CM

## 2025-01-15 DIAGNOSIS — E10.319: Primary | ICD-10-CM

## 2025-01-15 PROCEDURE — 99417 PROLNG OP E/M EACH 15 MIN: CPT | Performed by: NURSE PRACTITIONER

## 2025-01-15 PROCEDURE — 99215 OFFICE O/P EST HI 40 MIN: CPT | Performed by: NURSE PRACTITIONER

## 2025-01-15 RX ORDER — IBUPROFEN 600 MG/1
TABLET ORAL
COMMUNITY

## 2025-01-15 NOTE — ASSESSMENT & PLAN NOTE
-1st trimester A1c 5.7%, pending repeat A1c with thyroid labs.   -Baseline UPCR 0.07. Creatinine 0.73, .   -A1c goal less than 6% with minimal hypoglycemia.   -Pending eye exam.   -Humalog via T slim insulin pump with Dexcom G7 CGM in and out of Control IQ.   -Refer to insulin pump settings per download.   -Please adjust insulin pump as follows and stay in Control IQ until next pump review on Monday, 1/20/2025.  MN@ 0.85 units/hour;  2 AM@ 1.35 units/hour;  Add 4 AM@ 1.20 units/hour;  6 AM@ 1.20 units/hour;  8 AM@ 1.07 units/hour; Or 1.10 units/hour.   10 PM@ 0.85 units/hour.   -Control IQ BG target 110.   -Will work on switching to manual mode once episodes of hypoglycemia resolves.   -Schedule follow up with dietitian as needed.   -Self monitoring blood glucose (SMBG) fasting; 2 hours after start of each meal and with hypoglycemia.   -Okay to use Dexcom CGM for pre-meal glucose levels.   -Glucose goals: fasting 60-90 mg/dL, 140 mg/dL or less 1 hour post meals, and 120 mg/dL or less 2 hours post meal.   -Weekly insulin pump downloads with recommendations.   -Start GDM meal plan with 3 meals and 3 snacks including recommended combination of carb, protein and fat per meal/snack.  -Please eat meal or snack every 2-3.5 hours while awake.  -No more than 8 to 10 hours of fasting overnight.  -Refer to [employer] MyPlate online as a reference.  -2nd/3rd trimester minimum total daily carbohydrates 175 grams paired with half grams in protein.   -Stay active if no restriction from your OB, walk up to 30 minutes a day.  -Always have glucose available to treat hypoglycemia. Use 15:15 rule.   -Refer to hypoglycemia patient education sheet. SMBG when experiencing signs and symptoms of hypoglycemia and prior to driving.   -Serial fetal growth ultrasounds.  -20 weeks detailed fetal growth ultrasound.  -22-24 weeks fetal echo.  -At 32 weeks gestation; NST twice a week and BART weekly.   -Continue prenatal vitamin, baby  aspirin and Levothyroxine as recommended.  -Repeat TSH/free T4 lab and follow up with endocrinology for hypothyroidism.   -At 36 weeks gestation, stop baby aspirin.   -Continue follow-up with your OB and MFM as recommended.  -Stay in close contact with diabetes education team.  -Insulin requirements during pregnancy; basal/bolus concept and Metformin discussed.  -Very important to maintain tight glucose control during pregnancy to decrease risk factors including fetal macrosomia; birth injury; risk of ; polyhydramnios; pre-term labor; pre-eclampsia;  hypoglycemia; jaundice and stillbirth.   -Diabetes and pregnancy booklet; meal plan and hypoglycemia patient education.       Lab Results   Component Value Date    HGBA1C 5.7 (H) 2024

## 2025-01-15 NOTE — ASSESSMENT & PLAN NOTE
-Pre-pregnancy weight 172 lbs.  -Current weight 178 lbs.  -Starting BMI>30, recommended weight gain 11-20 lbs.

## 2025-01-15 NOTE — PROGRESS NOTES
Please refer to the [facility] ultrasound report in Ob Procedures for additional information regarding today's visit

## 2025-01-15 NOTE — PROGRESS NOTES
Virtual Regular Visit  Name: Fang Farrell      : 1990      MRN: [id number]  Encounter Provider: LAKESHA Cardenas  Encounter Date: 1/15/2025   Encounter department: [facility]      Verification of patient location:  Patient is located at Other in the following state in which I hold an active license [place] :  Assessment & Plan  Pre-existing type 1 diabetes mellitus with retinopathy during pregnancy in first trimester (HCC)  -1st trimester A1c 5.7%, pending repeat A1c with thyroid labs.   -Baseline UPCR 0.07. Creatinine 0.73, .   -A1c goal less than 6% with minimal hypoglycemia.   -Pending eye exam.   -Humalog via T slim insulin pump with Dexcom G7 CGM in and out of Control IQ.   -Refer to insulin pump settings per download.   -Please adjust insulin pump as follows and stay in Control IQ until next pump review on Monday, 2025.  MN@ 0.85 units/hour;  2 AM@ 1.35 units/hour;  Add 4 AM@ 1.20 units/hour;  6 AM@ 1.20 units/hour;  8 AM@ 1.07 units/hour; Or 1.10 units/hour.   10 PM@ 0.85 units/hour.   -Control IQ BG target 110.   -Will work on switching to manual mode once episodes of hypoglycemia resolves.   -Schedule follow up with dietitian as needed.   -Self monitoring blood glucose (SMBG) fasting; 2 hours after start of each meal and with hypoglycemia.   -Okay to use Dexcom CGM for pre-meal glucose levels.   -Glucose goals: fasting 60-90 mg/dL, 140 mg/dL or less 1 hour post meals, and 120 mg/dL or less 2 hours post meal.   -Weekly insulin pump downloads with recommendations.   -Start GDM meal plan with 3 meals and 3 snacks including recommended combination of carb, protein and fat per meal/snack.  -Please eat meal or snack every 2-3.5 hours while awake.  -No more than 8 to 10 hours of fasting overnight.  -Refer to [employer] MyPlate online as a reference.  -2nd/3rd trimester minimum total daily carbohydrates 175 grams paired with half grams in protein.   -Stay active if no  restriction from your OB, walk up to 30 minutes a day.  -Always have glucose available to treat hypoglycemia. Use 15:15 rule.   -Refer to hypoglycemia patient education sheet. SMBG when experiencing signs and symptoms of hypoglycemia and prior to driving.   -Serial fetal growth ultrasounds.  -20 weeks detailed fetal growth ultrasound.  -22-24 weeks fetal echo.  -At 32 weeks gestation; NST twice a week and BART weekly.   -Continue prenatal vitamin, baby aspirin and Levothyroxine as recommended.  -Repeat TSH/free T4 lab and follow up with endocrinology for hypothyroidism.   -At 36 weeks gestation, stop baby aspirin.   -Continue follow-up with your OB and MFM as recommended.  -Stay in close contact with diabetes education team.  -Insulin requirements during pregnancy; basal/bolus concept and Metformin discussed.  -Very important to maintain tight glucose control during pregnancy to decrease risk factors including fetal macrosomia; birth injury; risk of ; polyhydramnios; pre-term labor; pre-eclampsia;  hypoglycemia; jaundice and stillbirth.   -Diabetes and pregnancy booklet; meal plan and hypoglycemia patient education.       Lab Results   Component Value Date    HGBA1C 5.7 (H) 2024         13 weeks gestation of pregnancy         Hypothyroidism affecting pregnancy in first trimester  -2024 TSH 1.39 nl, FT4 1.37.  -On Levothyroxine 150 mcg.   -Followed by endocrinology.  -Pending repeat TSH/free T4.        BMI 31.0-31.9,adult  -Pre-pregnancy weight 172 lbs.  -Current weight 178 lbs.  -Starting BMI>30, recommended weight gain 11-20 lbs.                Encounter provider LAKESHA Cardenas    The patient was identified by name and date of birth. Fang Farrell was informed that this is a telemedicine visit and that the visit is being conducted through the Epic Embedded platform. She agrees to proceed..  My office door was closed. No one else was in the room.  She acknowledged consent and understanding  of privacy and security of the video platform. The patient has agreed to participate and understands they can discontinue the visit at any time.    Patient is aware this is a billable service.     History of Present Illness   Fang is a 33 yo  female, 13 1/7 weeks gestation presents with T1DM on Humalog via T slim insulin pump with Dexcom G7 CGM. Has been self adjusting settings due to hyper and hypoglycemia; is in and out of Control IQ. Agreed to stay in Control IQ until more data is obtained. Starting A1c 5.7%. Has a toddler. Finding it difficult to eat 3 meals and 3 snacks. Knows how to treat hypoglycemia. Up to date weight gain 6 lbs.   Hypothyroidism- last TSH/free T4 normal on current dose of Levothyroxine, followed by endocrinology and has a script to repeat TSH/free T4 and A1c.   HPI  Review of Systems   Constitutional:  Positive for fatigue (improving). Negative for fever.   HENT:  Negative for congestion and trouble swallowing.    Eyes:  Negative for visual disturbance.        Last eye exam a year and has one scheduled.    Respiratory:  Negative for cough and shortness of breath.    Cardiovascular:  Negative for chest pain, palpitations and leg swelling.   Gastrointestinal:  Positive for nausea. Negative for constipation and vomiting.        Heartburn- uses tums   Endocrine: Positive for polyuria. Negative for polydipsia and polyphagia.   Genitourinary:  Negative for difficulty urinating and vaginal bleeding.   Musculoskeletal:  Positive for back pain.   Skin:  Negative for rash.   Neurological:  Negative for headaches.   Psychiatric/Behavioral:  Negative for sleep disturbance.        Objective   LMP 10/15/2024 (Approximate)   Refer to attached file for insulin pump download.   Physical Exam  HENT:      Head: Normocephalic.      Nose: Nose normal.   Eyes:      Conjunctiva/sclera: Conjunctivae normal.   Pulmonary:      Effort: Pulmonary effort is normal.   Neurological:      Mental Status: She is alert  and oriented to person, place, and time.         Visit Time  Total Visit Duration: 51 minutes with patient and 20 minutes reviewing insulin pump download with adjustments, reviewing chart and charting.

## 2025-01-15 NOTE — ASSESSMENT & PLAN NOTE
-11/25/2024 TSH 1.39 nl, FT4 1.37.  -On Levothyroxine 150 mcg.   -Followed by endocrinology.  -Pending repeat TSH/free T4.

## 2025-01-17 ENCOUNTER — TELEPHONE (OUTPATIENT)
Age: 35
End: 2025-01-17

## 2025-01-17 ENCOUNTER — ROUTINE PRENATAL (OUTPATIENT)
Dept: PERINATAL CARE | Facility: OTHER | Age: 35
End: 2025-01-17
Payer: COMMERCIAL

## 2025-01-17 VITALS
BODY MASS INDEX: 32.87 KG/M2 | HEART RATE: 104 BPM | SYSTOLIC BLOOD PRESSURE: 104 MMHG | WEIGHT: 178.6 LBS | HEIGHT: 62 IN | DIASTOLIC BLOOD PRESSURE: 64 MMHG

## 2025-01-17 DIAGNOSIS — O09.521 SUPERVISION OF ELDERLY MULTIGRAVIDA IN FIRST TRIMESTER: ICD-10-CM

## 2025-01-17 DIAGNOSIS — E10.40 TYPE 1 DIABETES MELLITUS WITH DIABETIC NEUROPATHY (HCC): ICD-10-CM

## 2025-01-17 DIAGNOSIS — O09.521 ELDERLY MULTIGRAVIDA, FIRST TRIMESTER: ICD-10-CM

## 2025-01-17 DIAGNOSIS — O24.011 TYPE 1 DIABETES MELLITUS COMPLICATING PREGNANCY, ANTEPARTUM, FIRST TRIMESTER: Primary | ICD-10-CM

## 2025-01-17 DIAGNOSIS — O09.891 SHORT INTERVAL BETWEEN PREGNANCIES COMPLICATING PREGNANCY, ANTEPARTUM, FIRST TRIMESTER: ICD-10-CM

## 2025-01-17 DIAGNOSIS — E03.9 HYPOTHYROIDISM IN PREGNANCY, ANTEPARTUM, FIRST TRIMESTER: ICD-10-CM

## 2025-01-17 DIAGNOSIS — O99.281 HYPOTHYROIDISM IN PREGNANCY, ANTEPARTUM, FIRST TRIMESTER: ICD-10-CM

## 2025-01-17 DIAGNOSIS — Z36.0 ENCOUNTER FOR ANTENATAL SCREENING FOR CHROMOSOMAL ANOMALIES: ICD-10-CM

## 2025-01-17 DIAGNOSIS — Z3A.13 13 WEEKS GESTATION OF PREGNANCY: ICD-10-CM

## 2025-01-17 PROCEDURE — 76813 OB US NUCHAL MEAS 1 GEST: CPT | Performed by: OBSTETRICS & GYNECOLOGY

## 2025-01-17 PROCEDURE — 76801 OB US < 14 WKS SINGLE FETUS: CPT | Performed by: OBSTETRICS & GYNECOLOGY

## 2025-01-17 PROCEDURE — 99214 OFFICE O/P EST MOD 30 MIN: CPT | Performed by: OBSTETRICS & GYNECOLOGY

## 2025-01-17 PROCEDURE — 36415 COLL VENOUS BLD VENIPUNCTURE: CPT | Performed by: OBSTETRICS & GYNECOLOGY

## 2025-01-17 NOTE — LETTER
January 18, 2025     Halina Bagley MD  [address] [facility] [address]    Patient: Fang Farrell   YOB: 1990   Date of Visit: 1/17/2025       Dear Dr. Bagley:    Thank you for referring Fang Farrell to me for evaluation. Below are my notes for this consultation.    If you have questions, please do not hesitate to call me. I look forward to following your patient along with you.         Sincerely,        Ajay Grant MD        CC: No Recipients    Ajay Grant MD  1/14/2025  8:24 PM  Sign when Signing Visit  Please refer to the [facility] ultrasound report in Ob Procedures for additional information regarding today's visit

## 2025-01-17 NOTE — TELEPHONE ENCOUNTER
Patient states her appt was to be at 1pm for today 2025 but now she just received a text that her appt is for 10:15am today. Patient states she will be at least 15min late. Warm transfer to [facility]  x2 , unsuccessful. Please call patient back, as she will be on her way to her appt.     Eta 10:35am

## 2025-01-17 NOTE — PROGRESS NOTES
Patient chose to have [facility] [id number] Non-Invasive Prenatal Screen [id number] PLUS w/ SCA, WITH fetal sex (per pt request).  Patient choose [facility]'s self-pay option of $299. Patient has R insurance and prefers to pay $299 option for test.      Patient given brochure and is aware [facility] will contact patient's insurance and coordinate coverage.  Provided [facility] contact information. General inquiries [phone], Cost estimates [phone] and [facility] Billing [phone]. Website [email or web address].     Blood collection tubes labeled with patient identifiers (name, medical record number, and date of birth).     Filled out [facility] order form. Patient chose to have blood drawn in our office at time of visit. NIPS was drawn from right arm with a butterfly needle by LILY Arango MA.       If patient chose to have blood work drawn at a [facility] lab we requested patient notify MFM (via phone call or Everyclick message) when blood collected so office can follow up on results.       Maternal Fetal Medicine will have results in approximately 5-7 business days and will call patient or notify via Everyclick.  Patient aware viewing lab result online will reveal fetal sex if ordered.    Patient verbalized understanding of all instructions and no questions at this time.

## 2025-01-20 ENCOUNTER — TELEPHONE (OUTPATIENT)
Age: 35
End: 2025-01-20

## 2025-01-20 NOTE — TELEPHONE ENCOUNTER
[facility] called in regards to your questions about creatinine calculations.  says its correct because they use mg/dl. You are correct if the test was using g per day.

## 2025-01-22 LAB
CFDNA.FET/CFDNA.TOTAL SFR FETUS: NORMAL %
CITATION REF LAB TEST: NORMAL
FET 13+18+21+X+Y ANEUP PLAS.CFDNA: NEGATIVE
FET CHR 21 TS PLAS.CFDNA QL: NEGATIVE
FET CHR 21 TS PLAS.CFDNA QL: NEGATIVE
FET MS X RISK WBC.DNA+CFDNA QL: NOT DETECTED
FET SEX PLAS.CFDNA DOSAGE CFDNA: NORMAL
FET TS 13 RISK PLAS.CFDNA QL: NEGATIVE
FET X + Y ANEUP RISK PLAS.CFDNA SEQ-IMP: NOT DETECTED
GA EST FROM CONCEPTION DATE: NORMAL D
GESTATIONAL AGE > 9:: YES
LAB DIRECTOR NAME PROVIDER: NORMAL
LAB DIRECTOR NAME PROVIDER: NORMAL
LABORATORY COMMENT REPORT: NORMAL
LIMITATIONS OF THE TEST: NORMAL
NEGATIVE PREDICTIVE VALUE: NORMAL
PERFORMANCE CHARACTERISTICS: NORMAL
POSITIVE PREDICTIVE VALUE: NORMAL
REF LAB TEST METHOD: NORMAL
SL AMB NOTE:: NORMAL
TEST PERFORMANCE INFO SPEC: NORMAL

## 2025-01-24 LAB
EST. AVERAGE GLUCOSE BLD GHB EST-MCNC: 103 MG/DL
HBA1C MFR BLD: 5.2 % (ref 4.8–5.6)
LEFT EYE DIABETIC RETINOPATHY: POSITIVE
RIGHT EYE DIABETIC RETINOPATHY: POSITIVE

## 2025-01-25 LAB
T4 FREE SERPL-MCNC: 1.59 NG/DL (ref 0.82–1.77)
TSH SERPL DL<=0.005 MIU/L-ACNC: 0.69 UIU/ML (ref 0.45–4.5)

## 2025-01-27 ENCOUNTER — RESULTS FOLLOW-UP (OUTPATIENT)
Facility: HOSPITAL | Age: 35
End: 2025-01-27

## 2025-01-29 ENCOUNTER — RESULTS FOLLOW-UP (OUTPATIENT)
Dept: ENDOCRINOLOGY | Facility: HOSPITAL | Age: 35
End: 2025-01-29

## 2025-01-29 NOTE — RESULT ENCOUNTER NOTE
Please call the patient regarding result.  TSH and free T4 look good.  We will continue to monitor every 4 weeks during pregnancy.  Hope she is doing well!

## 2025-02-01 DIAGNOSIS — Z34.81 MULTIGRAVIDA IN FIRST TRIMESTER: ICD-10-CM

## 2025-02-01 DIAGNOSIS — E10.3493: ICD-10-CM

## 2025-02-03 RX ORDER — FOLIC ACID 1 MG/1
4000 TABLET ORAL DAILY
Qty: 360 TABLET | Refills: 0 | Status: SHIPPED | OUTPATIENT
Start: 2025-02-03

## 2025-02-07 ENCOUNTER — ROUTINE PRENATAL (OUTPATIENT)
Age: 35
End: 2025-02-07
Payer: COMMERCIAL

## 2025-02-07 VITALS — WEIGHT: 179.8 LBS | BODY MASS INDEX: 32.89 KG/M2 | DIASTOLIC BLOOD PRESSURE: 72 MMHG | SYSTOLIC BLOOD PRESSURE: 118 MMHG

## 2025-02-07 DIAGNOSIS — Z36.9 ENCOUNTER FOR ANTENATAL SCREENING OF MOTHER: ICD-10-CM

## 2025-02-07 DIAGNOSIS — Z34.82 PRENATAL CARE, SUBSEQUENT PREGNANCY, SECOND TRIMESTER: Primary | ICD-10-CM

## 2025-02-07 DIAGNOSIS — Z33.1 PREGNANT STATE, INCIDENTAL: ICD-10-CM

## 2025-02-07 LAB
SL AMB  POCT GLUCOSE, UA: NORMAL
SL AMB POCT URINE PROTEIN: NORMAL

## 2025-02-07 PROCEDURE — PNV: Performed by: OBSTETRICS & GYNECOLOGY

## 2025-02-07 PROCEDURE — 81002 URINALYSIS NONAUTO W/O SCOPE: CPT | Performed by: OBSTETRICS & GYNECOLOGY

## 2025-02-07 NOTE — PROGRESS NOTES
16w1d  Pn1 Labs completed and TSH.  [id number] completed  AFP ordered today  Level II completed (Anterior Placenta)    IUI 10/31/24     Pap + GC/CH results all negative  Blood Type: A POSITIVE   Pre-existing Type 1 diabetic w/ insulin pump.-pump automatically reports sugars    Denies Leaking of Fluid, vaginal bleeding, contractions  Not feeling flutters

## 2025-02-07 NOTE — PROGRESS NOTES
No flutter yet  No VB or cramping  Normal NT and low-risk NIPT; it's a girl!  Discussed EDC.  Reviewed all sonos again.  Will keep dating set by VIOLETA  AFP order given  Level II scheduled.  Last HgA1C 1/24/2025 5.2

## 2025-02-18 ENCOUNTER — ROUTINE PRENATAL (OUTPATIENT)
Dept: PERINATAL CARE | Facility: OTHER | Age: 35
End: 2025-02-18
Payer: COMMERCIAL

## 2025-02-18 VITALS
BODY MASS INDEX: 33.82 KG/M2 | DIASTOLIC BLOOD PRESSURE: 68 MMHG | HEART RATE: 101 BPM | HEIGHT: 62 IN | SYSTOLIC BLOOD PRESSURE: 104 MMHG | WEIGHT: 183.8 LBS

## 2025-02-18 DIAGNOSIS — Z36.3 ENCOUNTER FOR ANTENATAL SCREENING FOR MALFORMATIONS: ICD-10-CM

## 2025-02-18 DIAGNOSIS — O24.011: Primary | ICD-10-CM

## 2025-02-18 DIAGNOSIS — E10.319: Primary | ICD-10-CM

## 2025-02-18 PROCEDURE — 76805 OB US >/= 14 WKS SNGL FETUS: CPT | Performed by: OBSTETRICS & GYNECOLOGY

## 2025-02-18 PROCEDURE — 99213 OFFICE O/P EST LOW 20 MIN: CPT | Performed by: OBSTETRICS & GYNECOLOGY

## 2025-02-22 LAB
T4 FREE SERPL-MCNC: 1.26 NG/DL (ref 0.82–1.77)
TSH SERPL DL<=0.005 MIU/L-ACNC: 2.89 UIU/ML (ref 0.45–4.5)

## 2025-02-25 ENCOUNTER — TELEPHONE (OUTPATIENT)
Age: 35
End: 2025-02-25

## 2025-02-25 LAB
2ND TRIMESTER 4 SCREEN SERPL-IMP: NORMAL
AFP ADJ MOM SERPL: 1.04
AFP INTERP AMN-IMP: NORMAL
AFP INTERP SERPL-IMP: NORMAL
AFP INTERP SERPL-IMP: NORMAL
AFP SERPL-MCNC: 33.6 NG/ML
AGE AT DELIVERY: 36.8 YR
GA METHOD: NORMAL
GA: 18.4 WEEKS
IDDM PATIENT QL: YES
MULTIPLE PREGNANCY: NO
NEURAL TUBE DEFECT RISK FETUS: 3255 %

## 2025-02-26 ENCOUNTER — RESULTS FOLLOW-UP (OUTPATIENT)
Age: 35
End: 2025-02-26

## 2025-02-26 ENCOUNTER — RESULTS FOLLOW-UP (OUTPATIENT)
Dept: ENDOCRINOLOGY | Facility: HOSPITAL | Age: 35
End: 2025-02-26

## 2025-02-27 ENCOUNTER — TELEPHONE (OUTPATIENT)
Dept: OBGYN CLINIC | Facility: CLINIC | Age: 35
End: 2025-02-27

## 2025-02-27 NOTE — TELEPHONE ENCOUNTER
Attempted to contact patient for 2nd Trimester Check-in Call. Pt unavailable. [facility] msg was sent  2/24/25.  Left msg to reach out w/questions or concerns.

## 2025-02-27 NOTE — RESULT ENCOUNTER NOTE
Please call the patient regarding abnormal result.  TSH is elevated to 2.89.  Has she missed any doses?  We may need to adjust.

## 2025-02-28 ENCOUNTER — HOSPITAL ENCOUNTER (OUTPATIENT)
Dept: HOSPITAL 99 - HWCARD | Age: 35
End: 2025-02-28
Payer: COMMERCIAL

## 2025-02-28 DIAGNOSIS — E10.8: Primary | ICD-10-CM

## 2025-03-07 ENCOUNTER — ROUTINE PRENATAL (OUTPATIENT)
Age: 35
End: 2025-03-07
Payer: COMMERCIAL

## 2025-03-07 VITALS — WEIGHT: 187 LBS | BODY MASS INDEX: 34.2 KG/M2 | DIASTOLIC BLOOD PRESSURE: 64 MMHG | SYSTOLIC BLOOD PRESSURE: 130 MMHG

## 2025-03-07 DIAGNOSIS — Z34.82 PRENATAL CARE, SUBSEQUENT PREGNANCY, SECOND TRIMESTER: Primary | ICD-10-CM

## 2025-03-07 LAB
SL AMB  POCT GLUCOSE, UA: NEGATIVE
SL AMB POCT URINE PROTEIN: NEGATIVE

## 2025-03-07 PROCEDURE — PNV: Performed by: OBSTETRICS & GYNECOLOGY

## 2025-03-07 PROCEDURE — 81002 URINALYSIS NONAUTO W/O SCOPE: CPT | Performed by: OBSTETRICS & GYNECOLOGY

## 2025-03-07 NOTE — PROGRESS NOTES
20w1d. IUI   Genetic testing both negative  PN labs-utd.   Pre-existing Type 1 diabetic w/ insulin pump.-saw Diabetic pathways.  Level 2 scheduled 3/17/25  Fetal echo- scheduled 4/4/25  Tour scheduled.   LOF-no   VB-no   CTX-no    FM- yes  EPDS- 2

## 2025-03-07 NOTE — PROGRESS NOTES
Routine PN visit.   Type I DM - prefer to manage on her own, would be okay working with MFM docs, but does not want to work with diabetic pathways.  Does have endocrine follow up scheduled.   No concerns today.   Early anatomy no concerns, has level II US and fetal echo scheduled.   No bleeding/LOF/contractions.   + FM

## 2025-03-17 ENCOUNTER — ROUTINE PRENATAL (OUTPATIENT)
Dept: PERINATAL CARE | Facility: OTHER | Age: 35
End: 2025-03-17
Payer: COMMERCIAL

## 2025-03-17 VITALS
SYSTOLIC BLOOD PRESSURE: 98 MMHG | HEART RATE: 98 BPM | HEIGHT: 62 IN | BODY MASS INDEX: 34.96 KG/M2 | WEIGHT: 190 LBS | DIASTOLIC BLOOD PRESSURE: 60 MMHG

## 2025-03-17 DIAGNOSIS — Z3A.21 21 WEEKS GESTATION OF PREGNANCY: Primary | ICD-10-CM

## 2025-03-17 DIAGNOSIS — O24.012 PRE-EXISTING TYPE 1 DIABETES MELLITUS WITH RETINOPATHY DURING PREGNANCY IN SECOND TRIMESTER (HCC): ICD-10-CM

## 2025-03-17 DIAGNOSIS — Z36.86 ENCOUNTER FOR ANTENATAL SCREENING FOR CERVICAL LENGTH: ICD-10-CM

## 2025-03-17 DIAGNOSIS — E10.319 PRE-EXISTING TYPE 1 DIABETES MELLITUS WITH RETINOPATHY DURING PREGNANCY IN SECOND TRIMESTER (HCC): ICD-10-CM

## 2025-03-17 DIAGNOSIS — O09.522 MULTIGRAVIDA OF ADVANCED MATERNAL AGE IN SECOND TRIMESTER: ICD-10-CM

## 2025-03-17 PROCEDURE — 76811 OB US DETAILED SNGL FETUS: CPT | Performed by: OBSTETRICS & GYNECOLOGY

## 2025-03-17 PROCEDURE — 76817 TRANSVAGINAL US OBSTETRIC: CPT | Performed by: OBSTETRICS & GYNECOLOGY

## 2025-03-17 PROCEDURE — 99213 OFFICE O/P EST LOW 20 MIN: CPT | Performed by: OBSTETRICS & GYNECOLOGY

## 2025-03-17 NOTE — PROGRESS NOTES
Ultrasound Probe Disinfection    A transvaginal ultrasound was performed.   Prior to use, disinfection was performed with High Level Disinfection Process ([employer]).  Probe serial number [id number]: [id number] was used.    Gabi Guajardo  03/17/25  8:42 AM

## 2025-03-17 NOTE — PROGRESS NOTES
The patient was seen today for an ultrasound.  Please see ultrasound report (located under Ob Procedures) for additional details.   Thank you very much for allowing us to participate in the care of this very nice patient.  Should you have any questions, please do not hesitate to contact me.     Samuel Kapadia MD FACOG  Attending Physician, Maternal-Fetal Medicine  [facility]

## 2025-03-22 LAB
T4 FREE SERPL-MCNC: 1.47 NG/DL (ref 0.82–1.77)
TSH SERPL DL<=0.005 MIU/L-ACNC: 1.33 UIU/ML (ref 0.45–4.5)

## 2025-03-25 ENCOUNTER — RESULTS FOLLOW-UP (OUTPATIENT)
Dept: ENDOCRINOLOGY | Facility: HOSPITAL | Age: 35
End: 2025-03-25

## 2025-03-26 ENCOUNTER — OFFICE VISIT (OUTPATIENT)
Dept: ENDOCRINOLOGY | Facility: HOSPITAL | Age: 35
End: 2025-03-26
Payer: COMMERCIAL

## 2025-03-26 VITALS
HEART RATE: 103 BPM | DIASTOLIC BLOOD PRESSURE: 80 MMHG | SYSTOLIC BLOOD PRESSURE: 110 MMHG | BODY MASS INDEX: 35.04 KG/M2 | WEIGHT: 190.4 LBS | HEIGHT: 62 IN

## 2025-03-26 DIAGNOSIS — E07.9 THYROID DISEASE DURING PREGNANCY IN THIRD TRIMESTER: ICD-10-CM

## 2025-03-26 DIAGNOSIS — O99.282 HYPOTHYROIDISM AFFECTING PREGNANCY IN SECOND TRIMESTER: ICD-10-CM

## 2025-03-26 DIAGNOSIS — E07.9 THYROID DISEASE DURING PREGNANCY IN FIRST TRIMESTER: ICD-10-CM

## 2025-03-26 DIAGNOSIS — E10.3291 MILD NONPROLIFERATIVE DIABETIC RETINOPATHY OF RIGHT EYE WITHOUT MACULAR EDEMA ASSOCIATED WITH TYPE 1 DIABETES MELLITUS (HCC): ICD-10-CM

## 2025-03-26 DIAGNOSIS — E10.3493: ICD-10-CM

## 2025-03-26 DIAGNOSIS — E03.9 HYPOTHYROIDISM, UNSPECIFIED TYPE: ICD-10-CM

## 2025-03-26 DIAGNOSIS — E10.40 TYPE 1 DIABETES MELLITUS WITH DIABETIC NEUROPATHY (HCC): Primary | ICD-10-CM

## 2025-03-26 DIAGNOSIS — E10.3212 MILD NONPROLIFERATIVE DIABETIC RETINOPATHY OF LEFT EYE WITH MACULAR EDEMA ASSOCIATED WITH TYPE 1 DIABETES MELLITUS (HCC): ICD-10-CM

## 2025-03-26 DIAGNOSIS — E10.9 TYPE 1 DIABETES MELLITUS WITHOUT COMPLICATION (HCC): ICD-10-CM

## 2025-03-26 DIAGNOSIS — O99.283 THYROID DISEASE DURING PREGNANCY IN THIRD TRIMESTER: ICD-10-CM

## 2025-03-26 DIAGNOSIS — E03.9 HYPOTHYROIDISM AFFECTING PREGNANCY IN SECOND TRIMESTER: ICD-10-CM

## 2025-03-26 DIAGNOSIS — E03.9 ACQUIRED HYPOTHYROIDISM: ICD-10-CM

## 2025-03-26 DIAGNOSIS — O99.281 THYROID DISEASE DURING PREGNANCY IN FIRST TRIMESTER: ICD-10-CM

## 2025-03-26 PROCEDURE — 99214 OFFICE O/P EST MOD 30 MIN: CPT | Performed by: NURSE PRACTITIONER

## 2025-03-26 PROCEDURE — 95251 CONT GLUC MNTR ANALYSIS I&R: CPT | Performed by: NURSE PRACTITIONER

## 2025-03-26 RX ORDER — LEVOTHYROXINE SODIUM 150 UG/1
TABLET ORAL
Qty: 90 TABLET | Refills: 3 | Status: SHIPPED | OUTPATIENT
Start: 2025-03-26

## 2025-03-26 NOTE — ASSESSMENT & PLAN NOTE
Orders:    CBC and differential; Future    Comprehensive metabolic panel; Future    Hemoglobin A1C; Future    levothyroxine 150 mcg tablet; Take 1 tablet - 6 days per week.

## 2025-03-26 NOTE — PROGRESS NOTES
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 without macular edema associated with type 1 diabetes mellitus (HCC)    Lab Results   Component Value Date    HGBA1C 5.2 2025       Orders:    CBC and differential; Future    Comprehensive metabolic panel; Future    Hemoglobin A1C; Future    Mild nonproliferative diabetic retinopathy of left eye with macular edema associated with type 1 diabetes mellitus (HCC)    Lab Results   Component Value Date    HGBA1C 5.2 2025       Orders:    CBC and differential; Future    Comprehensive metabolic panel; Future    Hemoglobin A1C; Future    Hypothyroidism, unspecified type    Orders:    CBC and differential; Future    Comprehensive metabolic panel; Future    Hemoglobin A1C; Future    Type 1 diabetes mellitus without complication (HCC)    Lab Results   Component Value Date    HGBA1C 5.2 2025       Orders:    CBC and differential; Future    Comprehensive metabolic panel; Future    Hemoglobin A1C;  Future    Thyroid disease during pregnancy in first trimester    Orders:    CBC and differential; Future    Comprehensive metabolic panel; Future    Hemoglobin A1C; Future    Hypothyroidism affecting pregnancy in second trimester    Orders:    CBC and differential; Future    Comprehensive metabolic panel; Future    Hemoglobin A1C; Future    Thyroid disease during pregnancy in third trimester    Orders:    CBC and differential; Future    Comprehensive metabolic panel; Future    Hemoglobin A1C; Future      Plan:  1.  Hypothyroidism.  Most recent TSH is in the normal range at 1.330.  For now, she will continue levothyroxine 150 mcg - 1 tablet daily for 6 days of the week with no tablet on Sunday.  She will focus on consistency.  Check TSH and free T4 every 4 weeks during pregnancy.     2.  Type 1 diabetes.  Recent hemoglobin A1c is 5.2.  Overall, she is relatively well-controlled when reviewing her Dexcom continuous glucose monitor.  For now, continue pump current settings.  I stressed the importance of her following up consistently with MFM .she will continue to utilize the pump and sensor and send a report to the office in 2 weeks for review.  Hemoglobin A1c prior to next visit      History of Present Illness     33 y.o. year old female with history of hypothyroidism and type 1 diabetes with retinopathy for follow-up visit.     She has hypothyroidism and takes levothyroxine 150 mcg 1 tablet - daily. . her most resent TSH from March 21, 2025 is 1.330 with a Free T4 of 1.47. She is now 23 weeks pregnant.     She has type 1 diabetes diagnosed 33 years ago.  She is currently pregnant on an insulin pump utilizing a t:slim to insulin pump with Dexcom G6 continuous glucose monitoring system integrated with her insulin pump.  Recent hemoglobin A1c from January 24, 2025 is 5.2.  Last diabetic eye exam was in January 2024 and last diabetic foot exam was January 31, 2024.     She utilizes the Tandem T-Slim X 2 insulin pump with  "Dexcom G7 continuous glucose monitoring system integrated with her insulin pump.  She is currently not in control IQ.  Setting as noted in Tandem Download. Scanned in chart.    Dexcom sensor shows an average glucose of 108 with a standard deviation of 33.  She is in target range 87% of the time with 4% elevated readings and 9% low readings.  She is utilizing an average daily dose of 66.74 units of insulin 43% of which is basal and 57% is bolus.    Last Eye Exam: 01/24/2025  Last Foot Exam: 01/31/2024  Health Maintenance   Topic Date Due    Diabetic Foot Exam  01/31/2025    Diabetic Eye Exam  01/24/2026         Review of Systems   Constitutional: Negative.  Negative for chills, fatigue and fever.   HENT: Negative.  Negative for trouble swallowing and voice change.    Eyes: Negative.  Negative for photophobia, pain, discharge, redness, itching and visual disturbance.   Respiratory: Negative.  Negative for chest tightness and shortness of breath.    Cardiovascular: Negative.  Negative for chest pain.   Gastrointestinal: Negative.  Negative for abdominal pain, constipation, diarrhea and vomiting.   Endocrine: Negative for cold intolerance, heat intolerance, polydipsia, polyphagia and polyuria.   Genitourinary: Negative.    Musculoskeletal: Negative.    Skin: Negative.    Allergic/Immunologic: Negative.    Neurological: Negative.  Negative for dizziness, syncope, light-headedness and headaches.   Hematological: Negative.    Psychiatric/Behavioral: Negative.     All other systems reviewed and are negative.   as per HPI      Objective   /80   Pulse 103   Ht 5' 2\" (1.575 m)   Wt 86.4 kg (190 lb 6.4 oz)   LMP 10/15/2024 (Approximate)   BMI 34.82 kg/m²      Body mass index is 34.82 kg/m².  Wt Readings from Last 3 Encounters:   03/26/25 86.4 kg (190 lb 6.4 oz)   03/17/25 86.2 kg (190 lb)   03/07/25 84.8 kg (187 lb)     Physical Exam  Vitals reviewed.   Constitutional:       Appearance: She is well-developed.   HENT: "      Head: Normocephalic and atraumatic.   Eyes:      Conjunctiva/sclera: Conjunctivae normal.      Pupils: Pupils are equal, round, and reactive to light.   Cardiovascular:      Rate and Rhythm: Normal rate and regular rhythm.      Heart sounds: Normal heart sounds.   Pulmonary:      Effort: Pulmonary effort is normal.      Breath sounds: Normal breath sounds.   Abdominal:      General: Bowel sounds are normal.      Palpations: Abdomen is soft.   Musculoskeletal:         General: Normal range of motion.      Cervical back: Normal range of motion and neck supple.   Skin:     General: Skin is warm and dry.   Neurological:      Mental Status: She is alert and oriented to person, place, and time.   Psychiatric:         Behavior: Behavior normal.         Thought Content: Thought content normal.         Judgment: Judgment normal.         Labs:   Lab Results   Component Value Date    HGBA1C 5.2 01/24/2025    HGBA1C 5.7 (H) 11/25/2024    HGBA1C 5.4 08/19/2024     Lab Results   Component Value Date    CREATININE 0.73 01/10/2025    CREATININE 0.87 11/25/2024    CREATININE 0.92 08/19/2024    BUN 8 01/10/2025    K 4.3 01/10/2025     01/10/2025    CO2 22 01/10/2025     eGFR   Date Value Ref Range Status   01/10/2025 109 >59 mL/min/1.73 Final   08/02/2023 119 ml/min/1.73sq m Final     Lab Results   Component Value Date    HDL 46 11/25/2024    TRIG 111 11/25/2024    CHOLHDL 3.3 10/23/2023     Lab Results   Component Value Date    ALT 10 01/10/2025    AST 19 01/10/2025    ALKPHOS 123 (H) 08/02/2023         There are no Patient Instructions on file for this visit.    Discussed with the patient and all questioned fully answered. She will call me if any problems arise.

## 2025-03-26 NOTE — PATIENT INSTRUCTIONS
Be mindful of diet.     Stay active and stay hydrated.     Continue the DEX COM.     Follow up with MFM as soon as possible.     You will need to continue in manual mode, during pregnancy.     Update your Tandem pump to utilize the DEXCOM G7.     KEEP UP THE GREAT WORK !!!     Take levothyroxine 150 - 6 days a week with no tablet 1 day a week.     Take your levothyroxine consistently, in the morning, on an empty stomach with only water and wait 30 min before eating and 4 hr before taking multivitamins or supplements.     Obtain thyroid lab work every 4 weeks during pregnancy.

## 2025-03-26 NOTE — ASSESSMENT & PLAN NOTE
Lab Results   Component Value Date    HGBA1C 5.2 01/24/2025       Orders:    CBC and differential; Future    Comprehensive metabolic panel; Future    Hemoglobin A1C; Future

## 2025-04-04 ENCOUNTER — ROUTINE PRENATAL (OUTPATIENT)
Dept: PERINATAL CARE | Facility: OTHER | Age: 35
End: 2025-04-04
Payer: COMMERCIAL

## 2025-04-04 ENCOUNTER — ROUTINE PRENATAL (OUTPATIENT)
Age: 35
End: 2025-04-04
Payer: COMMERCIAL

## 2025-04-04 VITALS
BODY MASS INDEX: 34.93 KG/M2 | HEIGHT: 62 IN | HEART RATE: 100 BPM | WEIGHT: 189.8 LBS | SYSTOLIC BLOOD PRESSURE: 94 MMHG | DIASTOLIC BLOOD PRESSURE: 60 MMHG

## 2025-04-04 VITALS — WEIGHT: 189 LBS | BODY MASS INDEX: 34.57 KG/M2 | SYSTOLIC BLOOD PRESSURE: 114 MMHG | DIASTOLIC BLOOD PRESSURE: 62 MMHG

## 2025-04-04 DIAGNOSIS — Z11.3 SCREENING FOR STD (SEXUALLY TRANSMITTED DISEASE): ICD-10-CM

## 2025-04-04 DIAGNOSIS — E10.319 PRE-EXISTING TYPE 1 DIABETES MELLITUS WITH RETINOPATHY DURING PREGNANCY IN SECOND TRIMESTER (HCC): ICD-10-CM

## 2025-04-04 DIAGNOSIS — Z3A.24 24 WEEKS GESTATION OF PREGNANCY: Primary | ICD-10-CM

## 2025-04-04 DIAGNOSIS — Z34.82 PRENATAL CARE, SUBSEQUENT PREGNANCY, SECOND TRIMESTER: Primary | ICD-10-CM

## 2025-04-04 DIAGNOSIS — O24.012 PRE-EXISTING TYPE 1 DIABETES MELLITUS WITH RETINOPATHY DURING PREGNANCY IN SECOND TRIMESTER (HCC): ICD-10-CM

## 2025-04-04 LAB
SL AMB  POCT GLUCOSE, UA: NORMAL
SL AMB POCT URINE PROTEIN: NORMAL

## 2025-04-04 PROCEDURE — 76825 ECHO EXAM OF FETAL HEART: CPT | Performed by: OBSTETRICS & GYNECOLOGY

## 2025-04-04 PROCEDURE — 99213 OFFICE O/P EST LOW 20 MIN: CPT | Performed by: OBSTETRICS & GYNECOLOGY

## 2025-04-04 PROCEDURE — PNV: Performed by: OBSTETRICS & GYNECOLOGY

## 2025-04-04 PROCEDURE — 76827 ECHO EXAM OF FETAL HEART: CPT | Performed by: OBSTETRICS & GYNECOLOGY

## 2025-04-04 PROCEDURE — 93325 DOPPLER ECHO COLOR FLOW MAPG: CPT | Performed by: OBSTETRICS & GYNECOLOGY

## 2025-04-04 PROCEDURE — 81002 URINALYSIS NONAUTO W/O SCOPE: CPT | Performed by: OBSTETRICS & GYNECOLOGY

## 2025-04-04 NOTE — PROGRESS NOTES
24w1d  28wk labs ordered today  Pre-existing Type 1 diabetic w/insulin pump  Level II completed  26 wk Growth US scheduled   Fetal Echo -completed today    Denies Leaking of Fluid, vaginal bleeding, contractions  +Fetal Movement

## 2025-04-04 NOTE — PROGRESS NOTES
The patient was seen today for an ultrasound.  Please see ultrasound report (located under Ob Procedures) for additional details.   Thank you very much for allowing us to participate in the care of this very nice patient.  Should you have any questions, please do not hesitate to contact me.     Samuel Kapadia MD FACOG  Attending Physician, Maternal-Fetal Medicine  [facility]

## 2025-04-07 NOTE — PROGRESS NOTES
Good FM  No VB or cramping  Normal level II and fetal echo  Checking in with her Endocrinologist - HgA1C ordered  Growth sono scheduled  28 week lab order given

## 2025-04-18 LAB
ALBUMIN SERPL-MCNC: 3.1 G/DL (ref 3.9–4.9)
ALP SERPL-CCNC: 60 IU/L (ref 44–121)
ALT SERPL-CCNC: 8 IU/L (ref 0–32)
AST SERPL-CCNC: 16 IU/L (ref 0–40)
BASOPHILS # BLD AUTO: 0.1 X10E3/UL (ref 0–0.2)
BASOPHILS NFR BLD AUTO: 0 %
BILIRUB SERPL-MCNC: 0.4 MG/DL (ref 0–1.2)
BUN SERPL-MCNC: 7 MG/DL (ref 6–20)
BUN/CREAT SERPL: 13 (ref 9–23)
CALCIUM SERPL-MCNC: 8.7 MG/DL (ref 8.7–10.2)
CHLORIDE SERPL-SCNC: 105 MMOL/L (ref 96–106)
CO2 SERPL-SCNC: 21 MMOL/L (ref 20–29)
CREAT SERPL-MCNC: 0.55 MG/DL (ref 0.57–1)
EGFR: 122 ML/MIN/1.73
EOSINOPHIL # BLD AUTO: 0.6 X10E3/UL (ref 0–0.4)
EOSINOPHIL NFR BLD AUTO: 5 %
ERYTHROCYTE [DISTWIDTH] IN BLOOD BY AUTOMATED COUNT: 12 % (ref 11.7–15.4)
GLOBULIN SER-MCNC: 3.3 G/DL (ref 1.5–4.5)
GLUCOSE SERPL-MCNC: 76 MG/DL (ref 70–99)
HBA1C MFR BLD: 4.7 % (ref 4.8–5.6)
HCT VFR BLD AUTO: 32.9 % (ref 34–46.6)
HGB BLD-MCNC: 11 G/DL (ref 11.1–15.9)
IMM GRANULOCYTES # BLD: 0.1 X10E3/UL (ref 0–0.1)
IMM GRANULOCYTES NFR BLD: 1 %
LYMPHOCYTES # BLD AUTO: 2.7 X10E3/UL (ref 0.7–3.1)
LYMPHOCYTES NFR BLD AUTO: 24 %
MCH RBC QN AUTO: 30.1 PG (ref 26.6–33)
MCHC RBC AUTO-ENTMCNC: 33.4 G/DL (ref 31.5–35.7)
MCV RBC AUTO: 90 FL (ref 79–97)
MONOCYTES # BLD AUTO: 1 X10E3/UL (ref 0.1–0.9)
MONOCYTES NFR BLD AUTO: 8 %
NEUTROPHILS # BLD AUTO: 7.1 X10E3/UL (ref 1.4–7)
NEUTROPHILS NFR BLD AUTO: 62 %
PLATELET # BLD AUTO: 216 X10E3/UL (ref 150–450)
POTASSIUM SERPL-SCNC: 4 MMOL/L (ref 3.5–5.2)
PROT SERPL-MCNC: 6.4 G/DL (ref 6–8.5)
RBC # BLD AUTO: 3.65 X10E6/UL (ref 3.77–5.28)
SODIUM SERPL-SCNC: 138 MMOL/L (ref 134–144)
WBC # BLD AUTO: 11.5 X10E3/UL (ref 3.4–10.8)

## 2025-04-19 LAB
BASOPHILS # BLD AUTO: 0 X10E3/UL (ref 0–0.2)
BASOPHILS NFR BLD AUTO: 0 %
EOSINOPHIL # BLD AUTO: 0.5 X10E3/UL (ref 0–0.4)
EOSINOPHIL NFR BLD AUTO: 4 %
ERYTHROCYTE [DISTWIDTH] IN BLOOD BY AUTOMATED COUNT: 12 % (ref 11.7–15.4)
FERRITIN SERPL-MCNC: 10 NG/ML (ref 15–150)
FOLATE SERPL-MCNC: >20 NG/ML
HCT VFR BLD AUTO: 33.2 % (ref 34–46.6)
HGB BLD-MCNC: 11.1 G/DL (ref 11.1–15.9)
IMM GRANULOCYTES # BLD: 0.1 X10E3/UL (ref 0–0.1)
IMM GRANULOCYTES NFR BLD: 1 %
IRON SATN MFR SERPL: 13 % (ref 15–55)
IRON SERPL-MCNC: 60 UG/DL (ref 27–159)
LYMPHOCYTES # BLD AUTO: 2.6 X10E3/UL (ref 0.7–3.1)
LYMPHOCYTES NFR BLD AUTO: 23 %
MCH RBC QN AUTO: 30.8 PG (ref 26.6–33)
MCHC RBC AUTO-ENTMCNC: 33.4 G/DL (ref 31.5–35.7)
MCV RBC AUTO: 92 FL (ref 79–97)
MONOCYTES # BLD AUTO: 0.9 X10E3/UL (ref 0.1–0.9)
MONOCYTES NFR BLD AUTO: 8 %
NEUTROPHILS # BLD AUTO: 7.2 X10E3/UL (ref 1.4–7)
NEUTROPHILS NFR BLD AUTO: 64 %
PLATELET # BLD AUTO: 222 X10E3/UL (ref 150–450)
RBC # BLD AUTO: 3.6 X10E6/UL (ref 3.77–5.28)
RETICS/RBC NFR AUTO: 1.7 % (ref 0.6–2.6)
RPR SER QL: NON REACTIVE
T4 FREE SERPL-MCNC: 1.17 NG/DL (ref 0.82–1.77)
TIBC SERPL-MCNC: 459 UG/DL (ref 250–450)
TSH SERPL DL<=0.005 MIU/L-ACNC: 0.69 UIU/ML (ref 0.45–4.5)
UIBC SERPL-MCNC: 399 UG/DL (ref 131–425)
VIT B12 SERPL-MCNC: 305 PG/ML (ref 232–1245)
WBC # BLD AUTO: 11.3 X10E3/UL (ref 3.4–10.8)

## 2025-04-21 ENCOUNTER — RESULTS FOLLOW-UP (OUTPATIENT)
Dept: ENDOCRINOLOGY | Facility: HOSPITAL | Age: 35
End: 2025-04-21

## 2025-04-21 ENCOUNTER — RESULTS FOLLOW-UP (OUTPATIENT)
Age: 35
End: 2025-04-21

## 2025-04-23 ENCOUNTER — ULTRASOUND (OUTPATIENT)
Dept: PERINATAL CARE | Facility: OTHER | Age: 35
End: 2025-04-23
Attending: OBSTETRICS & GYNECOLOGY
Payer: COMMERCIAL

## 2025-04-23 VITALS
HEART RATE: 97 BPM | SYSTOLIC BLOOD PRESSURE: 118 MMHG | DIASTOLIC BLOOD PRESSURE: 66 MMHG | BODY MASS INDEX: 35.33 KG/M2 | HEIGHT: 62 IN | WEIGHT: 192 LBS

## 2025-04-23 DIAGNOSIS — Z3A.26 26 WEEKS GESTATION OF PREGNANCY: Primary | ICD-10-CM

## 2025-04-23 DIAGNOSIS — E10.319 PRE-EXISTING TYPE 1 DIABETES MELLITUS WITH RETINOPATHY DURING PREGNANCY IN SECOND TRIMESTER (HCC): ICD-10-CM

## 2025-04-23 DIAGNOSIS — O24.012 PRE-EXISTING TYPE 1 DIABETES MELLITUS WITH RETINOPATHY DURING PREGNANCY IN SECOND TRIMESTER (HCC): ICD-10-CM

## 2025-04-23 PROCEDURE — 99213 OFFICE O/P EST LOW 20 MIN: CPT | Performed by: OBSTETRICS & GYNECOLOGY

## 2025-04-23 PROCEDURE — 76816 OB US FOLLOW-UP PER FETUS: CPT | Performed by: OBSTETRICS & GYNECOLOGY

## 2025-04-23 NOTE — PROGRESS NOTES
The patient was seen today for an ultrasound.  Please see ultrasound report (located under Ob Procedures) for additional details.   Thank you very much for allowing us to participate in the care of this very nice patient.  Should you have any questions, please do not hesitate to contact me.     Samuel Kapadia MD FACOG  Attending Physician, Maternal-Fetal Medicine  [facility]

## 2025-05-02 ENCOUNTER — ROUTINE PRENATAL (OUTPATIENT)
Age: 35
End: 2025-05-02
Payer: COMMERCIAL

## 2025-05-02 VITALS — WEIGHT: 193.4 LBS | DIASTOLIC BLOOD PRESSURE: 72 MMHG | SYSTOLIC BLOOD PRESSURE: 112 MMHG | BODY MASS INDEX: 35.37 KG/M2

## 2025-05-02 DIAGNOSIS — Z34.83 PRENATAL CARE, SUBSEQUENT PREGNANCY, THIRD TRIMESTER: Primary | ICD-10-CM

## 2025-05-02 LAB
SL AMB  POCT GLUCOSE, UA: NORMAL
SL AMB POCT URINE PROTEIN: NORMAL

## 2025-05-02 PROCEDURE — PNV: Performed by: OBSTETRICS & GYNECOLOGY

## 2025-05-02 PROCEDURE — 81002 URINALYSIS NONAUTO W/O SCOPE: CPT | Performed by: OBSTETRICS & GYNECOLOGY

## 2025-05-02 RX ORDER — FERROUS SULFATE 7.5 MG/0.5
SYRINGE (EA) ORAL
COMMUNITY
Start: 2025-04-25

## 2025-05-02 NOTE — PROGRESS NOTES
28w1d  28wk labs completed-  started Iron w/vit C  Pre-existing Type 1 diabetic w/insulin pump  Level II completed  Follow up Growth scan scheduled  Will be a Planned C/S   Fetal Echo -completed     Yellow folder given today  Delivery Consent signed today  Does not plan to breastfeed    Denies Leaking of Fluid, vaginal bleeding, contractions  +Fetal Movement

## 2025-05-02 NOTE — PROGRESS NOTES
Good FM  No VB or cramping  Last HgA1C 4.7  Has sonos and APS scheduled  Delivery consent signed today  Will reach out to [facility] re: EGA for delivery

## 2025-05-14 ENCOUNTER — CLINICAL SUPPORT (OUTPATIENT)
Age: 35
End: 2025-05-14

## 2025-05-14 DIAGNOSIS — E10.40 TYPE 1 DIABETES MELLITUS WITH DIABETIC NEUROPATHY (HCC): ICD-10-CM

## 2025-05-14 DIAGNOSIS — Z32.2 ENCOUNTER FOR CHILDBIRTH INSTRUCTION: Primary | ICD-10-CM

## 2025-05-14 RX ORDER — INSULIN LISPRO 100 [IU]/ML
100 INJECTION, SOLUTION INTRAVENOUS; SUBCUTANEOUS DAILY
Qty: 30 ML | Refills: 5 | Status: SHIPPED | OUTPATIENT
Start: 2025-05-14

## 2025-05-14 NOTE — TELEPHONE ENCOUNTER
Reason for call:   [x] Refill   [] Prior Auth  [] Other:     Office:   [] PCP/Provider -   [x] Specialty/Provider -     Medication: insulin lispro (HumALOG/ADMELOG) 100 units/mL injection    Dose/Frequency: : Inject 100 Units under the skin daily,     Quantity: 30 mL    Pharmacy: [facility] #[id number] - [place] -     Local Pharmacy   Does the patient have enough for 3 days?   [x] Yes   [] No - Send as HP to POD    Mail Away Pharmacy   Does the patient have enough for 10 days?   [] Yes   [] No - Send as HP to POD

## 2025-05-16 ENCOUNTER — ROUTINE PRENATAL (OUTPATIENT)
Age: 35
End: 2025-05-16
Payer: COMMERCIAL

## 2025-05-16 VITALS — DIASTOLIC BLOOD PRESSURE: 72 MMHG | SYSTOLIC BLOOD PRESSURE: 106 MMHG | WEIGHT: 194 LBS | BODY MASS INDEX: 35.48 KG/M2

## 2025-05-16 DIAGNOSIS — Z34.83 PRENATAL CARE, SUBSEQUENT PREGNANCY, THIRD TRIMESTER: Primary | ICD-10-CM

## 2025-05-16 LAB
SL AMB  POCT GLUCOSE, UA: 250
SL AMB POCT URINE PROTEIN: NEGATIVE

## 2025-05-16 PROCEDURE — 81002 URINALYSIS NONAUTO W/O SCOPE: CPT | Performed by: OBSTETRICS & GYNECOLOGY

## 2025-05-16 PROCEDURE — PNV: Performed by: OBSTETRICS & GYNECOLOGY

## 2025-05-16 NOTE — PROGRESS NOTES
30w1d  28wk labs completed-  started Iron w/vit C  Pre-existing Type 1 diabetic w/insulin pump  Level II completed  Follow up growth scan scheduled  Plan C/S  Has started a new pump on May 10th- Endo is involved in care and would prefer to see them    Fetal Echo -completed     Does not plan to breastfeed    Denies Leaking of Fluid, vaginal bleeding, contractions  +Fetal Movement

## 2025-05-16 NOTE — PROGRESS NOTES
Type 1 DM with retinopathy.     - having some struggles with control recently, not absorbing her insulin nearly as well and requiring much higher amounts.  Last A1c excellent, last growth 45%   - prefers to manage on own, and tries for tight control.  Aware that needs are going to be higher in third trimester.      - M US appointments are scheduled.     Delivery timing:  likely aiming for 37-38wks due to retinopathy.  She requests July 7-10 with me.   Will look into scheduling.  Does NOT plan tubal.  Will consider vasectomy versus birth control pills.     Plans to formula feed - has specific one desired in hospital, will send me name so I can see if stocked at  or if she should bring.     Would like to use pump during surgery/manage her own insulin/PP period.     Baby is active.  Reviewed kick counts/when to call.  No bleeding, LOF, contractions.

## 2025-05-28 ENCOUNTER — TELEPHONE (OUTPATIENT)
Age: 35
End: 2025-05-28

## 2025-05-28 ENCOUNTER — ROUTINE PRENATAL (OUTPATIENT)
Age: 35
End: 2025-05-28
Payer: COMMERCIAL

## 2025-05-28 VITALS — SYSTOLIC BLOOD PRESSURE: 114 MMHG | DIASTOLIC BLOOD PRESSURE: 78 MMHG | WEIGHT: 196.2 LBS | BODY MASS INDEX: 35.89 KG/M2

## 2025-05-28 DIAGNOSIS — O24.013 PREGNANCY COMPLICATED BY PRE-EXISTING TYPE 1 DIABETES IN THIRD TRIMESTER: ICD-10-CM

## 2025-05-28 DIAGNOSIS — Z34.83 PRENATAL CARE, SUBSEQUENT PREGNANCY, THIRD TRIMESTER: Primary | ICD-10-CM

## 2025-05-28 DIAGNOSIS — Z23 NEED FOR TDAP VACCINATION: ICD-10-CM

## 2025-05-28 DIAGNOSIS — E10.3493: ICD-10-CM

## 2025-05-28 LAB
SL AMB  POCT GLUCOSE, UA: NEGATIVE
SL AMB POCT URINE PROTEIN: NEGATIVE

## 2025-05-28 PROCEDURE — 90471 IMMUNIZATION ADMIN: CPT | Performed by: OBSTETRICS & GYNECOLOGY

## 2025-05-28 PROCEDURE — 90715 TDAP VACCINE 7 YRS/> IM: CPT | Performed by: OBSTETRICS & GYNECOLOGY

## 2025-05-28 PROCEDURE — 81002 URINALYSIS NONAUTO W/O SCOPE: CPT | Performed by: OBSTETRICS & GYNECOLOGY

## 2025-05-28 PROCEDURE — PNV: Performed by: OBSTETRICS & GYNECOLOGY

## 2025-05-28 NOTE — ASSESSMENT & PLAN NOTE
Growth scan and NST on Friday.  For twice weekly testing starting at that time, is already scheduled with [facility].   Has had improvement in blood sugars since changing to steel pump site.  Has more on order.     Lab Results   Component Value Date    HGBA1C 4.7 (L) 04/18/2025

## 2025-05-28 NOTE — TELEPHONE ENCOUNTER
Spoke w/pt in regards to scheduled C/S date/time.  Scheduled at [facility] L/D 25 at 8am w/Dr Vale.  Pt aware and agreeable PPV scheduled     ----- Message from Lisseth Vale MD sent at 2025  4:23 PM EDT -----  Regarding:   Procedure to be scheduled (IOL or CS): CSEDD: Estimated Date of Delivery: 25Indication for delivery: Type 1 DM with retinopathy, Prior C-SectionRequested date (s) of delivery: 25 at 0800If requested date is unavailable, is there a date by which the pt must be delivered?  Let me know if this spot not availablePhysician preference: Akanksha (Stone is preference  but will be on leave) If IOL, anticipated method: n/aIf CS, with or without tubal: CS without tubal

## 2025-05-28 NOTE — TELEPHONE ENCOUNTER
3RD TRIMESTER CHECK-IN CALL      Overall how are you feeling? Patient states she is doing well.    Compliant with routine OB appointments? Yes    Have you completed your 3rd trimester lab work? Yes    Have you reviewed the contents of 3rd trimester folder from office?  Yes    Have you decided on a pediatrician? Yes     If yes, who:  [facility] [place]    Questions on paperwork to go back to office? No    Questions on the baby birth certificate forms? No    Sent link for the Hospital Readiness Video via [employer]

## 2025-05-28 NOTE — ASSESSMENT & PLAN NOTE
Saw eye doc this past week, right eye with mild hemorrhage.  No changes to care, other than would advise against vaginal birth.   is already planned.     Lab Results   Component Value Date    HGBA1C 4.7 (L) 2025

## 2025-05-28 NOTE — PROGRESS NOTES
3rd Trimester Visit  Name: Fang Farrell      : 1990      MRN: [id number]  Encounter Provider: Lisseth Malcolm MD  Encounter Date: 2025   Encounter department: [facility] [place]    34 y.o.  at 31w6d presenting for routine OB visit at 31w6d.:  Assessment & Plan  Prenatal care, subsequent pregnancy, third trimester  TDAP today.   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  No changes to care, other than would advise against vaginal birth.   is already planned.     Lab Results   Component Value Date    HGBA1C 4.7 (L) 2025            Pregnancy complicated by pre-existing type 1 diabetes in third trimester  Growth scan and NST on Friday.  For twice weekly testing starting at that time, is [facility] with [facility].   Has had improvement in blood sugars since changing to steel pump site.  Has more on order.     Lab Results   Component Value Date    HGBA1C 4.7 (L) 2025                Scheduled for ultrasound on Friday.  Reviewed premature labor precautions and fetal kick counts.  Advised to continue medications and return in 2 weeks.    History of Present Illness     Denies uterine contractions.  Denies vaginal bleeding or leaking of fluid.  Reports adequate fetal movement of at least 10 movements in 2 hours once daily.         Current Outpatient Medications   Medication Instructions    BABY ASPIRIN  mg, Daily  (0200)    Continuous Glucose Sensor (Dexcom G7 Sensor) 1 Device, Does not apply, Every 10 days    ferrous sulfate (Iron Supplement) 75 (15 Fe) mg/mL drops     folic acid (FOLVITE) 4,000 mcg, Oral, Daily    Glucagon, rDNA, (Glucagon Emergency) 1 MG KIT Inject as directed    insulin lispro (HUMALOG/ADMELOG) 100 Units, Subcutaneous, Daily    levothyroxine 150 mcg tablet Take 1 tablet - 6 days  per week.    Prenatal Vit-Fe Fumarate-FA (PRENATAL VITAMIN PO) Take by mouth     Objective   /78   Wt 89 kg (196 lb 3.2 oz)   LMP 10/15/2024 (Approximate)   BMI 35.89 kg/m²      BP: Blood Pressure: 114/78  Wt: 89 kg (196 lb 3.2 oz); Body mass index is 35.89 kg/m².; TWG=11 kg (24 lb 3.2 oz)  Fetal Heart Rate: 133; Fundal Height (cm): 32 cm    Abdomen: soft, non-tender        Pregnancy Problems (from 12/13/24 to present)       Problem Noted Diagnosed Resolved    Pre-existing type 1 diabetes mellitus with retinopathy during pregnancy in second trimester (HCC) 1/15/2025 by LAKESHA Cardenas  No    Overview Signed 1/15/2025 12:56 PM by LAKESHA Cardenas   -1st trimester A1c 5.7%.  -Baseline UPCR 0.07. Creatinine 0.73; .  -Humalog via T slim insulin pump with Dexcom G7 CGM in and out of Control IQ.   -Refer to download for current insulin pump settings.  -Serial fetal growth ultrasounds.  -20 weeks detailed fetal growth.  -22-24 weeks fetal echo.  -32 weeks, NST twice a week and BART weekly.          13 weeks gestation of pregnancy 1/15/2025 by LAKESHA Cardenas  No    Hypothyroidism affecting pregnancy in first trimester 1/15/2025 by LAKESHA Cardenas  No    Overview Signed 1/15/2025  1:00 PM by LAKESHA Cardenas   -11/25/2024 TSH 1.39 nl, FT4 1.37.  -On Levothyroxine 150 mcg.   -Followed by endocrinology.  -Pending repeat TSH/free T4.          BMI 31.0-31.9,adult 1/30/2023 by LAKESHA Cardenas  No    Overview Signed 1/15/2025 12:58 PM by LAKESHA Cardenas   -Pre-pregnancy weight 172 lbs.  -Current weight 178 lbs.  -Starting BMI>30, recommended weight gain 11-20 lbs.

## 2025-05-28 NOTE — PROGRESS NOTES
PN visit    31w6d  Pre-existing Type 1 diabetic w/insulin pump  Level II completed  Follow up growth scan scheduled  Plan C/S  Has started a new pump on May 10th- Endo is involved in care and would prefer to see them- sugars have been normal since last visit.     Fetal Echo -completed      Does not plan to breastfeed- specific formula requested for hospital - Similac NeoSure     Denies loss of fluid, contractions or bleeding  +Fetal movement    EPDS: 4

## 2025-05-30 ENCOUNTER — ULTRASOUND (OUTPATIENT)
Dept: PERINATAL CARE | Facility: OTHER | Age: 35
End: 2025-05-30
Attending: OBSTETRICS & GYNECOLOGY
Payer: COMMERCIAL

## 2025-05-30 VITALS
WEIGHT: 195.2 LBS | BODY MASS INDEX: 35.92 KG/M2 | SYSTOLIC BLOOD PRESSURE: 118 MMHG | HEIGHT: 62 IN | DIASTOLIC BLOOD PRESSURE: 62 MMHG | HEART RATE: 104 BPM

## 2025-05-30 DIAGNOSIS — O24.013 PREGNANCY COMPLICATED BY PRE-EXISTING TYPE 1 DIABETES IN THIRD TRIMESTER: ICD-10-CM

## 2025-05-30 DIAGNOSIS — Z3A.32 32 WEEKS GESTATION OF PREGNANCY: ICD-10-CM

## 2025-05-30 DIAGNOSIS — Z36.89 ENCOUNTER FOR ULTRASOUND TO CHECK FETAL GROWTH: ICD-10-CM

## 2025-05-30 DIAGNOSIS — E10.3493: Primary | ICD-10-CM

## 2025-05-30 PROCEDURE — 99214 OFFICE O/P EST MOD 30 MIN: CPT | Performed by: OBSTETRICS & GYNECOLOGY

## 2025-05-30 PROCEDURE — 59025 FETAL NON-STRESS TEST: CPT | Performed by: OBSTETRICS & GYNECOLOGY

## 2025-05-30 PROCEDURE — 76816 OB US FOLLOW-UP PER FETUS: CPT | Performed by: OBSTETRICS & GYNECOLOGY

## 2025-05-30 PROCEDURE — NC001 PR NO CHARGE: Performed by: OBSTETRICS & GYNECOLOGY

## 2025-05-30 NOTE — ASSESSMENT & PLAN NOTE
Lab Results   Component Value Date    HGBA1C 4.7 (L) 04/18/2025     Has not seen endocrinologist in 2 months.  Unwilling to see our Diabetes in Pregnancy program.  I recommend she not self-manage her diabetes.  By A1C review she is well controlled therefore 39 weeks would be advised however she had retinopathy therefore individualizing her timing is reasonable and she is planned for 38 weeks.    She is not reporting her blood glucoses to anyone for review.  If no one is monitoring her, no trends I recommend against this strategy and recommended she call at a minimum her obstetrician with blood glucose abnormalities.  She inquired about placental insufficiency from blood glucose trends however did not notify anyone about this.  Encouraged her to reconsider her current strategy.    Advised twice weekly testing continue and a repeat growth in 4 weeks.

## 2025-05-30 NOTE — PROGRESS NOTES
[facility]: Fang was seen today for NST (found under the pregnancy episode) which I reviewed the RN assessment and agree, and fetal growth ultrasound (report with images are contained in the ultrasound report located under Chart Review tab in Epic)..       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  Unwilling to see our Diabetes in Pregnancy program.  I recommend she not self-manage her diabetes.  By A1C review she is well controlled therefore 39 weeks would be advised however she had retinopathy therefore individualizing her timing is reasonable and she is planned for 38 weeks.    She is not reporting her blood glucoses to anyone for review.  If no one is monitoring her, no trends I recommend against this strategy and recommended she call at a minimum her obstetrician with blood glucose abnormalities.  She inquired about placental insufficiency from blood glucose trends however did not notify anyone about this.  Encouraged her to reconsider her current strategy.    Advised twice weekly testing continue and a repeat growth in 4 weeks.          Other Visit Diagnoses         32 weeks gestation of pregnancy          Encounter for ultrasound to check fetal growth              The time spent on this established patient on the encounter date included 5 minutes previsit service time reviewing records and precharting, 10 minutes face-to-face service time counseling regarding results and coordinating care, and  5 minutes charting, totalling 20 minutes.  Please don't hesitate to contact our office with any concerns or  questions.  -Maia Onofre MD

## 2025-05-30 NOTE — LETTER
2025    Halina Bagley MD  [address] [facility] [address]    Patient: Fang Farrell   YOB: 1990   Date of Visit: 2025   Nature of this communication: Routine though please note not reporting glucoses to anyone.     This patient was seen recently in our  office.      Please don't hesitate to contact our office with any concerns or questions.     Sincerely,      Maia Onofre MD  Attending Physician, Maternal-Fetal Medicine  [facility]

## 2025-05-30 NOTE — PROGRESS NOTES
Repeat Non-Stress Testing:    Patient verbalizes +FM. Pt denies ALL:               Leaking of fluid   Contractions   Vaginal bleeding   Decreased fetal movement    Patient is performing daily kick counts. Patient has no questions or concerns.   NST strip reviewed by Dr. Onofre in-person.

## 2025-06-03 NOTE — PATIENT INSTRUCTIONS
Kick Counts in Pregnancy   AMBULATORY CARE:   Kick counts  measure how much your baby is moving in your womb. A kick from your baby can be felt as a twist, turn, swish, roll, or jab. It is common to feel your baby kicking at 26 to 28 weeks of pregnancy. You may feel your baby kick as early as 20 weeks of pregnancy. You may want to start counting at 28 weeks.   Contact your doctor immediately if:   You feel a change in the number of kicks or movements of your baby.      You feel fewer than 10 kicks within 2 hours.      You have questions or concerns about your baby's movements.     Why measure kick counts:  Your baby's movement may provide information about your baby's health. He or she may move less, or not at all, if there are problems. Your baby may move less if he or she is not getting enough oxygen or nutrition from the placenta. Do not smoke while you are pregnant. Smoking decreases the amount of oxygen that gets to your baby. Talk to your healthcare provider if you need help to quit smoking. Tell your healthcare provider as soon as you feel a change in your baby's movements.  When to measure kick counts:   Measure kick counts at the same time every day.       Measure kick counts when your baby is awake and most active. Your baby may be most active in the evening.     How to measure kick counts:  Check that your baby is awake before you measure kick counts. You can wake up your baby by lightly pushing on your belly, walking, or drinking something cold. Your healthcare provider may tell you different ways to measure kick counts. You may be told to do the following:  Use a chart or clock to keep track of the time you start and finish counting.      Sit in a chair or lie on your left side.      Place your hands on the largest part of your belly.      Count until you reach 10 kicks. Write down how much time it takes to count 10 kicks.      It may take 30 minutes to 2 hours to count 10 kicks. It should not take more  than 2 hours to count 10 kicks.     Follow up with your doctor as directed:  Write down your questions so you remember to ask them during your visits.   ©  Mer2023 Information is for End User's use only and may not be sold, redistributed or otherwise used for commercial purposes.  The above information is an  only. It is not intended as medical advice for individual conditions or treatments. Talk to your doctor, nurse or pharmacist before following any medical regimen to see if it is safe and effective for you. Thank you for choosing us for your  care today.  If you have any questions about your ultrasound or care, please do not hesitate to contact us or your primary obstetrician.        Some general instructions for your pregnancy are:    Exercise: Aim for 150 minutes per week of regular exercise.  Walking is great!  Nutrition: Choose healthy sources of calcium, iron, and protein.  Avoid ultraprocessed foods and added sugar.  Learn about Preeclampsia: preeclampsia is a common, potentially serious high blood pressure complication in pregnancy.  A blood pressure of 140mmHg (systolic or top number) or 90mmHg (diastolic or bottom number) should be evaluated by your doctor.  Aspirin is sometimes prescribed in early pregnancy to prevent preeclampsia in women with risk factors - ask your obstetrician if you should be on this medication.  For more resources, visit:  https://www.highriskpregnancyinfo.org/preeclampsia  If you smoke, please try to quit completely but also try to reduce your smoking by as much as possible (as soon as possible).  Do not vape.  Please also avoid cannabis products.  Other warning signs to watch out for in pregnancy or postpartum: chest pain, obstructed breathing or shortness of breath, seizures, thoughts of hurting yourself or your baby, bleeding, a painful or swollen leg, fever, or headache (see AWHONN POST-BIRTH Warning Signs campaign).  If these happen  call 911.  Itching is also not normal in pregnancy and if you experience this, especially over your hands and feet, potentially worse at night, notify your doctors.     Nonstress Test for Pregnancy   WHAT YOU NEED TO KNOW:   What do I need to know about a nonstress test?  A nonstress test measures your baby's heart rate and movements. Nonstress means that no stress will be placed on your baby during the test.  How do I prepare for a nonstress test?  Your healthcare provider will talk to you about how to prepare for this test. Your provider may tell you to eat and drink plenty of liquids before your test. If you smoke, you may be asked not to smoke within 2 hours before the test. Your provider will also tell you which medicines to take or not take on the day of your test.  What will happen during a nonstress test?  You may be asked to lie down or recline back for the test on a bed. One or 2 belts with sensors will be placed around your abdomen. Your baby's heart rate will be recorded with a machine. If your baby does not move, your baby may be asleep. Your healthcare provider may make a noise near your abdomen to try to wake your baby. The test usually takes about 20 minutes, but can take longer if your baby needs to be awakened.        What do I need to know about the test results?  Your baby will be expected to move at least 2 times for a certain amount of time. Your baby's heart rate will be expected to go up by a certain number of beats per minute during movement. If your baby does not move as expected, the test may need to be repeated or you may need other tests.  CARE AGREEMENT:   You have the right to help plan your care. Learn about your health condition and how it may be treated. Discuss treatment options with your healthcare providers to decide what care you want to receive. You always have the right to refuse treatment. The above information is an  only. It is not intended as medical advice  for individual conditions or treatments. Talk to your doctor, nurse or pharmacist before following any medical regimen to see if it is safe and effective for you.  © Copyright [employer] 2023 Information is for End User's use only and may not be sold, redistributed or otherwise used for commercial purposes. Kick Counts in Pregnancy   AMBULATORY CARE:   Kick counts  measure how much your baby is moving in your womb. A kick from your baby can be felt as a twist, turn, swish, roll, or jab. It is common to feel your baby kicking at 26 to 28 weeks of pregnancy. You may feel your baby kick as early as 20 weeks of pregnancy. You may want to start counting at 28 weeks.   Contact your doctor immediately if:   You feel a change in the number of kicks or movements of your baby.      You feel fewer than 10 kicks within 2 hours.      You have questions or concerns about your baby's movements.     Why measure kick counts:  Your baby's movement may provide information about your baby's health. He or she may move less, or not at all, if there are problems. Your baby may move less if he or she is not getting enough oxygen or nutrition from the placenta. Do not smoke while you are pregnant. Smoking decreases the amount of oxygen that gets to your baby. Talk to your healthcare provider if you need help to quit smoking. Tell your healthcare provider as soon as you feel a change in your baby's movements.  When to measure kick counts:   Measure kick counts at the same time every day.       Measure kick counts when your baby is awake and most active. Your baby may be most active in the evening.     How to measure kick counts:  Check that your baby is awake before you measure kick counts. You can wake up your baby by lightly pushing on your belly, walking, or drinking something cold. Your healthcare provider may tell you different ways to measure kick counts. You may be told to do the following:  Use a chart or clock to keep track of the  time you start and finish counting.      Sit in a chair or lie on your left side.      Place your hands on the largest part of your belly.      Count until you reach 10 kicks. Write down how much time it takes to count 10 kicks.      It may take 30 minutes to 2 hours to count 10 kicks. It should not take more than 2 hours to count 10 kicks.     Follow up with your doctor as directed:  Write down your questions so you remember to ask them during your visits.   © Copyright [employer] 2023 Information is for End User's use only and may not be sold, redistributed or otherwise used for commercial purposes.  The above information is an  only. It is not intended as medical advice for individual conditions or treatments. Talk to your doctor, nurse or pharmacist before following any medical regimen to see if it is safe and effective for you.

## 2025-06-04 ENCOUNTER — ROUTINE PRENATAL (OUTPATIENT)
Dept: PERINATAL CARE | Facility: OTHER | Age: 35
End: 2025-06-04
Attending: OBSTETRICS & GYNECOLOGY
Payer: COMMERCIAL

## 2025-06-04 VITALS
BODY MASS INDEX: 35.88 KG/M2 | SYSTOLIC BLOOD PRESSURE: 112 MMHG | DIASTOLIC BLOOD PRESSURE: 58 MMHG | HEART RATE: 102 BPM | HEIGHT: 62 IN | WEIGHT: 195 LBS

## 2025-06-04 DIAGNOSIS — Z3A.32 32 WEEKS GESTATION OF PREGNANCY: Primary | ICD-10-CM

## 2025-06-04 DIAGNOSIS — O24.013 PREGNANCY COMPLICATED BY PRE-EXISTING TYPE 1 DIABETES IN THIRD TRIMESTER: ICD-10-CM

## 2025-06-04 PROCEDURE — 59025 FETAL NON-STRESS TEST: CPT | Performed by: OBSTETRICS & GYNECOLOGY

## 2025-06-04 NOTE — PROGRESS NOTES
Repeat Non-Stress Testing:    Patient verbalizes +FM. Pt denies ALL:               Leaking of fluid   Contractions   Vaginal bleeding   Decreased fetal movement    Patient is performing daily kick counts. Patient has no questions or concerns.   NST strip reviewed by Dr. Kapadia in-person.

## 2025-06-06 ENCOUNTER — ULTRASOUND (OUTPATIENT)
Dept: PERINATAL CARE | Facility: OTHER | Age: 35
End: 2025-06-06
Attending: OBSTETRICS & GYNECOLOGY
Payer: COMMERCIAL

## 2025-06-06 VITALS
SYSTOLIC BLOOD PRESSURE: 108 MMHG | HEART RATE: 96 BPM | BODY MASS INDEX: 35.88 KG/M2 | HEIGHT: 62 IN | WEIGHT: 195 LBS | DIASTOLIC BLOOD PRESSURE: 62 MMHG

## 2025-06-06 DIAGNOSIS — Z3A.34 34 WEEKS GESTATION OF PREGNANCY: ICD-10-CM

## 2025-06-06 DIAGNOSIS — O24.013 PREGNANCY COMPLICATED BY PRE-EXISTING TYPE 1 DIABETES IN THIRD TRIMESTER: Primary | ICD-10-CM

## 2025-06-06 DIAGNOSIS — Z3A.33 33 WEEKS GESTATION OF PREGNANCY: ICD-10-CM

## 2025-06-06 PROCEDURE — 59025 FETAL NON-STRESS TEST: CPT | Performed by: PHYSICIAN ASSISTANT

## 2025-06-06 PROCEDURE — 76815 OB US LIMITED FETUS(S): CPT | Performed by: PHYSICIAN ASSISTANT

## 2025-06-06 NOTE — PROGRESS NOTES
Repeat Non-Stress Testing:    Patient verbalizes +FM. Pt denies ALL:               Leaking of fluid   Contractions   Vaginal bleeding   Decreased fetal movement    Patient is performing daily kick counts. Patient has no questions or concerns.   NST strip reviewed by Dr. Bustos in-person.

## 2025-06-06 NOTE — PROGRESS NOTES
"[facility]:     Fang Farrell was seen today at 33w1d gestational age for NST (found under the pregnancy episode). NST was reactive. I reviewed the RN assessment and agree. She was also seen for BART. Please see ultrasound report under the \"imaging\" tab.     She will continue with twice weekly NST/BART for the indication of type 1 diabetes mellitus.     Carolynn Garcia PA-C    "

## 2025-06-07 LAB
T4 FREE SERPL-MCNC: 1.29 NG/DL (ref 0.82–1.77)
TSH SERPL DL<=0.005 MIU/L-ACNC: 1.42 UIU/ML (ref 0.45–4.5)

## 2025-06-09 ENCOUNTER — NURSE TRIAGE (OUTPATIENT)
Age: 35
End: 2025-06-09

## 2025-06-09 ENCOUNTER — ROUTINE PRENATAL (OUTPATIENT)
Dept: PERINATAL CARE | Facility: OTHER | Age: 35
End: 2025-06-09
Attending: OBSTETRICS & GYNECOLOGY
Payer: COMMERCIAL

## 2025-06-09 VITALS
BODY MASS INDEX: 35.96 KG/M2 | DIASTOLIC BLOOD PRESSURE: 62 MMHG | HEART RATE: 102 BPM | SYSTOLIC BLOOD PRESSURE: 118 MMHG | HEIGHT: 62 IN | WEIGHT: 195.4 LBS

## 2025-06-09 DIAGNOSIS — O24.013 TYPE 1 DIABETES MELLITUS DURING PREGNANCY IN THIRD TRIMESTER: Primary | ICD-10-CM

## 2025-06-09 DIAGNOSIS — Z3A.33 33 WEEKS GESTATION OF PREGNANCY: ICD-10-CM

## 2025-06-09 PROCEDURE — 59025 FETAL NON-STRESS TEST: CPT | Performed by: OBSTETRICS & GYNECOLOGY

## 2025-06-09 NOTE — PROGRESS NOTES
Repeat Non-Stress Testing:    Patient verbalizes +FM. Pt denies ALL:               Leaking of fluid   Contractions   Vaginal bleeding   Decreased fetal movement    Patient is performing daily kick counts. Patient has no questions or concerns.   NST strip reviewed by Dr. Grant in-person.

## 2025-06-09 NOTE — TELEPHONE ENCOUNTER
Patient called back, she reports she did her kick counts and the fetal movement was good.  No further complaints.

## 2025-06-09 NOTE — TELEPHONE ENCOUNTER
"REASON FOR CONVERSATION: Pregnancy Problem and Contractions    SYMPTOMS: 3 painful roseann whaley contractions today, less fetal movement than normal, increased vaginal discharge.     OTHER HEALTH INFORMATION: patient seen at [facility] for routine NST today. She feels baby's movements are less than normal, not sure if related to gestational age. Has not performed kick counts. Reports increased vaginal discharge, appears to be like typical discharge for her. Denies watery, thin discharge. Denies vaginitis symptoms. Denies vaginal bleeding.     ESC to Dr. Raymond on call for review/recommendations    PROTOCOL DISPOSITION: Home Care    CARE ADVICE PROVIDED: Perform kick counts, to call back right away if <5 kicks in 1 hour or <10 kicks in 2 hours. Monitor for worsening or more frequent roseann whaley. Education provided on timing contractions, to call back if 4-5 painful contractions in 1 hour. Call back if continuous leakage of fluid occurs or if experiences large gush of fluid.     PRACTICE FOLLOW-UP: none.    Reason for Disposition   Pregnant 23 or more weeks and baby moving less today AND willing to perform kick count   Mild contractions > 10 minutes apart    Additional Information   Pregnant < 37 weeks (i.e., )    Answer Assessment - Initial Assessment Questions  1. ONSET: \"When did the symptoms begin?\"          This morning around 9:15    2. CONTRACTIONS: \"Describe the contractions that you are having.\" (e.g., duration, frequency, regularity, severity)      Just one painful roseann whaley contractions in last hour, 3 in total today. More intense than usual. They are quick and abrupt. Coughing causes a sharp pain. More pressure on her bladder that comes and goes, unsure if related to baby size.    3. PREGNANCY: \"How many weeks pregnant are you?\"      33w4 per chart     4. SHOAIB: \"What date are you expecting to deliver?\"      25 per chart.     6. FETAL MOVEMENT: \"Has the baby's movement decreased or changed " "significantly from normal?\"      Baby is moving, less than normal. Movements feels different. Hasn't done kick counts. Had NST this morning, was moving in beginning, not moving as much at the end.      7. OTHER SYMPTOMS: \"Do you have any other symptoms?\" (e.g., leaking fluid from vagina, fever, hand/facial swelling)      Has had slight discharge, slightly increased discharge today. Reports seems like her normal pregnancy discharge. Denies itching or irritation. Denies leaking clear, watery, odorless fluid.    Answer Assessment - Initial Assessment Questions  1. FETAL MOVEMENT: \"Has the baby's movement decreased or changed significantly from normal?\" (e.g., yes, no; describe)      Feels decreased today. But there is movement, unsure if this is normal as baby is getting bigger/gestational age. Hasn't done kick counts,     2. SHOAIB: \"What date are you expecting to deliver?\"       2025 per chart review.    3. PREGNANCY: \"How many weeks pregnant are you?\"       33w4 per chart review    4. OTHER SYMPTOMS: \"Do you have any other symptoms?\" (e.g., abdominal pain, leaking fluid from vagina, vaginal bleeding, etc.)      Some increased discharge, has had increased discharge whole pregnancy. Denies clear/watery/odorless fluid. [place] contractions, lower abdominal pain when coughing    Protocols used: Pregnancy - Labor-Adult-OH, Pregnancy - Labor - -Adult-OH, Pregnancy - Decreased Fetal Movement-ADULT-OH    "

## 2025-06-09 NOTE — TELEPHONE ENCOUNTER
ESC from Dr. Raymond advised perform kick counts, and increase hydration.     Left voice message for call back.

## 2025-06-11 ENCOUNTER — ANCILLARY PROCEDURE (OUTPATIENT)
Dept: PERINATAL CARE | Facility: OTHER | Age: 35
End: 2025-06-11
Attending: OBSTETRICS & GYNECOLOGY
Payer: COMMERCIAL

## 2025-06-11 ENCOUNTER — ROUTINE PRENATAL (OUTPATIENT)
Age: 35
End: 2025-06-11

## 2025-06-11 VITALS
BODY MASS INDEX: 36.1 KG/M2 | HEIGHT: 62 IN | WEIGHT: 196.2 LBS | HEART RATE: 64 BPM | SYSTOLIC BLOOD PRESSURE: 106 MMHG | DIASTOLIC BLOOD PRESSURE: 68 MMHG

## 2025-06-11 VITALS — DIASTOLIC BLOOD PRESSURE: 66 MMHG | BODY MASS INDEX: 35.89 KG/M2 | SYSTOLIC BLOOD PRESSURE: 106 MMHG | WEIGHT: 196.2 LBS

## 2025-06-11 DIAGNOSIS — Z3A.33 33 WEEKS GESTATION OF PREGNANCY: ICD-10-CM

## 2025-06-11 DIAGNOSIS — Z34.83 PRENATAL CARE, SUBSEQUENT PREGNANCY, THIRD TRIMESTER: Primary | ICD-10-CM

## 2025-06-11 DIAGNOSIS — O24.013 PREGNANCY COMPLICATED BY PRE-EXISTING TYPE 1 DIABETES, THIRD TRIMESTER: Primary | ICD-10-CM

## 2025-06-11 PROCEDURE — 76818 FETAL BIOPHYS PROFILE W/NST: CPT | Performed by: OBSTETRICS & GYNECOLOGY

## 2025-06-11 PROCEDURE — PNV: Performed by: OBSTETRICS & GYNECOLOGY

## 2025-06-11 NOTE — PROGRESS NOTES
33w6d  CS scheduled 7/8/25   Plans to Bottle feed only    Up to date Tdap  Pre-existing Type 1 diabetic w/insulin pump(new pump received 5/10 ) Endo is involved in care and would prefer to see them- sugars have been normal since last visit.    Denies Leaking of Fluid, vaginal bleeding, contractions  [place] contractions not lasting long   +Fetal Movement

## 2025-06-11 NOTE — PROGRESS NOTES
"Please refer to the [facility] ultrasound report in \"Imaging\" in Epic for additional information regarding today's visit  "

## 2025-06-11 NOTE — PROGRESS NOTES
Good FM  Some BH's and intermittent pelvic pressure  PTL precautions reviewed  No VB  Blood sugars well-controlled per patient  Repeat CS scheduled

## 2025-06-11 NOTE — LETTER
"June 11, 2025     Halina Bagley MD  [address] [facility] [id number]    Patient: Fang Farrell   YOB: 1990   Date of Visit: 6/11/2025       Dear Dr. Halina Bagley MD:    Thank you for referring Fang Farrell to me for evaluation. Below are my notes for this consultation.    If you have questions, please do not hesitate to call me. I look forward to following your patient along with you.         Sincerely,        Ajay Grant MD        CC: No Recipients    Ajay Grant MD  6/11/2025  9:57 AM  Sign when Signing Visit  Please refer to the [facility] ultrasound report in \"Imaging\" in Epic for additional information regarding today's visit     "

## 2025-06-12 ENCOUNTER — RESULTS FOLLOW-UP (OUTPATIENT)
Dept: ENDOCRINOLOGY | Facility: HOSPITAL | Age: 35
End: 2025-06-12

## 2025-06-18 ENCOUNTER — ANCILLARY PROCEDURE (OUTPATIENT)
Dept: PERINATAL CARE | Facility: OTHER | Age: 35
End: 2025-06-18
Attending: OBSTETRICS & GYNECOLOGY
Payer: COMMERCIAL

## 2025-06-18 VITALS
BODY MASS INDEX: 36.22 KG/M2 | SYSTOLIC BLOOD PRESSURE: 118 MMHG | HEIGHT: 62 IN | WEIGHT: 196.8 LBS | DIASTOLIC BLOOD PRESSURE: 72 MMHG | HEART RATE: 79 BPM

## 2025-06-18 DIAGNOSIS — Z3A.34 34 WEEKS GESTATION OF PREGNANCY: ICD-10-CM

## 2025-06-18 DIAGNOSIS — O24.013 TYPE 1 DIABETES MELLITUS DURING PREGNANCY IN THIRD TRIMESTER: Primary | ICD-10-CM

## 2025-06-18 PROCEDURE — 59025 FETAL NON-STRESS TEST: CPT | Performed by: OBSTETRICS & GYNECOLOGY

## 2025-06-18 PROCEDURE — 76815 OB US LIMITED FETUS(S): CPT | Performed by: OBSTETRICS & GYNECOLOGY

## 2025-06-20 ENCOUNTER — ROUTINE PRENATAL (OUTPATIENT)
Dept: PERINATAL CARE | Facility: OTHER | Age: 35
End: 2025-06-20
Attending: OBSTETRICS & GYNECOLOGY
Payer: COMMERCIAL

## 2025-06-20 VITALS
SYSTOLIC BLOOD PRESSURE: 114 MMHG | HEIGHT: 62 IN | WEIGHT: 196.2 LBS | BODY MASS INDEX: 36.1 KG/M2 | HEART RATE: 78 BPM | DIASTOLIC BLOOD PRESSURE: 72 MMHG

## 2025-06-20 DIAGNOSIS — Z3A.35 35 WEEKS GESTATION OF PREGNANCY: ICD-10-CM

## 2025-06-20 DIAGNOSIS — O24.013 PREGNANCY COMPLICATED BY PRE-EXISTING TYPE 1 DIABETES IN THIRD TRIMESTER: Primary | ICD-10-CM

## 2025-06-20 PROCEDURE — 59025 FETAL NON-STRESS TEST: CPT | Performed by: OBSTETRICS & GYNECOLOGY

## 2025-06-25 ENCOUNTER — ROUTINE PRENATAL (OUTPATIENT)
Dept: PERINATAL CARE | Facility: OTHER | Age: 35
End: 2025-06-25
Attending: OBSTETRICS & GYNECOLOGY
Payer: COMMERCIAL

## 2025-06-25 ENCOUNTER — ROUTINE PRENATAL (OUTPATIENT)
Age: 35
End: 2025-06-25

## 2025-06-25 VITALS
SYSTOLIC BLOOD PRESSURE: 112 MMHG | HEIGHT: 62 IN | BODY MASS INDEX: 36.44 KG/M2 | WEIGHT: 198 LBS | DIASTOLIC BLOOD PRESSURE: 62 MMHG | HEART RATE: 114 BPM

## 2025-06-25 VITALS — SYSTOLIC BLOOD PRESSURE: 132 MMHG | WEIGHT: 198 LBS | BODY MASS INDEX: 36.21 KG/M2 | DIASTOLIC BLOOD PRESSURE: 72 MMHG

## 2025-06-25 DIAGNOSIS — O24.013 PREGNANCY COMPLICATED BY PRE-EXISTING TYPE 1 DIABETES IN THIRD TRIMESTER: Primary | ICD-10-CM

## 2025-06-25 DIAGNOSIS — Z34.83 PRENATAL CARE, SUBSEQUENT PREGNANCY, THIRD TRIMESTER: Primary | ICD-10-CM

## 2025-06-25 DIAGNOSIS — Z3A.35 35 WEEKS GESTATION OF PREGNANCY: ICD-10-CM

## 2025-06-25 PROCEDURE — PNV: Performed by: OBSTETRICS & GYNECOLOGY

## 2025-06-25 PROCEDURE — 87150 DNA/RNA AMPLIFIED PROBE: CPT | Performed by: OBSTETRICS & GYNECOLOGY

## 2025-06-25 PROCEDURE — 59025 FETAL NON-STRESS TEST: CPT | Performed by: OBSTETRICS & GYNECOLOGY

## 2025-06-25 NOTE — PROGRESS NOTES
35w6d  GBS TODAY!  CS scheduled 7/8/25 w/ Akanksha  Plans to Bottle feed only    Up to date Tdap  Pre-existing Type 1 diabetic w/insulin pump(new pump received 5/10 Endo is involved in care and would prefer to see them- sugars have been in range    Taking last baby aspirin today.    Denies Leaking of Fluid, vaginal bleeding, contractions  +Fetal Movement

## 2025-06-25 NOTE — PROGRESS NOTES
Good FM  Feels tight  No VB  Blood sugars slightly higher recently  Birth plan returned   scheduled

## 2025-06-25 NOTE — PROGRESS NOTES
Repeat Non-Stress Testing:    Patient verbalizes +FM. Pt denies ALL:               Leaking of fluid   Contractions   Vaginal bleeding   Decreased fetal movement    Patient is performing daily kick counts. Patient has no questions or concerns.   NST strip reviewed by Dr. Kapadia in-person.  
The patient had a nonstress test in the office.  I have reviewed the nonstress test and agree with the documentation.  
DISCHARGE

## 2025-06-27 ENCOUNTER — ULTRASOUND (OUTPATIENT)
Dept: PERINATAL CARE | Facility: OTHER | Age: 35
End: 2025-06-27
Attending: OBSTETRICS & GYNECOLOGY
Payer: COMMERCIAL

## 2025-06-27 VITALS
WEIGHT: 196.6 LBS | SYSTOLIC BLOOD PRESSURE: 112 MMHG | DIASTOLIC BLOOD PRESSURE: 64 MMHG | HEIGHT: 62 IN | HEART RATE: 94 BPM | BODY MASS INDEX: 36.18 KG/M2

## 2025-06-27 DIAGNOSIS — O24.013 PREGNANCY COMPLICATED BY PRE-EXISTING TYPE 1 DIABETES IN THIRD TRIMESTER: Primary | ICD-10-CM

## 2025-06-27 DIAGNOSIS — E10.3493: ICD-10-CM

## 2025-06-27 DIAGNOSIS — Z3A.36 36 WEEKS GESTATION OF PREGNANCY: ICD-10-CM

## 2025-06-27 LAB — GP B STREP DNA SPEC QL NAA+PROBE: NEGATIVE

## 2025-06-27 PROCEDURE — 59025 FETAL NON-STRESS TEST: CPT | Performed by: OBSTETRICS & GYNECOLOGY

## 2025-06-27 PROCEDURE — 76815 OB US LIMITED FETUS(S): CPT | Performed by: OBSTETRICS & GYNECOLOGY

## 2025-06-30 ENCOUNTER — ROUTINE PRENATAL (OUTPATIENT)
Dept: PERINATAL CARE | Facility: OTHER | Age: 35
End: 2025-06-30
Attending: OBSTETRICS & GYNECOLOGY
Payer: COMMERCIAL

## 2025-06-30 VITALS
DIASTOLIC BLOOD PRESSURE: 66 MMHG | SYSTOLIC BLOOD PRESSURE: 104 MMHG | WEIGHT: 197.2 LBS | HEART RATE: 95 BPM | HEIGHT: 62 IN | BODY MASS INDEX: 36.29 KG/M2

## 2025-06-30 DIAGNOSIS — Z3A.36 36 WEEKS GESTATION OF PREGNANCY: ICD-10-CM

## 2025-06-30 DIAGNOSIS — O24.013 PREGNANCY COMPLICATED BY PRE-EXISTING TYPE 1 DIABETES IN THIRD TRIMESTER: Primary | ICD-10-CM

## 2025-06-30 PROCEDURE — 59025 FETAL NON-STRESS TEST: CPT | Performed by: PHYSICIAN ASSISTANT

## 2025-06-30 NOTE — PROGRESS NOTES
Repeat Non-Stress Testing:    Patient verbalizes +FM. Pt denies ALL:               Leaking of fluid   Contractions   Vaginal bleeding   Decreased fetal movement    Patient is performing daily kick counts. Patient has no questions or concerns.   NST strip reviewed by MELISSA Genao in-person.

## 2025-06-30 NOTE — PROGRESS NOTES
[facility]:     Fang Farrell was seen today at 36w4d gestational age for NST (found under the pregnancy episode). NST was reactive. I reviewed the RN assessment and agree. She should continue with twice weekly NST and weekly BART for the indication of preexisting type 1 diabetes.    Carolynn Garcia PA-C

## 2025-07-01 ENCOUNTER — OFFICE VISIT (OUTPATIENT)
Dept: ENDOCRINOLOGY | Facility: HOSPITAL | Age: 35
End: 2025-07-01
Payer: COMMERCIAL

## 2025-07-01 VITALS
BODY MASS INDEX: 36.22 KG/M2 | SYSTOLIC BLOOD PRESSURE: 120 MMHG | HEIGHT: 62 IN | WEIGHT: 196.8 LBS | DIASTOLIC BLOOD PRESSURE: 84 MMHG | HEART RATE: 96 BPM

## 2025-07-01 DIAGNOSIS — E10.3493: Primary | ICD-10-CM

## 2025-07-01 DIAGNOSIS — E03.9 HYPOTHYROIDISM, UNSPECIFIED TYPE: ICD-10-CM

## 2025-07-01 PROCEDURE — 99214 OFFICE O/P EST MOD 30 MIN: CPT | Performed by: INTERNAL MEDICINE

## 2025-07-01 PROCEDURE — 95251 CONT GLUC MNTR ANALYSIS I&R: CPT | Performed by: INTERNAL MEDICINE

## 2025-07-01 NOTE — ASSESSMENT & PLAN NOTE
Lab Results   Component Value Date    HGBA1C 4.7 (L) 04/18/2025     Most recent hemoglobin A1c 4.7% which does demonstrate excellent control of her diabetes.  At this point she is continuing the current basal and bolus insulin regimens.  Once she delivers, she will go back to her prepregnancy basal and bolus rates.  She will continue to utilize the tandem t:slim 2 insulin pump with Dexcom continuous glucose monitoring system.    Orders:    Comprehensive metabolic panel; Future    T4, free; Future    TSH, 3rd generation; Future    Hemoglobin A1C; Future    CBC and differential; Future    Lipid Panel with Direct LDL reflex; Future

## 2025-07-01 NOTE — PROGRESS NOTES
Name: Fang Farrell      : 1990      MRN: [id number]  Encounter Provider: Linda Walton MD  Encounter Date: 2025   Encounter department: [facility]    No chief complaint on file.  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  At this point she is continuing the current basal and bolus insulin regimens.  Once she delivers, she will go back to her prepregnancy basal and bolus rates.  She will continue to utilize the tandem t:slim 2 insulin pump with Dexcom continuous glucose monitoring system.    Orders:    Comprehensive metabolic panel; Future    T4, free; Future    TSH, 3rd generation; Future    Hemoglobin A1C; Future    CBC and differential; Future    Lipid Panel with Direct LDL reflex; Future    Hypothyroidism, unspecified type    Most recent thyroid function studies are normal.  She will continue the current dose of levothyroxine 150 mcg 1 tablet 6 days a week.    Orders:    Comprehensive metabolic panel; Future    T4, free; Future    TSH, 3rd generation; Future    Hemoglobin A1C; Future    CBC and differential; Future    Lipid Panel with Direct LDL reflex; Future      She has been asked to follow-up in 3 months which is about 2 months postdelivery with preceding hemoglobin A1c, CMP, CBC, lipid panel, TSH, and free T4.    Assessment & Plan        Pertinent Medical History   Fang Farrell is a 34 y.o. female with type 1 diabetes diagnosed 33 years ago with retinopathy, hypothyroidism seen in follow up.     Beatties complications include retinopathy.  She denies neuropathy, nephropathy, heart attack, stroke, or claudication.        History of Present Illness   History of Present Illness    Fang Farrell is a 34  y.o. female with type 1 diabetes diagnosed 33 years ago with retinopathy, hypothyroidism seen in follow up. Reports complications of retinopathy. Denies recent severe hypoglycemic or severe hyperglycemic episodes. Denies any issues with her current regimen. Last A1C was 4.7. Denies recent illness, hospitalization or steroid use.  She is currently pregnant due for  section 2025.currently 37 weeks. Baby kicking. Not planning on nursing. Female baby.     She has hypothyroidism and takes levothyroxine 150 mcg 1 tablet 6 days a week    She uses a tandem t:slim to insulin pump and Dexcom G7 continuous glucose monitoring system integrated with her insulin pump. Got replacement pump in may 2025 when had pump malfunction.     CGM Interpretation:  Date Range: 2025 through 2025  Device used: Dexcom G6  Type: Type 1 Diabetes  Home use , CGM active 100% of the time   Analysis of data:   Average Glucose: 118 mg/dL  GMI: 6.1%  Coefficient of Variation: 35%  SD: 41 mg/dL  Time in Target Range: 84%   Time Above Range: 9% high, 0.2% very high  Time Below Range: 5.8% low, 1.4% very low  Interpretation of data: Overall Dexcom download shows minor excursions during the day due to decrease in activity and occasional low blood sugars overnight.  More than 72 hours of data was reviewed. Report to be scanned to chart.     Current diabetic regimen:   Humalog insulin via the insulin pump for her diabetes.    She takes levothyroxine 150 mcg 1 tablet 6 days a week for hypothyroidism.    Patient is on a tandem t:slim 2 pump prescribed by endocrinology.  She denies any malfunctioning of the pump.  Manual mode 100% of the time.  Current Insulin pump settings:  Basal rate: 12 AM to 2 AM 0.65 units/h, 2 AM to 4 AM 1.25 units/h, 4 AM to 6 AM 1.75 units/h, 6 AM to 8 AM 1.45 units/h, 8 AM to 9:30 AM 1.3 units/h, 9:30 AM to 1 PM 2.6 units/h, 1 PM to 3:30 PM 2.4 units/h, 3:30 PM to 5:30 PM 1.95 units/h, 5:30 PM to 7:30 PM 1.6  units/h, 7:30 PM to 10 PM 1.55 units/h, 10 PM to 12 AM 1 unit/h.  Insulin to carb ratio: 12 AM to 8 AM 1 unit per 4 g carbohydrate, 8 AM to 9:30 AM 1 unit for 3.8 g carbohydrate, 9:30 AM to 1 PM 1 unit per 3.2 g carbohydrate, 1 PM to 3:30 PM 1 unit per 3.5 g carbohydrate, 3:30 PM to 5:30 PM 1 unit per 4 g carbohydrate, 5:30 PM to 7:30 PM 1 unit per 3.3 g carbohydrate, 7:30 PM to 10 AM 1 unit per 3.7 g carbohydrate, 10 PM to 12 AM 1 unit per 4 g carbohydrate.  Insulin sensitivity factor: 12 AM to 2 AM 1 unit per 40 blood sugar points, 2 AM to 4 AM 1 unit per 38 blood sugar points, 4 AM to 8 AM 1 unit for 35 blood sugar points, 8 AM to 1 PM 1 unit per 30 blood sugar points, 1 PM to 3:30 PM 1 unit per 22 blood sugar points, 3:38 PM to 5:30 PM 1 unit per 25 blood sugar points, 5:30 PM to 7:30 PM 1 unit per 30 blood sugar points, 7:30 PM to 10 PM 1 unit per 40 blood sugar points, 10 PM to 12 AM 1 unit per 30 blood sugar points.  BG target: 90  Active insulin time: 3 hours and 15 minutes  Type of insulin: Humalog insulin  Discussed with patient in case of malfunctioning of the pump to use basal and bolus therapy as backup which is prescribed to the patient. Also notified patient to call clinic if any issues.     Review of Systems   Constitutional:  Positive for fatigue. Negative for unexpected weight change.        Weight gain appropriate. 26 lbs so far.   HENT:  Negative for trouble swallowing.    Eyes:  Negative for visual disturbance.   Respiratory:  Negative for chest tightness and shortness of breath.    Cardiovascular:  Negative for chest pain, palpitations and leg swelling.   Gastrointestinal:  Negative for abdominal pain, constipation, diarrhea and nausea.   Endocrine: Positive for heat intolerance. Negative for cold intolerance.   Skin:  Negative for rash and wound.        No dry skin. No brittle nails. Some hair loss.    Neurological:  Negative for tremors and numbness.   Psychiatric/Behavioral:  Negative for  "sleep disturbance.     as per HPI    Medical History Reviewed by provider this encounter:  Tobacco  Allergies  Meds  Problems  Med Hx  Surg Hx  Fam Hx     .  Medications Ordered Prior to Encounter[1]   Social History[2]     Medical History Reviewed by provider this encounter:  Tobacco  Allergies  Meds  Problems  Med Hx  Surg Hx  Fam Hx     .    Objective   /84   Pulse 96   Ht 5' 2\" (1.575 m)   Wt 89.3 kg (196 lb 12.8 oz)   LMP 10/15/2024 (Approximate)   BMI 36.00 kg/m²      Body mass index is 36 kg/m².  Wt Readings from Last 3 Encounters:   07/03/25 89 kg (196 lb 3.2 oz)   07/02/25 89.3 kg (196 lb 12.8 oz)   07/01/25 89.3 kg (196 lb 12.8 oz)     Physical Exam    Physical Exam  Vitals and nursing note reviewed.   Constitutional:       General: She is not in acute distress.     Appearance: Normal appearance. She is well-developed.   HENT:      Head: Normocephalic and atraumatic.     Eyes:      Conjunctiva/sclera: Conjunctivae normal.     Neck:      Vascular: No carotid bruit.      Comments: Thyroid normal in size.  No palpable thyroid nodules.  Cardiovascular:      Rate and Rhythm: Normal rate and regular rhythm.      Heart sounds: Normal heart sounds. No murmur heard.  Pulmonary:      Effort: Pulmonary effort is normal. No respiratory distress.      Breath sounds: Normal breath sounds. No wheezing.   Abdominal:      Palpations: Abdomen is soft.      Comments: Abdomen gravid.     Musculoskeletal:         General: No swelling.      Cervical back: Neck supple.      Right lower leg: No edema.      Left lower leg: No edema.   Lymphadenopathy:      Cervical: No cervical adenopathy.     Skin:     General: Skin is warm and dry.     Neurological:      Mental Status: She is alert and oriented to person, place, and time.     Psychiatric:         Mood and Affect: Mood normal.       Last Eye Exam: 01/24/2025, saw in 2nd and 3rd trimester.   Last Foot Exam: 01/31/2024  Health Maintenance   Topic Date Due "    Diabetic Foot Exam  01/31/2025    Diabetic Eye Exam  01/24/2026       Results    Labs: I have reviewed pertinent labs including:     Blood work performed on 6/6/2025 showed a TSH of 1.42 with a free T4 of 1.29.    Lab Results   Component Value Date    HGBA1C 4.7 (L) 04/18/2025    HGBA1C 5.2 01/24/2025    HGBA1C 5.7 (H) 11/25/2024      Lab Results   Component Value Date    CREATININE 0.55 (L) 04/18/2025    CREATININE 0.73 01/10/2025    CREATININE 0.87 11/25/2024    BUN 7 04/18/2025    K 4.0 04/18/2025     04/18/2025    CO2 21 04/18/2025      eGFR   Date Value Ref Range Status   04/18/2025 122 >59 mL/min/1.73 Final   08/02/2023 119 ml/min/1.73sq m Final      HDL   Date Value Ref Range Status   11/25/2024 46 >39 mg/dL Final     Triglycerides   Date Value Ref Range Status   11/25/2024 111 0 - 149 mg/dL Final     T. Chol/HDL Ratio   Date Value Ref Range Status   10/23/2023 3.3 0.0 - 4.4 ratio Final     Comment:                                       T. Chol/HDL Ratio                                              Men  Women                                1/2 Avg.Risk  3.4    3.3                                    Avg.Risk  5.0    4.4                                 2X Avg.Risk  9.6    7.1                                 3X Avg.Risk 23.4   11.0        ALT   Date Value Ref Range Status   04/18/2025 8 0 - 32 IU/L Final     AST   Date Value Ref Range Status   04/18/2025 16 0 - 40 IU/L Final     Alkaline Phosphatase   Date Value Ref Range Status   08/02/2023 123 (H) 34 - 104 U/L Final         Lab Results   Component Value Date    FREET4 1.29 06/06/2025        Patient Instructions   The thyroid blood work is normal.     Continue the same levothyroxine dosage.     Last hgba1c was 4.7%.     Continue to use pump and dexcom. Watch the lows overnight.     Go back to pre pregnancy rates after delivery.     Follow up in 3 months with blood work.     Discussed with the patient and all questioned fully answered. She will call me  if any problems arise.           [1]   Current Outpatient Medications on File Prior to Visit   Medication Sig Dispense Refill    Continuous Glucose Sensor (Dexcom G7 Sensor) Use 1 Device every 10 days 9 each 3    ferrous sulfate (Iron Supplement) 75 (15 Fe) mg/mL drops       folic acid (FOLVITE) 1 mg tablet TAKE 4 TABLETS BY MOUTH DAILY 360 tablet 0    Glucagon, rDNA, (Glucagon Emergency) 1 MG KIT Inject as directed      insulin lispro (HumALOG/ADMELOG) 100 units/mL injection Inject 100 Units under the skin daily 30 mL 5    Prenatal Vit-Fe Fumarate-FA (PRENATAL VITAMIN PO) Take by mouth       No current facility-administered medications on file prior to visit.   [2]   Social History  Tobacco Use    Smoking status: Never    Smokeless tobacco: Never   Vaping Use    Vaping status: Never Used   Substance and Sexual Activity    Alcohol use: Not Currently     Comment: I have not had a drink in about 1 year    Drug use: Never    Sexual activity: Yes     Partners: Male     Birth control/protection: None     Comment: Prior used pill

## 2025-07-01 NOTE — ASSESSMENT & PLAN NOTE
Most recent thyroid function studies are normal.  She will continue the current dose of levothyroxine 150 mcg 1 tablet 6 days a week.    Orders:    Comprehensive metabolic panel; Future    T4, free; Future    TSH, 3rd generation; Future    Hemoglobin A1C; Future    CBC and differential; Future    Lipid Panel with Direct LDL reflex; Future      She has been asked to follow-up in 3 months which is about 2 months postdelivery with preceding hemoglobin A1c, CMP, CBC, lipid panel, TSH, and free T4.

## 2025-07-02 ENCOUNTER — ULTRASOUND (OUTPATIENT)
Dept: PERINATAL CARE | Facility: OTHER | Age: 35
End: 2025-07-02
Attending: OBSTETRICS & GYNECOLOGY
Payer: COMMERCIAL

## 2025-07-02 VITALS
BODY MASS INDEX: 36.22 KG/M2 | HEIGHT: 62 IN | SYSTOLIC BLOOD PRESSURE: 104 MMHG | HEART RATE: 71 BPM | DIASTOLIC BLOOD PRESSURE: 68 MMHG | WEIGHT: 196.8 LBS

## 2025-07-02 DIAGNOSIS — O24.013 PREGNANCY COMPLICATED BY PRE-EXISTING TYPE 1 DIABETES IN THIRD TRIMESTER: Primary | ICD-10-CM

## 2025-07-02 DIAGNOSIS — Z3A.36 36 WEEKS GESTATION OF PREGNANCY: ICD-10-CM

## 2025-07-02 DIAGNOSIS — E03.9 ACQUIRED HYPOTHYROIDISM: ICD-10-CM

## 2025-07-02 PROCEDURE — 76816 OB US FOLLOW-UP PER FETUS: CPT | Performed by: OBSTETRICS & GYNECOLOGY

## 2025-07-02 PROCEDURE — 99213 OFFICE O/P EST LOW 20 MIN: CPT | Performed by: OBSTETRICS & GYNECOLOGY

## 2025-07-02 PROCEDURE — 59025 FETAL NON-STRESS TEST: CPT | Performed by: OBSTETRICS & GYNECOLOGY

## 2025-07-02 NOTE — LETTER
2025     Lisseth Vale MD  [address] [id number]    Patient: Fang Farrell   YOB: 1990   Date of Visit: 2025       Dear Dr. Lisseth Vale MD:    Thank you for referring Fang Farrell to me for evaluation. Below are my notes for this consultation.    If you have questions, please do not hesitate to call me. I look forward to following your patient along with you.         Sincerely,        Lakshmi Bustos MD        CC: No Recipients    Lakshmi Bustos MD  2025  6:51 PM  Sign when Signing Visit  Fang Farrell  has no complaints today at 36w6d. She reports fetal movements and does not report any vaginal bleeding or signs of labor.  She is here today for an ultrasound for fetal growth and an NST.  NIPT was normal.     Problem list:  Type 1 diabetes under good control with a hemoglobin A1c of 4.7 on the T slim insulin pump and a Dexcom G7 sensor which she manages on her own. She saw endocrine yesterday and the note is pending.   Maternal hypothyroidism on levothyroxine 150 mcg 6 days a week.  Her most recent TSH was 1.42 on .  History of a prior low-transverse  and is planning on a repeat on  at 38 weeks due to her proliferative retinopathy.     Ultrasound findings:  The ultrasound today shows the estimated fetal weight is in the 77th percentile.  The extremity bones all measure between the 1st percentile to the 16th percentile.  All of the extremity bones have normal echogenicity and do not appear bowed or fractured suggesting this is secondary to family genetics. The placenta is anterior and there is no sign of an accreta seen.       NST is reactive.    Pregnancy ultrasound has limitations and is unable to detect all forms of fetal congenital abnormalities.      Follow up recommended:   She will continue twice weekly nonstress test and weekly fluid scans till delivery.     Pre visit time reviewing her records   10 minutes  Face  to face time 5 minutes  Post visit time on documentation of note, updating her problem list, adding orders and prescriptions 10 minutes.  Procedures that were completed today were charged separately.   The level of decision making was low level complexity.    Lakshmi Bustos MD

## 2025-07-02 NOTE — PROGRESS NOTES
Fang Farrell  has no complaints today at 36w6d. She reports fetal movements and does not report any vaginal bleeding or signs of labor.  She is here today for an ultrasound for fetal growth and an NST.  NIPT was normal.     Problem list:  Type 1 diabetes under good control with a hemoglobin A1c of 4.7 on the T slim insulin pump and a Dexcom G7 sensor which she manages on her own. She saw endocrine yesterday and the note is pending.   Maternal hypothyroidism on levothyroxine 150 mcg 6 days a week.  Her most recent TSH was 1.42 on .  History of a prior low-transverse  and is planning on a repeat on  at 38 weeks due to her proliferative retinopathy.     Ultrasound findings:  The ultrasound today shows the estimated fetal weight is in the 77th percentile.  The extremity bones all measure between the 1st percentile to the 16th percentile.  All of the extremity bones have normal echogenicity and do not appear bowed or fractured suggesting this is secondary to family genetics. The placenta is anterior and there is no sign of an accreta seen.       NST is reactive.    Pregnancy ultrasound has limitations and is unable to detect all forms of fetal congenital abnormalities.      Follow up recommended:   She will continue twice weekly nonstress test and weekly fluid scans till delivery.     Pre visit time reviewing her records   10 minutes  Face to face time 5 minutes  Post visit time on documentation of note, updating her problem list, adding orders and prescriptions 10 minutes.  Procedures that were completed today were charged separately.   The level of decision making was low level complexity.    Lakshmi Bustos MD

## 2025-07-03 ENCOUNTER — ROUTINE PRENATAL (OUTPATIENT)
Age: 35
End: 2025-07-03

## 2025-07-03 VITALS — DIASTOLIC BLOOD PRESSURE: 82 MMHG | SYSTOLIC BLOOD PRESSURE: 116 MMHG | BODY MASS INDEX: 35.89 KG/M2 | WEIGHT: 196.2 LBS

## 2025-07-03 DIAGNOSIS — Z34.83 PRENATAL CARE, SUBSEQUENT PREGNANCY, THIRD TRIMESTER: Primary | ICD-10-CM

## 2025-07-03 PROCEDURE — PNV: Performed by: OBSTETRICS & GYNECOLOGY

## 2025-07-03 RX ORDER — LEVOTHYROXINE SODIUM 150 UG/1
TABLET ORAL
Qty: 75 TABLET | Refills: 1 | Status: SHIPPED | OUTPATIENT
Start: 2025-07-03

## 2025-07-03 NOTE — PROGRESS NOTES
37w0d  CS scheduled 7/8/25 w/ Akanksha  Plans to Bottle feed only  GBS negative    Up to date Tdap  Pre-existing Type 1 diabetic w/insulin pump(new pump received 5/10 Endo is involved in care and would prefer to see them- sugars have been in range     Denies Leaking of Fluid, vaginal bleeding, contractions  +Fetal Movement

## 2025-07-04 NOTE — PROGRESS NOTES
Fang presents for routine PN visit.   Having a hard time since her MFM visit - is having a lot of anxiety with the long bones being short and spiraling about all the bad things this could mean.   Provided reassurance as best I could based on interpretations by M.   C/S is scheduled for Tuesday.   Baby remains active.   No bleeding, LOF, contractions.   Admits she has been having a harder time keeping her sugar under as good control as she would like.     Plan for C/S:   Keep on insulin pump during delivery.  She is going to program her settings for post-partum.  Aware of paperwork.   Plans to feed neosure.  Given hibiclens and instructions.   Discussed NPO after midnight.   If has low - will use tiny amount of clear juice (d/w anesthesia).

## 2025-07-07 ENCOUNTER — ULTRASOUND (OUTPATIENT)
Dept: PERINATAL CARE | Facility: OTHER | Age: 35
End: 2025-07-07
Attending: OBSTETRICS & GYNECOLOGY
Payer: COMMERCIAL

## 2025-07-07 ENCOUNTER — ANESTHESIA EVENT (INPATIENT)
Dept: LABOR AND DELIVERY | Facility: HOSPITAL | Age: 35
End: 2025-07-07
Payer: COMMERCIAL

## 2025-07-07 VITALS
DIASTOLIC BLOOD PRESSURE: 68 MMHG | WEIGHT: 197.6 LBS | HEART RATE: 102 BPM | HEIGHT: 62 IN | SYSTOLIC BLOOD PRESSURE: 114 MMHG | BODY MASS INDEX: 36.36 KG/M2

## 2025-07-07 DIAGNOSIS — O24.013 PREGNANCY COMPLICATED BY PRE-EXISTING TYPE 1 DIABETES IN THIRD TRIMESTER: Primary | ICD-10-CM

## 2025-07-07 DIAGNOSIS — Z3A.37 37 WEEKS GESTATION OF PREGNANCY: ICD-10-CM

## 2025-07-07 PROCEDURE — 76818 FETAL BIOPHYS PROFILE W/NST: CPT | Performed by: OBSTETRICS & GYNECOLOGY

## 2025-07-07 PROCEDURE — 59025 FETAL NON-STRESS TEST: CPT | Performed by: OBSTETRICS & GYNECOLOGY

## 2025-07-07 PROCEDURE — NC001 PR NO CHARGE: Performed by: OBSTETRICS & GYNECOLOGY

## 2025-07-07 NOTE — ANESTHESIA PREPROCEDURE EVALUATION
Procedure:   SECTION () (Uterus)    Relevant Problems   ANESTHESIA (within normal limits)      CARDIO (within normal limits)      ENDO   (+) Hypothyroid   (+) Pregnancy complicated by pre-existing type 1 diabetes in third trimester      GI/HEPATIC (within normal limits)      /RENAL (within normal limits)      HEMATOLOGY (within normal limits)      MUSCULOSKELETAL (within normal limits)      NEURO/PSYCH (within normal limits)      PULMONARY (within normal limits)      Obstetrics/Gynecology   (+) Status post primary low transverse  section      Eye   (+) Mild nonproliferative diabetic retinopathy of left eye with macular edema associated with type 1 diabetes mellitus (HCC)   (+) Type 1 diabetes 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 Cardiovascular exam normal    Dental   No notable dental hx     Pulmonary  Pulmonary exam normal Breath sounds clear to auscultation    Neurological  - normal exam  She appears awake, alert and oriented x3.      Other Findings  post-pubertal.      Anesthesia Plan  ASA Score- 2     Anesthesia Type- spinal with ASA Monitors.         Additional Monitors:     Airway Plan:            Plan Factors-Exercise tolerance (METS): >4 METS.    Chart reviewed. EKG reviewed.  Existing labs reviewed. Patient summary reviewed.                  Induction-     Postoperative Plan- Plan for postoperative opioid use.   Monitoring Plan - Monitoring plan - standard ASA monitoring  Post Operative Pain Plan - non-opiod analgesics, plan for postoperative opioid use and multimodal analgesia    Perioperative Resuscitation Plan - Level 1 - Full Code.       Informed Consent- Anesthetic plan and risks discussed with patient.  I personally reviewed this patient with the CRNA.  "Discussed and agreed on the Anesthesia Plan with the CRNA..      NPO Status:  No vitals data found for the desired time range.    Recent labs personally reviewed:  Lab Results   Component Value Date    WBC 11.5 (H) 04/18/2025    HGB 11.0 (L) 04/18/2025     04/18/2025     Lab Results   Component Value Date    K 4.0 04/18/2025    BUN 7 04/18/2025    CREATININE 0.55 (L) 04/18/2025     No results found for: \"PTT\"   No results found for: \"INR\"    Blood type A    Lab Results   Component Value Date    HGBA1C 4.7 (L) 04/18/2025       I, Chester Morales MD, have personally seen and evaluated the patient prior to anesthetic care.  I have reviewed the pre-anesthetic record, and other medical records if appropriate to the anesthetic care.  If a CRNA is involved in the case, I have reviewed the CRNA assessment, if present, and agree. Risks/benefits and alternatives discussed with patient including possible PONV, sore throat, and possibility of rare anesthetic and surgical emergencies.        " [FreeTextEntry1] : ASH FINE is a 39 year F G0  here for annual visit. sexually active,  withdrawals. Does not desire to have children. She suffers from bilateral thoracic outlet syndrome - has seen multiple specialists for it. \par Denies vaginal bleeding, discharge or pain.\par \par BC: withdrawal\par Gynhx: Reg menses, No h/o STIs/fibroids/abn paps; remote h/o ovarian cysts\par Obhx:nullip\par \par Last mammo: 2021 - nl per pt\par Last Colonoscopy:never\par Last Pap smear: 2019 per pt\par Last dexa:n/a\par

## 2025-07-08 ENCOUNTER — ANESTHESIA (INPATIENT)
Dept: LABOR AND DELIVERY | Facility: HOSPITAL | Age: 35
End: 2025-07-08
Payer: COMMERCIAL

## 2025-07-08 ENCOUNTER — HOSPITAL ENCOUNTER (INPATIENT)
Facility: HOSPITAL | Age: 35
LOS: 2 days | Discharge: HOME | End: 2025-07-10
Attending: OBSTETRICS & GYNECOLOGY | Admitting: OBSTETRICS & GYNECOLOGY
Payer: COMMERCIAL

## 2025-07-08 DIAGNOSIS — E10.40 TYPE 1 DIABETES MELLITUS WITH DIABETIC NEUROPATHY (HCC): ICD-10-CM

## 2025-07-08 DIAGNOSIS — Z98.891 STATUS POST PRIMARY LOW TRANSVERSE CESAREAN SECTION: ICD-10-CM

## 2025-07-08 DIAGNOSIS — E10.3493: Primary | ICD-10-CM

## 2025-07-08 PROBLEM — Z3A.37 37 WEEKS GESTATION OF PREGNANCY: Status: ACTIVE | Noted: 2025-07-08

## 2025-07-08 LAB
ABO GROUP BLD: NORMAL
ALBUMIN SERPL BCG-MCNC: 3.3 G/DL (ref 3.5–5)
ALP SERPL-CCNC: 98 U/L (ref 34–104)
ALT SERPL W P-5'-P-CCNC: 10 U/L (ref 7–52)
ANION GAP SERPL CALCULATED.3IONS-SCNC: 8 MMOL/L (ref 4–13)
AST SERPL W P-5'-P-CCNC: 20 U/L (ref 13–39)
BASE EXCESS BLDCOA CALC-SCNC: -2.4 MMOL/L (ref 3–11)
BASE EXCESS BLDCOV CALC-SCNC: -2.7 MMOL/L (ref 1–9)
BASOPHILS # BLD AUTO: 0.03 THOUSANDS/ÂΜL (ref 0–0.1)
BASOPHILS NFR BLD AUTO: 0 % (ref 0–1)
BILIRUB SERPL-MCNC: 0.61 MG/DL (ref 0.2–1)
BLD GP AB SCN SERPL QL: NEGATIVE
BUN SERPL-MCNC: 10 MG/DL (ref 5–25)
CALCIUM ALBUM COR SERPL-MCNC: 9.6 MG/DL (ref 8.3–10.1)
CALCIUM SERPL-MCNC: 9 MG/DL (ref 8.4–10.2)
CHLORIDE SERPL-SCNC: 105 MMOL/L (ref 96–108)
CO2 SERPL-SCNC: 23 MMOL/L (ref 21–32)
CREAT SERPL-MCNC: 0.55 MG/DL (ref 0.6–1.3)
EOSINOPHIL # BLD AUTO: 0.42 THOUSAND/ÂΜL (ref 0–0.61)
EOSINOPHIL NFR BLD AUTO: 4 % (ref 0–6)
ERYTHROCYTE [DISTWIDTH] IN BLOOD BY AUTOMATED COUNT: 13.2 % (ref 11.6–15.1)
GFR SERPL CREATININE-BSD FRML MDRD: 121 ML/MIN/1.73SQ M
GLUCOSE SERPL-MCNC: 149 MG/DL (ref 65–140)
GLUCOSE SERPL-MCNC: 162 MG/DL (ref 65–140)
GLUCOSE SERPL-MCNC: 72 MG/DL (ref 65–140)
GLUCOSE SERPL-MCNC: 76 MG/DL (ref 65–140)
HCO3 BLDCOA-SCNC: 24.7 MMOL/L (ref 17.3–27.3)
HCO3 BLDCOV-SCNC: 23.2 MMOL/L (ref 12.2–28.6)
HCT VFR BLD AUTO: 37.5 % (ref 34.8–46.1)
HGB BLD-MCNC: 12.8 G/DL (ref 11.5–15.4)
IMM GRANULOCYTES # BLD AUTO: 0.11 THOUSAND/UL (ref 0–0.2)
IMM GRANULOCYTES NFR BLD AUTO: 1 % (ref 0–2)
LYMPHOCYTES # BLD AUTO: 3.09 THOUSANDS/ÂΜL (ref 0.6–4.47)
LYMPHOCYTES NFR BLD AUTO: 28 % (ref 14–44)
MCH RBC QN AUTO: 30.3 PG (ref 26.8–34.3)
MCHC RBC AUTO-ENTMCNC: 34.1 G/DL (ref 31.4–37.4)
MCV RBC AUTO: 89 FL (ref 82–98)
MONOCYTES # BLD AUTO: 1.04 THOUSAND/ÂΜL (ref 0.17–1.22)
MONOCYTES NFR BLD AUTO: 9 % (ref 4–12)
NEUTROPHILS # BLD AUTO: 6.52 THOUSANDS/ÂΜL (ref 1.85–7.62)
NEUTS SEG NFR BLD AUTO: 58 % (ref 43–75)
NRBC BLD AUTO-RTO: 0 /100 WBCS
O2 CT VFR BLDCOA CALC: 7.4 ML/DL
OXYHGB MFR BLDCOA: 36.7 %
OXYHGB MFR BLDCOV: 76 %
PCO2 BLDCOA: 51.3 MM[HG] (ref 30–60)
PCO2 BLDCOV: 44.2 MM HG (ref 27–43)
PH BLDCOA: 7.3 [PH] (ref 7.23–7.43)
PH BLDCOV: 7.34 [PH] (ref 7.19–7.49)
PLATELET # BLD AUTO: 178 THOUSANDS/UL (ref 149–390)
PMV BLD AUTO: 10.9 FL (ref 8.9–12.7)
PO2 BLDCOA: 18.3 MM HG (ref 5–25)
PO2 BLDCOV: 34 MM HG (ref 15–45)
POTASSIUM SERPL-SCNC: 3.6 MMOL/L (ref 3.5–5.3)
PROT SERPL-MCNC: 7.1 G/DL (ref 6.4–8.4)
RBC # BLD AUTO: 4.22 MILLION/UL (ref 3.81–5.12)
RH BLD: POSITIVE
SAO2 % BLDCOV: 14.8 ML/DL
SODIUM SERPL-SCNC: 136 MMOL/L (ref 135–147)
SPECIMEN EXPIRATION DATE: NORMAL
TREPONEMA PALLIDUM IGG+IGM AB [PRESENCE] IN SERUM OR PLASMA BY IMMUNOASSAY: NORMAL
WBC # BLD AUTO: 11.21 THOUSAND/UL (ref 4.31–10.16)

## 2025-07-08 PROCEDURE — 86850 RBC ANTIBODY SCREEN: CPT | Performed by: OBSTETRICS & GYNECOLOGY

## 2025-07-08 PROCEDURE — NC001 PR NO CHARGE: Performed by: OBSTETRICS & GYNECOLOGY

## 2025-07-08 PROCEDURE — 59510 CESAREAN DELIVERY: CPT | Performed by: OBSTETRICS & GYNECOLOGY

## 2025-07-08 PROCEDURE — 85025 COMPLETE CBC W/AUTO DIFF WBC: CPT | Performed by: OBSTETRICS & GYNECOLOGY

## 2025-07-08 PROCEDURE — 80053 COMPREHEN METABOLIC PANEL: CPT | Performed by: OBSTETRICS & GYNECOLOGY

## 2025-07-08 PROCEDURE — 86780 TREPONEMA PALLIDUM: CPT | Performed by: OBSTETRICS & GYNECOLOGY

## 2025-07-08 PROCEDURE — 59514 CESAREAN DELIVERY ONLY: CPT | Performed by: OBSTETRICS & GYNECOLOGY

## 2025-07-08 PROCEDURE — 86901 BLOOD TYPING SEROLOGIC RH(D): CPT | Performed by: OBSTETRICS & GYNECOLOGY

## 2025-07-08 PROCEDURE — 4A1HXCZ MONITORING OF PRODUCTS OF CONCEPTION, CARDIAC RATE, EXTERNAL APPROACH: ICD-10-PCS | Performed by: OBSTETRICS & GYNECOLOGY

## 2025-07-08 PROCEDURE — 94762 N-INVAS EAR/PLS OXIMTRY CONT: CPT

## 2025-07-08 PROCEDURE — 82948 REAGENT STRIP/BLOOD GLUCOSE: CPT

## 2025-07-08 PROCEDURE — 82805 BLOOD GASES W/O2 SATURATION: CPT | Performed by: OBSTETRICS & GYNECOLOGY

## 2025-07-08 PROCEDURE — 86900 BLOOD TYPING SEROLOGIC ABO: CPT | Performed by: OBSTETRICS & GYNECOLOGY

## 2025-07-08 PROCEDURE — 88307 TISSUE EXAM BY PATHOLOGIST: CPT | Performed by: PATHOLOGY

## 2025-07-08 RX ORDER — ACETAMINOPHEN 325 MG/1
650 TABLET ORAL EVERY 6 HOURS SCHEDULED
Status: DISCONTINUED | OUTPATIENT
Start: 2025-07-08 | End: 2025-07-10 | Stop reason: HOSPADM

## 2025-07-08 RX ORDER — OXYTOCIN/0.9 % SODIUM CHLORIDE 30/500 ML
62.5 PLASTIC BAG, INJECTION (ML) INTRAVENOUS ONCE
Status: COMPLETED | OUTPATIENT
Start: 2025-07-08 | End: 2025-07-08

## 2025-07-08 RX ORDER — HYDROMORPHONE HCL/PF 1 MG/ML
0.5 SYRINGE (ML) INJECTION EVERY 2 HOUR PRN
Status: DISCONTINUED | OUTPATIENT
Start: 2025-07-08 | End: 2025-07-10 | Stop reason: HOSPADM

## 2025-07-08 RX ORDER — KETOROLAC TROMETHAMINE 30 MG/ML
15 INJECTION, SOLUTION INTRAMUSCULAR; INTRAVENOUS EVERY 6 HOURS SCHEDULED
Status: COMPLETED | OUTPATIENT
Start: 2025-07-08 | End: 2025-07-09

## 2025-07-08 RX ORDER — CEFAZOLIN SODIUM 2 G/50ML
2000 SOLUTION INTRAVENOUS ONCE
Status: COMPLETED | OUTPATIENT
Start: 2025-07-08 | End: 2025-07-08

## 2025-07-08 RX ORDER — ONDANSETRON 2 MG/ML
4 INJECTION INTRAMUSCULAR; INTRAVENOUS EVERY 8 HOURS PRN
Status: DISCONTINUED | OUTPATIENT
Start: 2025-07-08 | End: 2025-07-10 | Stop reason: HOSPADM

## 2025-07-08 RX ORDER — SIMETHICONE 80 MG
80 TABLET,CHEWABLE ORAL 4 TIMES DAILY PRN
Status: DISCONTINUED | OUTPATIENT
Start: 2025-07-08 | End: 2025-07-10 | Stop reason: HOSPADM

## 2025-07-08 RX ORDER — BUPIVACAINE HYDROCHLORIDE 7.5 MG/ML
INJECTION, SOLUTION INTRASPINAL AS NEEDED
Status: DISCONTINUED | OUTPATIENT
Start: 2025-07-08 | End: 2025-07-08

## 2025-07-08 RX ORDER — OXYCODONE HYDROCHLORIDE 5 MG/1
5 TABLET ORAL EVERY 4 HOURS PRN
Status: DISCONTINUED | OUTPATIENT
Start: 2025-07-09 | End: 2025-07-09 | Stop reason: SDUPTHER

## 2025-07-08 RX ORDER — KETOROLAC TROMETHAMINE 30 MG/ML
INJECTION, SOLUTION INTRAMUSCULAR; INTRAVENOUS AS NEEDED
Status: DISCONTINUED | OUTPATIENT
Start: 2025-07-08 | End: 2025-07-08

## 2025-07-08 RX ORDER — SODIUM CHLORIDE 9 MG/ML
125 INJECTION, SOLUTION INTRAVENOUS CONTINUOUS
Status: DISCONTINUED | OUTPATIENT
Start: 2025-07-08 | End: 2025-07-10 | Stop reason: HOSPADM

## 2025-07-08 RX ORDER — ENOXAPARIN SODIUM 100 MG/ML
40 INJECTION SUBCUTANEOUS
Status: DISCONTINUED | OUTPATIENT
Start: 2025-07-09 | End: 2025-07-10 | Stop reason: HOSPADM

## 2025-07-08 RX ORDER — ONDANSETRON 2 MG/ML
INJECTION INTRAMUSCULAR; INTRAVENOUS AS NEEDED
Status: DISCONTINUED | OUTPATIENT
Start: 2025-07-08 | End: 2025-07-08

## 2025-07-08 RX ORDER — OXYTOCIN/0.9 % SODIUM CHLORIDE 30/500 ML
PLASTIC BAG, INJECTION (ML) INTRAVENOUS CONTINUOUS PRN
Status: DISCONTINUED | OUTPATIENT
Start: 2025-07-08 | End: 2025-07-08

## 2025-07-08 RX ORDER — OXYCODONE HYDROCHLORIDE 5 MG/1
5 TABLET ORAL EVERY 4 HOURS PRN
Refills: 0 | Status: DISCONTINUED | OUTPATIENT
Start: 2025-07-08 | End: 2025-07-10 | Stop reason: HOSPADM

## 2025-07-08 RX ORDER — OXYCODONE HYDROCHLORIDE 5 MG/1
10 TABLET ORAL EVERY 4 HOURS PRN
Refills: 0 | Status: DISCONTINUED | OUTPATIENT
Start: 2025-07-08 | End: 2025-07-10 | Stop reason: HOSPADM

## 2025-07-08 RX ORDER — DIPHENHYDRAMINE HYDROCHLORIDE 50 MG/ML
25 INJECTION, SOLUTION INTRAMUSCULAR; INTRAVENOUS EVERY 6 HOURS PRN
Status: DISCONTINUED | OUTPATIENT
Start: 2025-07-08 | End: 2025-07-10 | Stop reason: HOSPADM

## 2025-07-08 RX ORDER — LEVOTHYROXINE SODIUM 75 UG/1
150 TABLET ORAL
Status: DISCONTINUED | OUTPATIENT
Start: 2025-07-09 | End: 2025-07-10 | Stop reason: HOSPADM

## 2025-07-08 RX ORDER — EPHEDRINE SULFATE 50 MG/ML
INJECTION INTRAVENOUS AS NEEDED
Status: DISCONTINUED | OUTPATIENT
Start: 2025-07-08 | End: 2025-07-08

## 2025-07-08 RX ORDER — FENTANYL CITRATE 50 UG/ML
INJECTION, SOLUTION INTRAMUSCULAR; INTRAVENOUS AS NEEDED
Status: DISCONTINUED | OUTPATIENT
Start: 2025-07-08 | End: 2025-07-08

## 2025-07-08 RX ORDER — NALOXONE HYDROCHLORIDE 0.4 MG/ML
0.1 INJECTION, SOLUTION INTRAMUSCULAR; INTRAVENOUS; SUBCUTANEOUS
Status: ACTIVE | OUTPATIENT
Start: 2025-07-08 | End: 2025-07-09

## 2025-07-08 RX ORDER — MORPHINE SULFATE 0.5 MG/ML
INJECTION, SOLUTION EPIDURAL; INTRATHECAL; INTRAVENOUS AS NEEDED
Status: DISCONTINUED | OUTPATIENT
Start: 2025-07-08 | End: 2025-07-08

## 2025-07-08 RX ORDER — SODIUM CHLORIDE, SODIUM LACTATE, POTASSIUM CHLORIDE, CALCIUM CHLORIDE 600; 310; 30; 20 MG/100ML; MG/100ML; MG/100ML; MG/100ML
INJECTION, SOLUTION INTRAVENOUS CONTINUOUS PRN
Status: DISCONTINUED | OUTPATIENT
Start: 2025-07-08 | End: 2025-07-08

## 2025-07-08 RX ORDER — DOCUSATE SODIUM 100 MG/1
100 CAPSULE, LIQUID FILLED ORAL 2 TIMES DAILY
Status: DISCONTINUED | OUTPATIENT
Start: 2025-07-08 | End: 2025-07-10 | Stop reason: HOSPADM

## 2025-07-08 RX ADMIN — FENTANYL CITRATE 15 MCG: 50 INJECTION, SOLUTION INTRAMUSCULAR; INTRAVENOUS at 08:39

## 2025-07-08 RX ADMIN — Medication 62.5 MILLI-UNITS/MIN: at 11:10

## 2025-07-08 RX ADMIN — SODIUM CHLORIDE, SODIUM LACTATE, POTASSIUM CHLORIDE, AND CALCIUM CHLORIDE: .6; .31; .03; .02 INJECTION, SOLUTION INTRAVENOUS at 09:11

## 2025-07-08 RX ADMIN — SODIUM CHLORIDE 125 ML/HR: 0.9 INJECTION, SOLUTION INTRAVENOUS at 11:59

## 2025-07-08 RX ADMIN — DOCUSATE SODIUM 100 MG: 100 CAPSULE, LIQUID FILLED ORAL at 17:13

## 2025-07-08 RX ADMIN — SODIUM CHLORIDE 1000 ML: 0.9 INJECTION, SOLUTION INTRAVENOUS at 07:00

## 2025-07-08 RX ADMIN — ACETAMINOPHEN 650 MG: 325 TABLET ORAL at 11:57

## 2025-07-08 RX ADMIN — MORPHINE SULFATE 0.1 MG: 0.5 INJECTION EPIDURAL; INTRATHECAL; INTRAVENOUS at 08:39

## 2025-07-08 RX ADMIN — KETOROLAC TROMETHAMINE 15 MG: 30 INJECTION, SOLUTION INTRAMUSCULAR; INTRAVENOUS at 23:46

## 2025-07-08 RX ADMIN — KETOROLAC TROMETHAMINE 15 MG: 30 INJECTION, SOLUTION INTRAMUSCULAR; INTRAVENOUS at 15:29

## 2025-07-08 RX ADMIN — SODIUM CHLORIDE 125 ML/HR: 0.9 INJECTION, SOLUTION INTRAVENOUS at 07:46

## 2025-07-08 RX ADMIN — PHENYLEPHRINE HYDROCHLORIDE 30 MCG/MIN: 10 INJECTION, SOLUTION INTRAVENOUS at 08:34

## 2025-07-08 RX ADMIN — EPHEDRINE SULFATE 10 MG: 50 INJECTION INTRAVENOUS at 09:10

## 2025-07-08 RX ADMIN — ONDANSETRON 4 MG: 2 INJECTION, SOLUTION INTRAMUSCULAR; INTRAVENOUS at 08:41

## 2025-07-08 RX ADMIN — BUPIVACAINE HYDROCHLORIDE IN DEXTROSE 1.6 ML: 7.5 INJECTION, SOLUTION SUBARACHNOID at 08:39

## 2025-07-08 RX ADMIN — Medication 250 MILLI-UNITS/MIN: at 09:00

## 2025-07-08 RX ADMIN — CEFAZOLIN SODIUM 2000 MG: 2 SOLUTION INTRAVENOUS at 08:36

## 2025-07-08 RX ADMIN — ACETAMINOPHEN 650 MG: 325 TABLET ORAL at 17:13

## 2025-07-08 RX ADMIN — KETOROLAC TROMETHAMINE 30 MG: 30 INJECTION, SOLUTION INTRAMUSCULAR; INTRAVENOUS at 09:21

## 2025-07-08 NOTE — ANESTHESIA POSTPROCEDURE EVALUATION
Post-Op Assessment Note    CV Status:  Stable  Pain Score: 0    Pain management: adequate       Mental Status:  Alert and awake   Hydration Status:  Stable   PONV Controlled:  None   Airway Patency:  Patent     Post Op Vitals Reviewed: Yes    No anethesia notable event occurred.    Staff: Anesthesiologist, CRNA   Comments: vss          Last Filed PACU Vitals:  Vitals Value Taken Time   Temp     Pulse 82    /76    Resp 16    SpO2 98

## 2025-07-08 NOTE — PLAN OF CARE
Problem: PAIN - ADULT  Goal: Verbalizes/displays adequate comfort level or baseline comfort level  Description: Interventions:  - Encourage patient to monitor pain and request assistance  - Assess pain using appropriate pain scale  - Administer analgesics as ordered based on type and severity of pain and evaluate response  - Implement non-pharmacological measures as appropriate and evaluate response  - Consider cultural and social influences on pain and pain management  - Notify physician/advanced practitioner if interventions unsuccessful or patient reports new pain  - Educate patient/family on pain management process including their role and importance of  reporting pain   - Provide non-pharmacologic/complimentary pain relief interventions  Outcome: Progressing     Problem: INFECTION - ADULT  Goal: Absence or prevention of progression during hospitalization  Description: INTERVENTIONS:  - Assess and monitor for signs and symptoms of infection  - Monitor lab/diagnostic results  - Monitor all insertion sites, i.e. indwelling lines, tubes, and drains  - Monitor endotracheal if appropriate and nasal secretions for changes in amount and color  - Marydel appropriate cooling/warming therapies per order  - Administer medications as ordered  - Instruct and encourage patient and family to use good hand hygiene technique  - Identify and instruct in appropriate isolation precautions for identified infection/condition  Outcome: Progressing  Goal: Absence of fever/infection during neutropenic period  Description: INTERVENTIONS:  - Monitor WBC  - Perform strict hand hygiene  - Limit to healthy visitors only  - No plants, dried, fresh or silk flowers with bagley in patient room  Outcome: Progressing     Problem: SAFETY ADULT  Goal: Patient will remain free of falls  Description: INTERVENTIONS:  - Educate patient/family on patient safety including physical limitations  - Instruct patient to call for assistance with activity   -  Consider consulting OT/PT to assist with strengthening/mobility based on AM PAC & JH-HLM score  - Consult OT/PT to assist with strengthening/mobility   - Keep Call bell within reach  - Keep bed low and locked with side rails adjusted as appropriate  - Keep care items and personal belongings within reach  - Initiate and maintain comfort rounds  - Make Fall Risk Sign visible to staff  - Apply yellow socks and bracelet for high fall risk patients  - Consider moving patient to room near nurses station  Outcome: Progressing  Goal: Maintain or return to baseline ADL function  Description: INTERVENTIONS:  -  Assess patient's ability to carry out ADLs; assess patient's baseline for ADL function and identify physical deficits which impact ability to perform ADLs (bathing, care of mouth/teeth, toileting, grooming, dressing, etc.)  - Assess/evaluate cause of self-care deficits   - Assess range of motion  - Assess patient's mobility; develop plan if impaired  - Assess patient's need for assistive devices and provide as appropriate  - Encourage maximum independence but intervene and supervise when necessary  - Involve family in performance of ADLs  - Assess for home care needs following discharge   - Consider OT consult to assist with ADL evaluation and planning for discharge  - Provide patient education as appropriate  - Monitor functional capacity and physical performance, use of AM PAC & JH-HLM   - Monitor gait, balance and fatigue with ambulation    Outcome: Progressing  Goal: Maintains/Returns to pre admission functional level  Description: INTERVENTIONS:  - Perform AM-PAC 6 Click Basic Mobility/ Daily Activity assessment daily.  - Set and communicate daily mobility goal to care team and patient/family/caregiver.   - Collaborate with rehabilitation services on mobility goals if consulted  - Out of bed for toileting  - Record patient progress and toleration of activity level   Outcome: Progressing     Problem: DISCHARGE  PLANNING  Goal: Discharge to home or other facility with appropriate resources  Description: INTERVENTIONS:  - Identify barriers to discharge w/patient and caregiver  - Arrange for needed discharge resources and transportation as appropriate  - Identify discharge learning needs (meds, wound care, etc.)  - Arrange for interpretive services to assist at discharge as needed  - Refer to Case Management Department for coordinating discharge planning if the patient needs post-hospital services based on physician/advanced practitioner order or complex needs related to functional status, cognitive ability, or social support system  Outcome: Progressing     Problem: Knowledge Deficit  Goal: Patient/family/caregiver demonstrates understanding of disease process, treatment plan, medications, and discharge instructions  Description: Complete learning assessment and assess knowledge base.  Interventions:  - Provide teaching at level of understanding  - Provide teaching via preferred learning methods  Outcome: Progressing     Problem: BIRTH - VAGINAL/ SECTION  Goal: Fetal and maternal status remain reassuring during the birth process  Description: INTERVENTIONS:  - Monitor vital signs  - Monitor fetal heart rate  - Monitor uterine activity  - Monitor labor progression (vaginal delivery)  - DVT prophylaxis  - Antibiotic prophylaxis  Outcome: Progressing  Goal: Emotionally satisfying birthing experience for mother/fetus  Description: Interventions:  - Assess, plan, implement and evaluate the nursing care given to the patient in labor  - Advocate the philosophy that each childbirth experience is a unique experience and support the family's chosen level of involvement and control during the labor process   - Actively participate in both the patient's and family's teaching of the birth process  - Consider cultural, Confucianism and age-specific factors and plan care for the patient in labor  Outcome: Progressing

## 2025-07-08 NOTE — NURSING NOTE
Blood sugar 162 post prandial. Patient reported she covered her meal via insulin pump.    Lucy VOGT at bedside        Demetrio Bhatt RN  08/12/22 1935

## 2025-07-08 NOTE — H&P
H&P - OB/GYN   Name: Fang Farrell 34 y.o. 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    Has postpartum settings.     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     History of Present Illness   Patient of: [facility] [place]  Brief Summary: Fang Farrell  with an SHOAIB of 2025, by Other Basis at 37w5d presenting for scheduled repeat .   She is a type 1 diabetic with retinopathy.    Chief Complaint:  Elective     HPI:  Contractions: None.   Leakage of fluid: None.   Bleeding: None.   Fetal movement: present.    Pregnancy complications: Type 1 DM    Review of Systems    Historical Information   Historical Information   Past Medical History[1]  Past Surgical History[2]  Social History[3]  E-Cigarette/Vaping    E-Cigarette Use Never User      E-Cigarette/Vaping Substances    Nicotine No     THC No     CBD No     Flavoring No     Other No     Unknown No      Family history non-contributory  OB History    Para Term  AB Living   2 1 1 0 0 1   SAB IAB Ectopic Multiple Live Births   0 0 0 0 1      # Outcome Date GA Lbr Gordon/2nd Weight Sex Type Anes PTL Lv   2 Current            1 Term 23 37w2d  3380 g  (7 lb 7.2 oz) M CS-LTranv EPI N LANRE     Baby complications/comments: EFW 3500g, AC >99%    Objective :  Temp:  [98.3 °F (36.8 °C)] 98.3 °F (36.8 °C)  HR:  [] 96  BP: (111-114)/(68-70) 111/70  Resp:  [16] 16    Physical Exam  Vitals reviewed.   Constitutional:       Appearance: Normal appearance. She is obese.   HENT:      Head: Normocephalic and atraumatic.     Cardiovascular:      Rate and Rhythm: Normal rate.   Pulmonary:      Effort: No respiratory distress.   Abdominal:      Palpations: Abdomen is soft.      Tenderness: There is no abdominal tenderness.     Musculoskeletal:      Right lower leg: Edema present.      Left lower leg: Edema present.     Skin:     General: Skin is warm and dry.     Neurological:      Mental Status: She is alert and oriented to person, place, and time.     Psychiatric:         Mood and Affect: Mood normal.         Behavior: Behavior normal.             Contractions: None     Fetal heart rate: 130 cat 1       Lab Results: I have reviewed the following results:  Strep Grp B PCR   Date Value Ref Range Status   2025 Negative Negative Final     Comment:           Strep Grp B HARSH   Date Value Ref Range Status   2023 Negative Negative Final     Comment:     Centers for Disease Control and Prevention (CDC) and American Congress  of Obstetricians and Gynecologists (ACOG) guidelines for prevention of   group B streptococcal (GBS) disease specify co-collection of  a vaginal and rectal swab specimen to maximize sensitivity of GBS  detection. Per the CDC and ACOG, swabbing both the lower vagina and  rectum substantially increases the yield of detection compared with  sampling the vagina alone.  Penicillin G, ampicillin, or cefazolin are indicated for intrapartum  prophylaxis of  GBS colonization. Reflex susceptibility  testing should be performed prior to use of clindamycin only on GBS  isolates from penicillin-allergic women who are considered a high risk  for  "anaphylaxis. Treatment with vancomycin without additional testing  is warranted if resistance to clindamycin is noted.       No results found for: \"STREPGPB\"  RPR   Date Value Ref Range Status   04/18/2025 Non Reactive Non Reactive Final     No results found for: \"HEPBSAG\"  No components found for: \"EXTHEPBSAG\"  HEP C AB   Date Value Ref Range Status   02/02/2023 <0.1 0.0 - 0.9 s/co ratio Final     Comment:                                       Negative:     < 0.8                               Indeterminate: 0.8 - 0.9                                    Positive:     > 0.9   HCV antibody alone does not differentiate between   previous resolved infection and active infection.   The CDC and current clinical guidelines recommend   that a positive HCV antibody result be followed up   with an HCV RNA test to support the diagnosis of   acute HCV infection. [facility] offers Hepatitis C   Virus (HCV) RNA, Diagnosis, HARSH (939198) and   Hepatitis C Virus (HCV) Antibody with reflex to   Quantitative Real-time PCR ([id number]).       No results found for: \"RUBELLAIGGQT\"  No results found for: \"EXTRUBELIGGQ\"  No results found for: \"HIVAGAB\"  No components found for: \"VLOOMB4YG\"  N gonorrhoeae, DNA Probe   Date Value Ref Range Status   01/08/2025 Negative Negative Final     Chlamydia trachomatis, DNA Probe   Date Value Ref Range Status   01/08/2025 Negative Negative Final       Type & Screen:  ABO Grouping   Date Value Ref Range Status   08/02/2023 A  Final     Rh Factor   Date Value Ref Range Status   08/02/2023 Positive  Final     Rh Type   Date Value Ref Range Status   02/02/2023 Positive  Final     Comment:     Please note: Prior records for this patient's ABO / Rh type are not  available for additional verification.       No results found for: \"ANTIBODYSCR\"  Specimen Expiration Date   Date Value Ref Range Status   08/02/2023 [id number]  Final              [1]   Past Medical History:  Diagnosis Date    Anxiety     Diabetes mellitus " (HCC)     type 1; since age 1; on dexcom (since summer 2022) and Tandem T-slim (<2mos) with excellent trend down in A1 (5.5!); had rough go in college years (did not take care of her diabetes); follows with LAKESHA Dhaliwal.    Disease of thyroid gland     Elective procedure for unacceptable cosmetic appearance     smart lipo    Herpes     HSV1    Hypothyroidism 2012    Obesity 2010    just overweight more so    Pneumonia     Shingles     Urinary tract infection     Varicella     had chicken pox   [2]   Past Surgical History:  Procedure Laterality Date    COSMETIC SURGERY  Around     Smart lipo on my stomach    HAND SURGERY      LIPOSUCTION  2012    smart lipo    MO  DELIVERY ONLY N/A 2023    Procedure:  SECTION ();  Surgeon: Marisol aM MD;  Location: [facility] ;  Service: Obstetrics    WISDOM TOOTH EXTRACTION     [3]   Social History  Tobacco Use    Smoking status: Never    Smokeless tobacco: Never   Vaping Use    Vaping status: Never Used   Substance and Sexual Activity    Alcohol use: Not Currently     Comment: I have not had a drink in about 1 year    Drug use: Never    Sexual activity: Yes     Partners: Male     Birth control/protection: None     Comment: Prior used pill

## 2025-07-08 NOTE — ANESTHESIA PROCEDURE NOTES
Spinal Block    Patient location during procedure: OR  Start time: 7/8/2025 8:39 AM  Reason for block: procedure for pain and at surgeon's request  Staffing  Performed by: Chester Morales MD  Authorized by: Chester Morales MD    Preanesthetic Checklist  Completed: patient identified, IV checked, site marked, risks and benefits discussed, surgical consent, monitors and equipment checked, pre-op evaluation and timeout performed  Spinal Block  Patient position: sitting  Prep: ChloraPrep and site prepped and draped  Patient monitoring: frequent blood pressure checks, continuous pulse ox and heart rate  Approach: midline  Location: L3-4  Needle  Needle type: Pencan   Needle gauge: 24 G  Needle length: 4 in  Assessment  Sensory level: T4  Injection Assessment:  negative aspiration for heme, no paresthesia on injection and positive aspiration for clear CSF.  Post-procedure:  site cleaned

## 2025-07-08 NOTE — ASSESSMENT & PLAN NOTE
Lab Results   Component Value Date    HGBA1C 4.7 (L) 04/18/2025       Recent Labs     07/08/25  0639   POCGLU 76       Blood Sugar Average: Last 72 hrs:  (P) 76      Patient on insulin pump, will maintain during this time.    Has postpartum settings.

## 2025-07-08 NOTE — ASSESSMENT & PLAN NOTE
Lab Results   Component Value Date    HGBA1C 4.7 (L) 04/18/2025       Recent Labs     07/08/25  0639   POCGLU 76       Blood Sugar Average: Last 72 hrs:  (P) 76

## 2025-07-08 NOTE — OP NOTE
OPERATIVE REPORT  PATIENT NAME: Fang Farrell    :  1990  MRN: [id number]  Pt Location: [facility] L&D OR ROOM 02    SURGERY DATE: 2025    Surgeons and Role:     * Lisseth Vale MD - Primary     * R Tori Raymond MD - Assisting    Preop Diagnosis:  IUP @37.5  Type I DM with retinopathy  Prior    Obesity    Procedure(s) (LRB):   SECTION () (N/A)    Specimen(s):  ID Type Source Tests Collected by Time Destination   1 :  Tissue (Placenta on Hold) OB Only Placenta TISSUE EXAM OB (PLACENTA) ONLY Lisseth Vale MD 2025    A :  Cord Blood Cord BLOOD GAS, VENOUS, CORD, BLOOD GAS, ARTERIAL, CORD Lisseth Vale MD 2025        Surgical QBL:  Surgical QBL (mL): 336 mL      Drains:  Urethral Catheter Non-latex 16 Fr. (Active)   Output (mL) 400 mL 25 1030   Number of days: 0       Anesthesia Type:   Spinal     Operative Indications:  See Above     Esteban Group Classification System:  No Multiple pregnancy, No Transverse or oblique lie, No Breech lie, Gestational age is > or =37 weeks, Multiparous, Previous uterine scar +  is ESTEBAN GROUP 5    Operative Findings:  Thickened fascia  No intra-abdominal adhesions  Viable female   Normal uterus, tubes, ovaries     Complications:   None    Procedure and Technique:    The patient was taken to the operating room where a time out was performed to confirm correct patient and correct procedure.  Spinal anesthesia was adequately established.   Prior to incision 2gm Ancef was given for preoperative prophylaxis.  The patient was then placed in a supine position with a left lateral tilt.  Andres catheter was placed in sterile fashion. Fetal heart tones were noted to be normal. The patient was then prepped and draped in the usual sterile fashion. Anesthesia was tested using an allis clamp and noted to be adequate.    An incision was made in the skin with a surgical scalpel. Sharp and blunt dissection was  used to reach the level of the rectus fascia. Bovie electrocautery was utilitzed for hemostasis during this process. The fascia was incised transversely at the midline and the fascial incision was extended bilaterally using sharp dissection.    The superior edge of the fascial incision was grasped with Kocher clamps, tented up and the underlying rectus muscles were dissected off bluntly and sharply using the scapel. The inferior edge of the fascial incision was then dissection off the rectus muscles with blunt and sharp dissection. The rectus muscles were then divided at the midline. The peritoneum was identified, tented up at its upper margin taking care to avoid the bladder, and then entered with sharp and blunt dissection. The peritoneal incision and rectus muscle were extended bilaterally bluntly with gentle traction.  Bovie electrocautery was used to divide thickened peritoneum to allow for blunt extension.  The bladder blade was inserted. Then, a transverse incision was made in the lower uterine segment using a new surgical blade.  The uterine incision was extended cephalad and caudal using blunt dissection.  The amniotic sac was entered bluntly and the amniotic fluid was noted to be Clear.    The surgeon's hand was placed into the uterine cavity.  The fetus was noted to be in cephalic presentation, and the presenting part was grasped and delivered through the uterine incision with the assistance of fundal pressure. No nuchal cord was identified. The infant's oral and nasal passages were bulb suctioned.  After delivery the cord was doubly clamped and cut.  The infant was then passed off the table to the awaiting  staff.  Venous and arterial blood gas, cord blood, and portion of cord was obtained for analysis and routine blood testing.  The placenta delivery was then spontaneous. Placenta was noted to be intact with a centrally inserted three-vessel cord.  Oxytocin was administered by IV infusion to  enhance uterine contraction.  The uterus was exteriorized and cleared of all clots and debris by blunt curretage with a moist lap sponge.  The uterine incision was reapproximated using a 0-monocryl in a running locked fashion.  A second imbricating stitch with 0- monocryl was placed.  The uterine incision was examined and noted to be hemostatic.  The posterior cul-de-sac was cleared of all clots.      The uterus was replaced into the abdomen and the pericolic gutters were cleared of all clots. The uterine incision was once again reexamined and noted to be hemostatic.  The fascia was re-approximated using 0 Vicryl in a running nonlocked fashion.  The subcutaneous tissue was irrigated and cleared of all clots and debris.  Good hemostasis was noted was achieved with Bovie electrocautery.  The subcutaneous  tissue was reapproximated with 2-0 Plain suture.  The skin incision was closed with 3-0 monocryl.  Good hemostasis was noted.  A mepilex dressing was placed  All needle, sponge, and instrument counts were noted to be correct x 2 at the end of the procedure.     The patient was then cleansed and transferred to the recovery room.    Overall, the patient tolerated the procedure well and is currently in stable condition in the PACU with her .         I was present for all critical portions of the procedure., A qualified resident physician was not available., and A co-surgeon was required because of skills and techniques relevant to speciality.    Patient Disposition:  PACU         SIGNATURE: Lisseth Malcolm MD  DATE: 2025  TIME: 2:06 PM

## 2025-07-09 LAB
ALBUMIN SERPL BCG-MCNC: 2.6 G/DL (ref 3.5–5)
ALP SERPL-CCNC: 76 U/L (ref 34–104)
ALT SERPL W P-5'-P-CCNC: 9 U/L (ref 7–52)
ANION GAP SERPL CALCULATED.3IONS-SCNC: 3 MMOL/L (ref 4–13)
AST SERPL W P-5'-P-CCNC: 19 U/L (ref 13–39)
BILIRUB SERPL-MCNC: 0.28 MG/DL (ref 0.2–1)
BUN SERPL-MCNC: 8 MG/DL (ref 5–25)
CALCIUM ALBUM COR SERPL-MCNC: 9.2 MG/DL (ref 8.3–10.1)
CALCIUM SERPL-MCNC: 8.1 MG/DL (ref 8.4–10.2)
CHLORIDE SERPL-SCNC: 108 MMOL/L (ref 96–108)
CO2 SERPL-SCNC: 23 MMOL/L (ref 21–32)
CREAT SERPL-MCNC: 0.56 MG/DL (ref 0.6–1.3)
ERYTHROCYTE [DISTWIDTH] IN BLOOD BY AUTOMATED COUNT: 13.2 % (ref 11.6–15.1)
GFR SERPL CREATININE-BSD FRML MDRD: 120 ML/MIN/1.73SQ M
GLUCOSE SERPL-MCNC: 103 MG/DL (ref 65–140)
GLUCOSE SERPL-MCNC: 129 MG/DL (ref 65–140)
GLUCOSE SERPL-MCNC: 130 MG/DL (ref 65–140)
GLUCOSE SERPL-MCNC: 78 MG/DL (ref 65–140)
GLUCOSE SERPL-MCNC: 97 MG/DL (ref 65–140)
HCT VFR BLD AUTO: 29.1 % (ref 34.8–46.1)
HGB BLD-MCNC: 10.2 G/DL (ref 11.5–15.4)
MCH RBC QN AUTO: 31 PG (ref 26.8–34.3)
MCHC RBC AUTO-ENTMCNC: 35.1 G/DL (ref 31.4–37.4)
MCV RBC AUTO: 88 FL (ref 82–98)
PLATELET # BLD AUTO: 145 THOUSANDS/UL (ref 149–390)
PMV BLD AUTO: 10.4 FL (ref 8.9–12.7)
POTASSIUM SERPL-SCNC: 3.9 MMOL/L (ref 3.5–5.3)
PROT SERPL-MCNC: 5.2 G/DL (ref 6.4–8.4)
RBC # BLD AUTO: 3.29 MILLION/UL (ref 3.81–5.12)
SODIUM SERPL-SCNC: 134 MMOL/L (ref 135–147)
WBC # BLD AUTO: 13.13 THOUSAND/UL (ref 4.31–10.16)

## 2025-07-09 PROCEDURE — 82948 REAGENT STRIP/BLOOD GLUCOSE: CPT

## 2025-07-09 PROCEDURE — 99024 POSTOP FOLLOW-UP VISIT: CPT | Performed by: OBSTETRICS & GYNECOLOGY

## 2025-07-09 PROCEDURE — 80053 COMPREHEN METABOLIC PANEL: CPT | Performed by: OBSTETRICS & GYNECOLOGY

## 2025-07-09 PROCEDURE — 85027 COMPLETE CBC AUTOMATED: CPT | Performed by: OBSTETRICS & GYNECOLOGY

## 2025-07-09 RX ORDER — KETOROLAC TROMETHAMINE 30 MG/ML
15 INJECTION, SOLUTION INTRAMUSCULAR; INTRAVENOUS EVERY 6 HOURS SCHEDULED
Status: COMPLETED | OUTPATIENT
Start: 2025-07-09 | End: 2025-07-10

## 2025-07-09 RX ORDER — IBUPROFEN 600 MG/1
600 TABLET, FILM COATED ORAL EVERY 6 HOURS PRN
Status: DISCONTINUED | OUTPATIENT
Start: 2025-07-10 | End: 2025-07-10 | Stop reason: HOSPADM

## 2025-07-09 RX ADMIN — DOCUSATE SODIUM 100 MG: 100 CAPSULE, LIQUID FILLED ORAL at 18:13

## 2025-07-09 RX ADMIN — ACETAMINOPHEN 650 MG: 325 TABLET ORAL at 09:00

## 2025-07-09 RX ADMIN — KETOROLAC TROMETHAMINE 15 MG: 30 INJECTION, SOLUTION INTRAMUSCULAR; INTRAVENOUS at 05:51

## 2025-07-09 RX ADMIN — DOCUSATE SODIUM 100 MG: 100 CAPSULE, LIQUID FILLED ORAL at 08:57

## 2025-07-09 RX ADMIN — ACETAMINOPHEN 650 MG: 325 TABLET ORAL at 15:18

## 2025-07-09 RX ADMIN — ENOXAPARIN SODIUM 40 MG: 40 INJECTION SUBCUTANEOUS at 08:58

## 2025-07-09 RX ADMIN — KETOROLAC TROMETHAMINE 15 MG: 30 INJECTION, SOLUTION INTRAMUSCULAR at 18:13

## 2025-07-09 RX ADMIN — ACETAMINOPHEN 650 MG: 325 TABLET ORAL at 03:03

## 2025-07-09 RX ADMIN — ACETAMINOPHEN 650 MG: 325 TABLET ORAL at 21:31

## 2025-07-09 RX ADMIN — PRENATAL VITAMINS-IRON FUMARATE 27 MG IRON-FOLIC ACID 0.8 MG TABLET 1 TABLET: at 08:57

## 2025-07-09 RX ADMIN — KETOROLAC TROMETHAMINE 15 MG: 30 INJECTION, SOLUTION INTRAMUSCULAR; INTRAVENOUS at 12:38

## 2025-07-09 RX ADMIN — LEVOTHYROXINE SODIUM 150 MCG: 0.07 TABLET ORAL at 05:51

## 2025-07-09 NOTE — PLAN OF CARE
Problem: PAIN - ADULT  Goal: Verbalizes/displays adequate comfort level or baseline comfort level  Description: Interventions:  - Encourage patient to monitor pain and request assistance  - Assess pain using appropriate pain scale  - Administer analgesics as ordered based on type and severity of pain and evaluate response  - Implement non-pharmacological measures as appropriate and evaluate response  - Consider cultural and social influences on pain and pain management  - Notify physician/advanced practitioner if interventions unsuccessful or patient reports new pain  - Educate patient/family on pain management process including their role and importance of  reporting pain   - Provide non-pharmacologic/complimentary pain relief interventions  Outcome: Progressing     Problem: INFECTION - ADULT  Goal: Absence or prevention of progression during hospitalization  Description: INTERVENTIONS:  - Assess and monitor for signs and symptoms of infection  - Monitor lab/diagnostic results  - [place] appropriate cooling/warming therapies per order  - Administer medications as ordered  - Instruct and encourage patient and family to use good hand hygiene technique  - Identify and instruct in appropriate isolation precautions for identified infection/condition  Outcome: Progressing    Problem: SAFETY ADULT  Goal: Patient will remain free of falls  Description: INTERVENTIONS:  - Educate patient/family on patient safety including physical limitations  - Instruct patient to call for assistance with activity   - Keep Call bell within reach  - Keep bed low and locked with side rails adjusted as appropriate  - Keep care items and personal belongings within reach  - Initiate and maintain comfort rounds  Outcome: Progressing     Problem: SAFETY ADULT  Goal: Maintain or return to baseline ADL function  Description: INTERVENTIONS:  -  Assess patient's ability to carry out ADLs; assess patient's baseline for ADL function and identify  physical deficits which impact ability to perform ADLs (bathing, care of mouth/teeth, toileting, grooming, dressing, etc.)  - Assess/evaluate cause of self-care deficits   - Assess range of motion  - Assess patient's mobility; develop plan if impaired  - Assess patient's need for assistive devices and provide as appropriate  - Encourage maximum independence but intervene and supervise when necessary  - Involve family in performance of ADLs  - Assess for home care needs following discharge   - Consider OT consult to assist with ADL evaluation and planning for discharge  - Provide patient education as appropriate  - Monitor functional capacity and physical performance, use of AM PAC & JH-HLM   - Monitor gait, balance and fatigue with ambulation  Outcome: Progressing     Problem: DISCHARGE PLANNING  Goal: Discharge to home or other facility with appropriate resources  Description: INTERVENTIONS:  - Identify barriers to discharge w/patient and caregiver  - Arrange for needed discharge resources and transportation as appropriate  - Identify discharge learning needs (meds, wound care, etc.)  - Arrange for interpretive services to assist at discharge as needed  - Refer to Case Management Department for coordinating discharge planning if the patient needs post-hospital services based on physician/advanced practitioner order or complex needs related to functional status, cognitive ability, or social support system  Outcome: Progressing     Problem: Knowledge Deficit  Goal: Patient/family/caregiver demonstrates understanding of disease process, treatment plan, medications, and discharge instructions  Description: Complete learning assessment and assess knowledge base.  Interventions:  - Provide teaching at level of understanding  - Provide teaching via preferred learning methods  Outcome: Progressing     Problem: POSTPARTUM  Goal: Experiences normal postpartum course  Description: INTERVENTIONS:  - Monitor maternal vital  signs  - Assess uterine involution and lochia  Outcome: Progressing     Problem: POSTPARTUM  Goal: Appropriate maternal -  bonding  Description: INTERVENTIONS:  - Identify family support  - Assess for appropriate maternal/infant bonding   -Encourage maternal/infant bonding opportunities  - Referral to  or  as needed  Outcome: Progressing    Problem: POSTPARTUM  Goal: Appropriate maternal -  bonding  Description: INTERVENTIONS:  - Identify family support  - Assess for appropriate maternal/infant bonding   -Encourage maternal/infant bonding opportunities  - Referral to  or  as needed  Outcome: Progressing     Problem: POSTPARTUM  Goal: Establishment of infant feeding pattern  Description: INTERVENTIONS:  - Assess bottle feeding  - Refer to lactation as needed  2025 0836 by Jasmina Garcia  Outcome: Progressing    Problem: POSTPARTUM  Goal: Incision(s), wounds(s) or drain site(s) healing without S/S of infection  Description: INTERVENTIONS  - Assess and document dressing, incision, wound bed, drain sites and surrounding tissue  - Provide patient and family education  Outcome: Progressing

## 2025-07-09 NOTE — ASSESSMENT & PLAN NOTE
Lab Results   Component Value Date    HGBA1C 4.7 (L) 04/18/2025       Recent Labs     07/08/25  0639 07/08/25  1343 07/08/25  1958 07/09/25  0736   POCGLU 76 162* 149* 97       Blood Sugar Average: Last 72 hrs:  (P) 121

## 2025-07-09 NOTE — ASSESSMENT & PLAN NOTE
Lab Results   Component Value Date    HGBA1C 4.7 (L) 04/18/2025       Recent Labs     07/08/25  0639 07/08/25  1343 07/08/25  1958 07/09/25  0736   POCGLU 76 162* 149* 97       Blood Sugar Average: Last 72 hrs:  (P) 121      Patient on insulin pump, will maintain during this time.    Has postpartum settings.

## 2025-07-09 NOTE — PLAN OF CARE
Problem: PAIN - ADULT  Goal: Verbalizes/displays adequate comfort level or baseline comfort level  Description: Interventions:  - Encourage patient to monitor pain and request assistance  - Assess pain using appropriate pain scale  - Administer analgesics as ordered based on type and severity of pain and evaluate response  - Implement non-pharmacological measures as appropriate and evaluate response  - Consider cultural and social influences on pain and pain management  - Notify physician/advanced practitioner if interventions unsuccessful or patient reports new pain  - Educate patient/family on pain management process including their role and importance of  reporting pain   - Provide non-pharmacologic/complimentary pain relief interventions  Outcome: Progressing     Problem: INFECTION - ADULT  Goal: Absence or prevention of progression during hospitalization  Description: INTERVENTIONS:  - Assess and monitor for signs and symptoms of infection  - Monitor lab/diagnostic results  - Monitor all insertion sites, i.e. indwelling lines, tubes, and drains  - Monitor endotracheal if appropriate and nasal secretions for changes in amount and color  - [place] appropriate cooling/warming therapies per order  - Administer medications as ordered  - Instruct and encourage patient and family to use good hand hygiene technique  - Identify and instruct in appropriate isolation precautions for identified infection/condition  Outcome: Progressing  Goal: Absence of fever/infection during neutropenic period  Description: INTERVENTIONS:  - Monitor WBC  - Perform strict hand hygiene  - Limit to healthy visitors only  - No plants, dried, fresh or silk flowers with bagley in patient room  Outcome: Progressing     Problem: SAFETY ADULT  Goal: Patient will remain free of falls  Description: INTERVENTIONS:  - Educate patient/family on patient safety including physical limitations  - Instruct patient to call for assistance with activity   -  Consider consulting OT/PT to assist with strengthening/mobility based on AM PAC & JH-HLM score  - Consult OT/PT to assist with strengthening/mobility   - Keep Call bell within reach  - Keep bed low and locked with side rails adjusted as appropriate  - Keep care items and personal belongings within reach  - Initiate and maintain comfort rounds  - Make Fall Risk Sign visible to staff  - Apply yellow socks and bracelet for high fall risk patients  - Consider moving patient to room near nurses station  Outcome: Progressing  Goal: Maintain or return to baseline ADL function  Description: INTERVENTIONS:  -  Assess patient's ability to carry out ADLs; assess patient's baseline for ADL function and identify physical deficits which impact ability to perform ADLs (bathing, care of mouth/teeth, toileting, grooming, dressing, etc.)  - Assess/evaluate cause of self-care deficits   - Assess range of motion  - Assess patient's mobility; develop plan if impaired  - Assess patient's need for assistive devices and provide as appropriate  - Encourage maximum independence but intervene and supervise when necessary  - Involve family in performance of ADLs  - Assess for home care needs following discharge   - Consider OT consult to assist with ADL evaluation and planning for discharge  - Provide patient education as appropriate  - Monitor functional capacity and physical performance, use of AM PAC & JH-HLM   - Monitor gait, balance and fatigue with ambulation    Outcome: Progressing  Goal: Maintains/Returns to pre admission functional level  Description: INTERVENTIONS:  - Perform AM-PAC 6 Click Basic Mobility/ Daily Activity assessment daily.  - Set and communicate daily mobility goal to care team and patient/family/caregiver.   - Collaborate with rehabilitation services on mobility goals if consulted  - Out of bed for toileting  - Record patient progress and toleration of activity level   Outcome: Progressing     Problem: DISCHARGE  PLANNING  Goal: Discharge to home or other facility with appropriate resources  Description: INTERVENTIONS:  - Identify barriers to discharge w/patient and caregiver  - Arrange for needed discharge resources and transportation as appropriate  - Identify discharge learning needs (meds, wound care, etc.)  - Arrange for interpretive services to assist at discharge as needed  - Refer to Case Management Department for coordinating discharge planning if the patient needs post-hospital services based on physician/advanced practitioner order or complex needs related to functional status, cognitive ability, or social support system  Outcome: Progressing     Problem: Knowledge Deficit  Goal: Patient/family/caregiver demonstrates understanding of disease process, treatment plan, medications, and discharge instructions  Description: Complete learning assessment and assess knowledge base.  Interventions:  - Provide teaching at level of understanding  - Provide teaching via preferred learning methods  Outcome: Progressing     Problem: BIRTH - VAGINAL/ SECTION  Goal: Fetal and maternal status remain reassuring during the birth process  Description: INTERVENTIONS:  - Monitor vital signs  - Monitor fetal heart rate  - Monitor uterine activity  - Monitor labor progression (vaginal delivery)  - DVT prophylaxis  - Antibiotic prophylaxis  Outcome: Completed  Goal: Emotionally satisfying birthing experience for mother/fetus  Description: Interventions:  - Assess, plan, implement and evaluate the nursing care given to the patient in labor  - Advocate the philosophy that each childbirth experience is a unique experience and support the family's chosen level of involvement and control during the labor process   - Actively participate in both the patient's and family's teaching of the birth process  - Consider cultural, Orthodox and age-specific factors and plan care for the patient in labor  Outcome: Completed

## 2025-07-09 NOTE — PROGRESS NOTES
Progress Note - OB/GYN   Name: Fang Farrell 34 y.o. female I MRN: [id number]  Unit/Bed#: -01 I Date of Admission: 2025   Date of Service: 2025 I Hospital Day: 1     Assessment & Plan  Type 1 diabetes 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, bilateral (HCC)  Lab Results   Component Value Date    HGBA1C 4.7 (L) 2025       Recent Labs     25  0639 25  1343 25  0736   POCGLU 76 162* 149* 97       Blood Sugar Average: Last 72 hrs:  (P) 121      Patient on insulin pump, will maintain during this time.    Has postpartum settings.     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       Pain: controlled  Tolerating Oral Intake: yes  Voiding: yes  Flatus: yes  BM- no  Ambulating: yes  Breastfeeding:well  Shortness of Breath: no  Chest pain- no  Lochia: minimal    Objective:   Vitals:    25 1100   BP: 108/62   Pulse: 91   Resp:    Temp: 98.4 °F (36.9 °C)   SpO2: 99%       Intake/Output Summary (Last 24 hours) at 2025 1105  Last data filed at 2025 2112  Gross per 24 hour   Intake --   Output 725 ml   Net -725 ml       Recent Results (from the past 24 hours)   Fingerstick Glucose (POCT)    Collection Time: 25  1:43 PM   Result Value Ref Range    POC Glucose 162 (H) 65 - 140 mg/dl   Fingerstick Glucose (POCT)    Collection Time: 25  7:58 PM   Result Value Ref Range    POC Glucose 149 (H) 65 - 140 mg/dl   CBC    Collection Time:  07/09/25  4:34 AM   Result Value Ref Range    WBC 13.13 (H) 4.31 - 10.16 Thousand/uL    RBC 3.29 (L) 3.81 - 5.12 Million/uL    Hemoglobin 10.2 (L) 11.5 - 15.4 g/dL    Hematocrit 29.1 (L) 34.8 - 46.1 %    MCV 88 82 - 98 fL    MCH 31.0 26.8 - 34.3 pg    MCHC 35.1 31.4 - 37.4 g/dL    RDW 13.2 11.6 - 15.1 %    Platelets 145 (L) 149 - 390 Thousands/uL    MPV 10.4 8.9 - 12.7 fL   Comprehensive metabolic panel    Collection Time: 07/09/25  4:34 AM   Result Value Ref Range    Sodium 134 (L) 135 - 147 mmol/L    Potassium 3.9 3.5 - 5.3 mmol/L    Chloride 108 96 - 108 mmol/L    CO2 23 21 - 32 mmol/L    ANION GAP 3 (L) 4 - 13 mmol/L    BUN 8 5 - 25 mg/dL    Creatinine 0.56 (L) 0.60 - 1.30 mg/dL    Glucose 103 65 - 140 mg/dL    Calcium 8.1 (L) 8.4 - 10.2 mg/dL    Corrected Calcium 9.2 8.3 - 10.1 mg/dL    AST 19 13 - 39 U/L    ALT 9 7 - 52 U/L    Alkaline Phosphatase 76 34 - 104 U/L    Total Protein 5.2 (L) 6.4 - 8.4 g/dL    Albumin 2.6 (L) 3.5 - 5.0 g/dL    Total Bilirubin 0.28 0.20 - 1.00 mg/dL    eGFR 120 ml/min/1.73sq m   Fingerstick Glucose (POCT)    Collection Time: 07/09/25  7:36 AM   Result Value Ref Range    POC Glucose 97 65 - 140 mg/dl       Physical Exam:  Constitutional:       General: She is not in acute distress.     Appearance: Normal appearance.   HENT:      Head: Normocephalic.      Nose: Nose normal.   Eyes:      General: No scleral icterus.  Cardiovascular:      Pulses: Normal pulses.   Pulmonary:      Effort: Pulmonary effort is normal. No respiratory distress.      Breath sounds: No wheezing.   Abdominal:      General: Bowel sounds are normal. There is no distension.      Palpations: Abdomen is soft. Fundus firm, below umbilicus     Tenderness: There is no abdominal tenderness. There is no guarding.   Musculoskeletal:      Cervical back: Neck supple.      Right lower leg: No edema.      Left lower leg: No edema.   Skin:     General: Skin is warm.      Capillary Refill: Capillary refill takes less than 2  seconds.   Neurological:      Mental Status: She is alert and oriented to person, place, and time.   Psychiatric:         Mood and Affect: Mood normal.        Merline Renner, DO

## 2025-07-10 VITALS
HEART RATE: 89 BPM | OXYGEN SATURATION: 98 % | DIASTOLIC BLOOD PRESSURE: 69 MMHG | RESPIRATION RATE: 18 BRPM | TEMPERATURE: 98 F | SYSTOLIC BLOOD PRESSURE: 117 MMHG

## 2025-07-10 PROBLEM — Z3A.37 37 WEEKS GESTATION OF PREGNANCY: Status: RESOLVED | Noted: 2025-07-08 | Resolved: 2025-07-10

## 2025-07-10 LAB — GLUCOSE SERPL-MCNC: 87 MG/DL (ref 65–140)

## 2025-07-10 PROCEDURE — 82948 REAGENT STRIP/BLOOD GLUCOSE: CPT

## 2025-07-10 PROCEDURE — 99024 POSTOP FOLLOW-UP VISIT: CPT | Performed by: OBSTETRICS & GYNECOLOGY

## 2025-07-10 PROCEDURE — NC001 PR NO CHARGE: Performed by: OBSTETRICS & GYNECOLOGY

## 2025-07-10 RX ORDER — DOCUSATE SODIUM 100 MG/1
100 CAPSULE, LIQUID FILLED ORAL 2 TIMES DAILY
Qty: 30 CAPSULE | Refills: 0 | Status: SHIPPED | OUTPATIENT
Start: 2025-07-10 | End: 2025-07-28

## 2025-07-10 RX ORDER — HYDROXYZINE HYDROCHLORIDE 10 MG/1
10 TABLET, FILM COATED ORAL ONCE
Status: COMPLETED | OUTPATIENT
Start: 2025-07-10 | End: 2025-07-10

## 2025-07-10 RX ORDER — OXYCODONE HYDROCHLORIDE 5 MG/1
5 TABLET ORAL EVERY 4 HOURS PRN
Qty: 6 TABLET | Refills: 0 | Status: SHIPPED | OUTPATIENT
Start: 2025-07-10 | End: 2025-07-20

## 2025-07-10 RX ORDER — IBUPROFEN 600 MG/1
600 TABLET, FILM COATED ORAL EVERY 6 HOURS PRN
Qty: 30 TABLET | Refills: 0 | Status: SHIPPED | OUTPATIENT
Start: 2025-07-10

## 2025-07-10 RX ORDER — INSULIN LISPRO 100 [IU]/ML
INJECTION, SOLUTION INTRAVENOUS; SUBCUTANEOUS
Qty: 30 ML | Refills: 0 | Status: SHIPPED | OUTPATIENT
Start: 2025-07-10

## 2025-07-10 RX ADMIN — ACETAMINOPHEN 650 MG: 325 TABLET ORAL at 03:03

## 2025-07-10 RX ADMIN — PRENATAL VITAMINS-IRON FUMARATE 27 MG IRON-FOLIC ACID 0.8 MG TABLET 1 TABLET: at 08:09

## 2025-07-10 RX ADMIN — DOCUSATE SODIUM 100 MG: 100 CAPSULE, LIQUID FILLED ORAL at 08:09

## 2025-07-10 RX ADMIN — KETOROLAC TROMETHAMINE 15 MG: 30 INJECTION, SOLUTION INTRAMUSCULAR at 00:00

## 2025-07-10 RX ADMIN — SIMETHICONE 80 MG: 80 TABLET, CHEWABLE ORAL at 08:10

## 2025-07-10 RX ADMIN — KETOROLAC TROMETHAMINE 15 MG: 30 INJECTION, SOLUTION INTRAMUSCULAR at 11:24

## 2025-07-10 RX ADMIN — HYDROXYZINE HYDROCHLORIDE 10 MG: 10 TABLET, FILM COATED ORAL at 03:24

## 2025-07-10 RX ADMIN — ENOXAPARIN SODIUM 40 MG: 40 INJECTION SUBCUTANEOUS at 08:10

## 2025-07-10 RX ADMIN — KETOROLAC TROMETHAMINE 15 MG: 30 INJECTION, SOLUTION INTRAMUSCULAR at 06:21

## 2025-07-10 RX ADMIN — LEVOTHYROXINE SODIUM 150 MCG: 0.07 TABLET ORAL at 06:21

## 2025-07-10 NOTE — DISCHARGE SUMMARY
Discharge Summary - OB/GYN   Name: Fang Farrell 34 y.o. female I MRN: [id number]  Unit/Bed#: -01 I Date of Admission: 2025   Date of Service: 7/10/2025 I Hospital Day: 2    ADMISSION  Patient of: [facility] [place]  Admission Date: 2025   Admitting Attending: Dr. Lisseth Vale MD  Admitting Diagnoses: Problem List[1]    DELIVERY  Delivery Method: , Low Transverse   Delivery Date and Time: 2025 8:58 AM  Delivery Attending: Lisseth Vale    DISCHARGE  Discharge Date: 7/10/25  Discharge Attending: Dr. Chang  Discharge Diagnosis:   Same, Delivered    Clinical course: Admission to Delivery  Fang Farrell is a 34 y.o.  who was admitted at 37w5d for scheduled repeat  section.  Delivery  Route of Delivery: , Low Transverse  Reason for  delivery: Prior  1    Anesthesia: Spinal,     Delivery: , Low Transverse at 2025 8:58 AM    Baby's Weight: 3460 g (7 lb 10.1 oz); 122.05    Apgar scores: 5  and 9  at 1 and 5 minutes, respectively    Clinical Course: Post-Delivery:  The post delivery course was unremarkable.    On the day of discharge, the patient was ambulating, voiding spontaneously, tolerating oral intake, and hemodynamically stable. She was able to reasonably perform all ADLs. She had appropriate bowel function. Pain was well-controlled. She was discharged home on postpartum/postop day #2 without complications. Patient was instructed to follow up with her OB as an outpatient and was given appropriate warnings to call her provider with problems or concerns.    Pertinent lab findings included:   Blood type A+.     Last three Hgb values:  Lab Results   Component Value Date    HGB 10.2 (L) 2025    HGB 12.8 2025    HGB 11.0 (L) 2025        Problem-specific follow-up plans included the following:  Assessment & Plan  Status post primary low transverse  section  A/P.  34 y.o.  POD#2 s/p  , Low Transverse at 37w5d of 3460 g (7 lb 10.1 oz) female , apgars 5 /9 .  (1) POD#2.  Delivered 2025  8:58 AM.  Doing well.  Would very much like to go home.  --> Discharge home.  --> Follow up one week in office for dressing removal and wound check.  Type 1 diabetes 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, bilateral (HCC)  [place] Type I DM.  Patient on insulin pump, will maintain during this time.    Has postpartum settings.     She is doing well with her insulin pump.  --> Continue insulin pump.  Hypothyroid  --> Continue levothyroxine 150mcg daily.      Discharge med list:     Medication List      START taking these medications     docusate sodium 100 mg capsule; Commonly known as: COLACE; Take 1   capsule (100 mg total) by mouth 2 (two) times a day   ibuprofen 600 mg tablet; Commonly known as: MOTRIN; Take 1 tablet (600   mg total) by mouth every 6 (six) hours as needed for moderate pain or mild   pain for up to 9 doses   oxyCODONE 5 immediate release tablet; Commonly known as: ROXICODONE;   Take 1 tablet (5 mg total) by mouth every 4 (four) hours as needed for   moderate pain for up to 10 days Max Daily Amount: 30 mg     CHANGE how you take these medications     insulin lispro 100 units/mL injection; Commonly known as:   HumALOG/ADMELOG; Inject as per your insulin pump settings.; What changed:   how much to take, how to take this, when to take this, additional   instructions     CONTINUE taking these medications     Dexcom G7 Sensor; Use 1 Device every 10 days   Glucagon Emergency 1 MG Kit   Iron Supplement 75 (15 Fe) mg/mL drops; Generic drug: ferrous sulfate   levothyroxine 150 mcg tablet; TAKE 1 TABLET BY MOUTH 6 DAYS A WEEK   PRENATAL VITAMIN PO   VALTREX PO     STOP taking these medications     folic acid 1 mg tablet; Commonly known as: FOLVITE       Condition at discharge:   good     Disposition:   Home    Planned Readmission:    No    Discharge Statement:  I have spent a total time of 45 minutes in caring for this patient on the day of the visit/encounter. >30 minutes of time was spent on: Impressions, Counseling / Coordination of care, Documenting in the medical record, and Reviewing / ordering tests, medicine, procedures  .         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

## 2025-07-10 NOTE — ANESTHESIA POSTPROCEDURE EVALUATION
Post-Op Assessment Note    CV Status:  Stable  Pain Score: 0    Pain management: adequate       Mental Status:  Alert and awake   Hydration Status:  Euvolemic   PONV Controlled:  Controlled   Airway Patency:  Patent     Post Op Vitals Reviewed: Yes    No anethesia notable event occurred.    Staff: Anesthesiologist, with CRNAs           Last Filed PACU Vitals:  Vitals Value Taken Time   Temp 97.7 °F (36.5 °C) 07/08/25 09:45   Pulse 82 07/08/25 09:47   /76 07/08/25 09:47   Resp     SpO2 98 % 07/08/25 09:47       Modified [place]:     Vitals Value Taken Time   Activity 1 07/08/25 09:47   Respiration 2 07/08/25 09:47   Circulation 2 07/08/25 09:47   Consciousness 2 07/08/25 09:47   Oxygen Saturation 2 07/08/25 09:47     Modified [place] Score: 9

## 2025-07-10 NOTE — ASSESSMENT & PLAN NOTE
Pregestational Type I DM.  Patient on insulin pump, will maintain during this time.    Has postpartum settings.     She is doing well with her insulin pump.  --> Continue insulin pump.

## 2025-07-10 NOTE — PROGRESS NOTES
Progress Note - OB/GYN   Name: Fang Farrell 34 y.o. female I MRN: [id number]  Unit/Bed#: -01 I Date of Admission: 2025   Date of Service: 7/10/2025 I Hospital Day: 2     Assessment & Plan  Status post primary low transverse  section  A/P.  34 y.o.  POD#2 s/p , Low Transverse at 37w5d of 3460 g (7 lb 10.1 oz) female , apgars 5 /9 .  (1) POD#2.  Delivered 2025  8:58 AM.  Doing well.  Would very much like to go home.  --> Discharge home.  --> Follow up one week in office for dressing removal and wound check.  Type 1 diabetes 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, bilateral (HCC)  Pregestational Type I DM.  Patient on insulin pump, will maintain during this time.    Has postpartum settings.     She is doing well with her insulin pump.  --> Continue insulin pump.  Hypothyroid  --> Continue levothyroxine 150mcg daily.    Bill Chang MD  07/10/25  ----------------------------------------------------------------------------------------------    Subjective/    Feels well.  Tolerating po, ambulating, voiding without difficulty.  Happy.  Bonding.  Pain controlled with oral medications.  (+)flatus.    Objective/  Blood pressure 117/69, pulse 89, temperature 98 °F (36.7 °C), temperature source Temporal, resp. rate 18, last menstrual period 10/15/2024, SpO2 98%, not currently breastfeeding.    Patient Vitals for the past 24 hrs:   BP Temp Temp src Pulse Resp SpO2   07/10/25 0700 117/69 98 °F (36.7 °C) Temporal 89 18 98 %   07/10/25 0310 118/80 98.2 °F (36.8 °C) Temporal 84 18 99 %   25 2300 125/79 98 °F (36.7 °C) Oral 92 18 --   25 1921 111/72 98.7 °F (37.1 °C) Temporal 92 18 99 %   25 1518 108/71 97.9 °F (36.6 °C) Temporal 94 18 97 %   25 1100 108/62 98.4 °F (36.9 °C) Oral 91 18 99 %       Physical Exam/      General:  Alert, comfortable, NAD      Cardiovascular: Regular rate and rhythm      Respiratory: Clear  to auscultation bilaterally.      Abdomen: Soft, non-tender, non-distended.  Dressing clean and dry.      Fundus: Firm, below umbilicus, nontender      Extremities: Warm, non-tender      Labs/      Blood type:  A+/-      Rubella: Immune      Lab Results   Component Value Date    WBC 13.13 (H) 07/09/2025    HGB 10.2 (L) 07/09/2025    HCT 29.1 (L) 07/09/2025    MCV 88 07/09/2025     (L) 07/09/2025

## 2025-07-10 NOTE — PLAN OF CARE
Problem: PAIN - ADULT  Goal: Verbalizes/displays adequate comfort level or baseline comfort level  Description: Interventions:  - Encourage patient to monitor pain and request assistance  - Assess pain using appropriate pain scale  - Administer analgesics as ordered based on type and severity of pain and evaluate response  - Implement non-pharmacological measures as appropriate and evaluate response  - Consider cultural and social influences on pain and pain management  - Notify physician/advanced practitioner if interventions unsuccessful or patient reports new pain  - Educate patient/family on pain management process including their role and importance of  reporting pain   - Provide non-pharmacologic/complimentary pain relief interventions  Outcome: Adequate for Discharge     Problem: INFECTION - ADULT  Goal: Absence or prevention of progression during hospitalization  Description: INTERVENTIONS:  - Assess and monitor for signs and symptoms of infection  - Monitor lab/diagnostic results  - Monitor all insertion sites, i.e. indwelling lines, tubes, and drains  - Monitor endotracheal if appropriate and nasal secretions for changes in amount and color  - [place] appropriate cooling/warming therapies per order  - Administer medications as ordered  - Instruct and encourage patient and family to use good hand hygiene technique  - Identify and instruct in appropriate isolation precautions for identified infection/condition  Outcome: Adequate for Discharge  Goal: Absence of fever/infection during neutropenic period  Description: INTERVENTIONS:  - Monitor WBC  - Perform strict hand hygiene  - Limit to healthy visitors only  - No plants, dried, fresh or silk flowers with bagley in patient room  Outcome: Adequate for Discharge     Problem: SAFETY ADULT  Goal: Patient will remain free of falls  Description: INTERVENTIONS:  - Educate patient/family on patient safety including physical limitations  - Instruct patient to call  for assistance with activity   - Consider consulting OT/PT to assist with strengthening/mobility based on AM PAC & JH-HLM score  - Consult OT/PT to assist with strengthening/mobility   - Keep Call bell within reach  - Keep bed low and locked with side rails adjusted as appropriate  - Keep care items and personal belongings within reach  - Initiate and maintain comfort rounds  - Make Fall Risk Sign visible to staff    - Apply yellow socks and bracelet for high fall risk patients  - Consider moving patient to room near nurses station  Outcome: Adequate for Discharge  Goal: Maintain or return to baseline ADL function  Description: INTERVENTIONS:  -  Assess patient's ability to carry out ADLs; assess patient's baseline for ADL function and identify physical deficits which impact ability to perform ADLs (bathing, care of mouth/teeth, toileting, grooming, dressing, etc.)  - Assess/evaluate cause of self-care deficits   - Assess range of motion  - Assess patient's mobility; develop plan if impaired  - Assess patient's need for assistive devices and provide as appropriate  - Encourage maximum independence but intervene and supervise when necessary  - Involve family in performance of ADLs  - Assess for home care needs following discharge   - Consider OT consult to assist with ADL evaluation and planning for discharge  - Provide patient education as appropriate  - Monitor functional capacity and physical performance, use of AM PAC & JH-HLM   - Monitor gait, balance and fatigue with ambulation    Outcome: Adequate for Discharge  Goal: Maintains/Returns to pre admission functional level  Description: INTERVENTIONS:  - Perform AM-PAC 6 Click Basic Mobility/ Daily Activity assessment daily.  - Set and communicate daily mobility goal to care team and patient/family/caregiver.   - Collaborate with rehabilitation services on mobility goals if consulted    - Out of bed for toileting  - Record patient progress and toleration of  activity level   Outcome: Adequate for Discharge     Problem: DISCHARGE PLANNING  Goal: Discharge to home or other facility with appropriate resources  Description: INTERVENTIONS:  - Identify barriers to discharge w/patient and caregiver  - Arrange for needed discharge resources and transportation as appropriate  - Identify discharge learning needs (meds, wound care, etc.)  - Arrange for interpretive services to assist at discharge as needed  - Refer to Case Management Department for coordinating discharge planning if the patient needs post-hospital services based on physician/advanced practitioner order or complex needs related to functional status, cognitive ability, or social support system  Outcome: Adequate for Discharge     Problem: POSTPARTUM  Goal: Experiences normal postpartum course  Description: INTERVENTIONS:  - Monitor maternal vital signs  - Assess uterine involution and lochia  Outcome: Adequate for Discharge  Goal: Appropriate maternal -  bonding  Description: INTERVENTIONS:  - Identify family support  - Assess for appropriate maternal/infant bonding   -Encourage maternal/infant bonding opportunities  - Referral to  or  as needed  Outcome: Adequate for Discharge  Goal: Establishment of infant feeding pattern  Description: INTERVENTIONS:  - Assess breast/bottle feeding  - Refer to lactation as needed  Outcome: Adequate for Discharge  Goal: Incision(s), wounds(s) or drain site(s) healing without S/S of infection  Description: INTERVENTIONS  - Assess and document dressing, incision, wound bed, drain sites and surrounding tissue  - Provide patient and family education  - Perform skin care/dressing changes every   Outcome: Adequate for Discharge

## 2025-07-10 NOTE — PLAN OF CARE
Problem: PAIN - ADULT  Goal: Verbalizes/displays adequate comfort level or baseline comfort level  Description: Interventions:  - Encourage patient to monitor pain and request assistance  - Assess pain using appropriate pain scale  - Administer analgesics as ordered based on type and severity of pain and evaluate response  - Implement non-pharmacological measures as appropriate and evaluate response  - Consider cultural and social influences on pain and pain management  - Notify physician/advanced practitioner if interventions unsuccessful or patient reports new pain  - Educate patient/family on pain management process including their role and importance of  reporting pain   - Provide non-pharmacologic/complimentary pain relief interventions  Outcome: Progressing     Problem: INFECTION - ADULT  Goal: Absence or prevention of progression during hospitalization  Description: INTERVENTIONS:  - Assess and monitor for signs and symptoms of infection  - Monitor lab/diagnostic results  - Monitor all insertion sites, i.e. indwelling lines, tubes, and drains  - Monitor endotracheal if appropriate and nasal secretions for changes in amount and color  - [place] appropriate cooling/warming therapies per order  - Administer medications as ordered  - Instruct and encourage patient and family to use good hand hygiene technique  - Identify and instruct in appropriate isolation precautions for identified infection/condition  Outcome: Progressing  Goal: Absence of fever/infection during neutropenic period  Description: INTERVENTIONS:  - Monitor WBC  - Perform strict hand hygiene  - Limit to healthy visitors only  - No plants, dried, fresh or silk flowers with bagley in patient room  Outcome: Progressing     Problem: SAFETY ADULT  Goal: Patient will remain free of falls  Description: INTERVENTIONS:  - Educate patient/family on patient safety including physical limitations  - Instruct patient to call for assistance with activity   -  Consider consulting OT/PT to assist with strengthening/mobility based on AM PAC & JH-HLM score  - Consult OT/PT to assist with strengthening/mobility   - Keep Call bell within reach  - Keep bed low and locked with side rails adjusted as appropriate  - Keep care items and personal belongings within reach  - Initiate and maintain comfort rounds  - Make Fall Risk Sign visible to staff  - Apply yellow socks and bracelet for high fall risk patients  - Consider moving patient to room near nurses station  Outcome: Progressing  Goal: Maintain or return to baseline ADL function  Description: INTERVENTIONS:  -  Assess patient's ability to carry out ADLs; assess patient's baseline for ADL function and identify physical deficits which impact ability to perform ADLs (bathing, care of mouth/teeth, toileting, grooming, dressing, etc.)  - Assess/evaluate cause of self-care deficits   - Assess range of motion  - Assess patient's mobility; develop plan if impaired  - Assess patient's need for assistive devices and provide as appropriate  - Encourage maximum independence but intervene and supervise when necessary  - Involve family in performance of ADLs  - Assess for home care needs following discharge   - Consider OT consult to assist with ADL evaluation and planning for discharge  - Provide patient education as appropriate  - Monitor functional capacity and physical performance, use of AM PAC & JH-HLM   - Monitor gait, balance and fatigue with ambulation    Outcome: Progressing  Goal: Maintains/Returns to pre admission functional level  Description: INTERVENTIONS:  - Perform AM-PAC 6 Click Basic Mobility/ Daily Activity assessment daily.  - Set and communicate daily mobility goal to care team and patient/family/caregiver.   - Collaborate with rehabilitation services on mobility goals if consulted  - Out of bed for toileting  - Record patient progress and toleration of activity level   Outcome: Progressing     Problem: DISCHARGE  PLANNING  Goal: Discharge to home or other facility with appropriate resources  Description: INTERVENTIONS:  - Identify barriers to discharge w/patient and caregiver  - Arrange for needed discharge resources and transportation as appropriate  - Identify discharge learning needs (meds, wound care, etc.)  - Arrange for interpretive services to assist at discharge as needed  - Refer to Case Management Department for coordinating discharge planning if the patient needs post-hospital services based on physician/advanced practitioner order or complex needs related to functional status, cognitive ability, or social support system  Outcome: Progressing     Problem: Knowledge Deficit  Goal: Patient/family/caregiver demonstrates understanding of disease process, treatment plan, medications, and discharge instructions  Description: Complete learning assessment and assess knowledge base.  Interventions:  - Provide teaching at level of understanding  - Provide teaching via preferred learning methods  Outcome: Progressing     Problem: POSTPARTUM  Goal: Experiences normal postpartum course  Description: INTERVENTIONS:  - Monitor maternal vital signs  - Assess uterine involution and lochia  Outcome: Progressing  Goal: Appropriate maternal -  bonding  Description: INTERVENTIONS:  - Identify family support  - Assess for appropriate maternal/infant bonding   -Encourage maternal/infant bonding opportunities  - Referral to  or  as needed  Outcome: Progressing  Goal: Establishment of infant feeding pattern  Description: INTERVENTIONS:  - Assess breast/bottle feeding  - Refer to lactation as needed  Outcome: Progressing  Goal: Incision(s), wounds(s) or drain site(s) healing without S/S of infection  Description: INTERVENTIONS  - Assess and document dressing, incision, wound bed, drain sites and surrounding tissue  - Provide patient and family education  Outcome: Progressing

## 2025-07-10 NOTE — NURSING NOTE
Patient expresses readiness for d/c and verbalizes understanding of discharge instructions and follow up.

## 2025-07-10 NOTE — ASSESSMENT & PLAN NOTE
A/P.  34 y.o.  POD#2 s/p , Low Transverse at 37w5d of 3460 g (7 lb 10.1 oz) female , apgars 5 /9 .  (1) POD#2.  Delivered 2025  8:58 AM.  Doing well.  Would very much like to go home.  --> Discharge home.  --> Follow up one week in office for dressing removal and wound check.

## 2025-07-11 ENCOUNTER — TELEPHONE (OUTPATIENT)
Age: 35
End: 2025-07-11

## 2025-07-11 NOTE — TELEPHONE ENCOUNTER
POSTPARTUM PHONE CALL ASSESSMENT    Date of Delivery: 25    Delivering Provider: Dr Vale    Mode:     Delivery Notes/Complications: No complications    How are you doing physically/emotionally? Patient states she is doing well.     Breastfeeding/Formula/Both? Bottle feeding - reviewed avoiding breast stimulation, verbalized understanding    Do you still have bleeding? yes  If so, how much/how severe? light vaginal bleeding    Do you have any pain? yes  If so, how much/how severe? no pain and mild pain    Regular BMs/Urination? yes    Do you have any other questions or concerns for us or your provider? no     Have you scheduled the pediatrician appointment with pediatrician? yes    Do you have a postpartum visit scheduled? yes  Date scheduled: 25 Provider:  Dr Vael

## 2025-07-14 PROBLEM — E10.3212: Status: RESOLVED | Noted: 2022-04-27 | Resolved: 2025-07-14

## 2025-07-14 PROBLEM — E10.3393: Status: ACTIVE | Noted: 2018-04-09

## 2025-07-15 ENCOUNTER — OFFICE VISIT (OUTPATIENT)
Age: 35
End: 2025-07-15

## 2025-07-15 VITALS — HEIGHT: 62 IN | WEIGHT: 181.8 LBS | BODY MASS INDEX: 33.45 KG/M2

## 2025-07-15 PROCEDURE — 88307 TISSUE EXAM BY PATHOLOGIST: CPT | Performed by: PATHOLOGY

## 2025-07-15 PROCEDURE — 99024 POSTOP FOLLOW-UP VISIT: CPT | Performed by: OBSTETRICS & GYNECOLOGY

## 2025-07-23 ENCOUNTER — NURSE TRIAGE (OUTPATIENT)
Age: 35
End: 2025-07-23

## 2025-07-23 ENCOUNTER — TELEPHONE (OUTPATIENT)
Age: 35
End: 2025-07-23

## 2025-07-23 DIAGNOSIS — R30.0 BURNING WITH URINATION: Primary | ICD-10-CM

## 2025-07-23 DIAGNOSIS — R39.9 UTI SYMPTOMS: ICD-10-CM

## 2025-07-23 DIAGNOSIS — R39.9 UTI SYMPTOMS: Primary | ICD-10-CM

## 2025-07-23 RX ORDER — NITROFURANTOIN 25; 75 MG/1; MG/1
100 CAPSULE ORAL 2 TIMES DAILY
Qty: 10 CAPSULE | Refills: 0 | Status: SHIPPED | OUTPATIENT
Start: 2025-07-23 | End: 2025-07-28

## 2025-07-23 RX ORDER — NITROFURANTOIN 25; 75 MG/1; MG/1
100 CAPSULE ORAL 2 TIMES DAILY
Qty: 10 CAPSULE | Refills: 0 | Status: SHIPPED | OUTPATIENT
Start: 2025-07-23 | End: 2025-07-23 | Stop reason: SDUPTHER

## 2025-07-23 RX ORDER — PHENAZOPYRIDINE HYDROCHLORIDE 200 MG/1
200 TABLET, FILM COATED ORAL
Qty: 6 TABLET | Refills: 0 | Status: SHIPPED | OUTPATIENT
Start: 2025-07-23 | End: 2025-07-25

## 2025-07-23 NOTE — PROGRESS NOTES
UTI sxs.  No office appts available this week.     For Ucx and start macrobid + pyridium.  Pt is NOT breastfeeding.     //RMR

## 2025-07-23 NOTE — TELEPHONE ENCOUNTER
"REASON FOR CONVERSATION: No chief complaint on file.    SYMPTOMS: Increased pain at incision, Trouble emptying bladder, burning toward end of urination at urethra, tenderness in lower abdomen when urinating     OTHER HEALTH INFORMATION: Patient post partum from 7/8/25, called office complaining of increased intermittent pain at incision that started about 6 days ago. She rates pain a 7-8. She takes Tylenol and Ibuprofen that does not relive the pain.  She also stopped bleeding for about 2 days and started bleeding like a light period again last night with no clots. She also states she has been having trouble emptying bladder, burning toward end of urination at urethra, tenderness in lower abdomen when urinating. She denies redness at incision, incision gaping or being open, drainage or bleeding of incision, odor, fever, weakness, chills rash, frequency of urination, urgency, flank pain, blood in urine or any other symptoms to note. Advised patient will order urine labs to have done as soon as possible. Attempted to schedule sooner post partum appointment, as her post partum appointment is scheduled 7/30, no appointments available. Called office, spoke to chandan Mason appointments available at this time. Will reach out to provider for further advice at this time.     PROTOCOL DISPOSITION: Call PCP Within 24 Hours    CARE ADVICE PROVIDED: Call if fever, redness around incision, drainage, rash, chills, weakness, worsening pain. Have labs done as soon as possible.     PRACTICE FOLLOW-UP: Routing to provider for further recommendations.                   Reason for Disposition   [1] MILD to MODERATE post-op pain (e.g., pain scale 1-7) AND [2]  not relieved with pain medication   All other urine symptoms    Answer Assessment - Initial Assessment Questions  1. SYMPTOM: \"What's the main symptom you're concerned about?\" (e.g., drainage, incision opened up, pain, redness)      Increased pain   2. ONSET: \"When did pain " "start?\"      6 days ago   3. DATE of : \"When was the  performed?\"       25  4. REDNESS: \"Is there any redness at the incision site?\" If Yes, ask: \"How wide across is the redness?\" (inches, centimeters)       None   5. PAIN: \"Is there any pain?\" If Yes, ask: \"How bad is it?\"  (Scale 1-10; or mild, moderate, severe)      Yes- 7-8. Intermittent    6. BLEEDING: \"Is there any bleeding?\" If Yes, ask: \"How much?\" and \"Where?\" (e.g., incision site, vaginal)       None   7. DRAINAGE: \"Is there any drainage from the incision site?\" If Yes, ask: \"What color and how much?\" (e.g., red, cloudy, pus; drops, teaspoon)      None   8. FEVER: \"Do you have a fever?\" If Yes, ask: \"What is your temperature, how was it measured, and when did it start?\"      No   9. OTHER SYMPTOMS: \"Do you have any other symptoms?\" (e.g., rash elsewhere on body, shaking chills, weakness)      No    Answer Assessment - Initial Assessment Questions  1. SYMPTOM: \"What's the main symptom you're concerned about?\" (e.g., frequency, incontinence)      Trouble emptying bladder, burning toward end of urination at urethra, tenderness in lower abdomen when urinating     3. PAIN: \"Is there any pain?\" If Yes, ask: \"How bad is it?\" (Scale: 1-10; mild, moderate, severe)      Tenderness and burning   4. CAUSE: \"What do you think is causing the symptoms?\"      Unknown, unsure if UTI  5. OTHER SYMPTOMS: \"Do you have any other symptoms?\" (e.g., blood in urine, fever, flank pain, pain with urination)      No   6. PREGNANCY: \"Is there any chance you are pregnant?\" \"When was your last menstrual period?\"      Post partum     Protocols used: Postpartum -  Incision Symptoms-Adult-AH, Urinary Symptoms-Adult-OH    "

## 2025-07-23 NOTE — TELEPHONE ENCOUNTER
PT called back let her know about script. She stated she needs it sent to a different pharmacy the one that it was sent to is not close for her. Please send script to [facility] Address: [address] [place] [address] phone number [phone]. PT also requesting sooner appointment if possible then PP visit.

## 2025-07-23 NOTE — TELEPHONE ENCOUNTER
"Called patient and advised per Dr. Za Cooper:    \" urinalysis will be contaminated due to bleeding. This is a postpartum issue and best addressed by OB  Would prefer an appt this week to review need for prophylactic medication\"    She verbalized understanding. Attempted to find appointment. No appointments this week. Called office spoke with Isabella, put on hold and call disconnected. Attempted to call back , no answer. Patient upset, states she needs to sleep as this is the only time she gets to nap in a 24 hour time period. Advised someone will call her back to schedule an appointment.   "

## 2025-07-23 NOTE — TELEPHONE ENCOUNTER
LMOM for patient that medication was ordered and sent to pharmacy. Advised that she needs to complete urine sample ASAP to ensure we are treating with the correct medication. Provided call back number for any questions

## 2025-07-24 NOTE — TELEPHONE ENCOUNTER
Spoke with the patient to verify that medication and labs obtained. She did  her meds and has completed the urine sample today at [facility]. Offered appt with Dr. Vale on Monday since she is also having incisional pain with the urinary concerns. Patient agreeable.

## 2025-07-27 LAB
APPEARANCE UR: CLEAR
BACTERIA UR CULT: NORMAL
BACTERIA URNS QL MICRO: ABNORMAL
BILIRUB UR QL STRIP: NEGATIVE
CASTS URNS QL MICRO: ABNORMAL /LPF
COLOR UR: YELLOW
EPI CELLS #/AREA URNS HPF: ABNORMAL /HPF (ref 0–10)
GLUCOSE UR QL: NEGATIVE
HGB UR QL STRIP: ABNORMAL
KETONES UR QL STRIP: NEGATIVE
LEUKOCYTE ESTERASE UR QL STRIP: ABNORMAL
Lab: NO GROWTH
MICRO URNS: ABNORMAL
NITRITE UR QL STRIP: NEGATIVE
PH UR STRIP: 6 [PH] (ref 5–7.5)
PROT UR QL STRIP: NEGATIVE
RBC #/AREA URNS HPF: ABNORMAL /HPF (ref 0–2)
SP GR UR: 1.02 (ref 1–1.03)
UROBILINOGEN UR STRIP-ACNC: 0.2 MG/DL (ref 0.2–1)
WBC #/AREA URNS HPF: ABNORMAL /HPF (ref 0–5)

## 2025-07-28 ENCOUNTER — POSTPARTUM VISIT (OUTPATIENT)
Age: 35
End: 2025-07-28

## 2025-07-28 VITALS
WEIGHT: 180.1 LBS | DIASTOLIC BLOOD PRESSURE: 82 MMHG | SYSTOLIC BLOOD PRESSURE: 122 MMHG | HEIGHT: 62 IN | BODY MASS INDEX: 33.14 KG/M2

## 2025-07-28 DIAGNOSIS — B37.2 SKIN YEAST INFECTION: ICD-10-CM

## 2025-07-28 DIAGNOSIS — Z30.011 INITIATION OF ORAL CONTRACEPTION: ICD-10-CM

## 2025-07-28 PROCEDURE — 99024 POSTOP FOLLOW-UP VISIT: CPT | Performed by: OBSTETRICS & GYNECOLOGY

## 2025-07-28 RX ORDER — DESOGESTREL AND ETHINYL ESTRADIOL 0.15-0.03
1 KIT ORAL DAILY
Qty: 84 TABLET | Refills: 1 | Status: SHIPPED | OUTPATIENT
Start: 2025-07-28

## 2025-07-28 RX ORDER — CLOTRIMAZOLE AND BETAMETHASONE DIPROPIONATE 10; .64 MG/G; MG/G
CREAM TOPICAL 2 TIMES DAILY
Qty: 45 G | Refills: 0 | Status: SHIPPED | OUTPATIENT
Start: 2025-07-28

## 2025-08-17 DIAGNOSIS — E10.3493: ICD-10-CM

## 2025-08-19 RX ORDER — ACYCLOVIR 400 MG/1
1 TABLET ORAL
Qty: 3 EACH | Refills: 5 | Status: SHIPPED | OUTPATIENT
Start: 2025-08-19

## (undated) DEVICE — PACK C-SECTION PBDS

## (undated) DEVICE — 3M™ STERI-STRIP™ REINFORCED ADHESIVE SKIN CLOSURES, R1547, 1/2 IN X 4 IN (12 MM X 100 MM), 6 STRIPS/ENVELOPE: Brand: 3M™ STERI-STRIP™

## (undated) DEVICE — SUT VICRYL 2-0 CT-1 27 IN J259H

## (undated) DEVICE — TELFA NON-ADHERENT ABSORBENT DRESSING: Brand: TELFA

## (undated) DEVICE — GLOVE PI ULTRA TOUCH SZ 6

## (undated) DEVICE — DRESSING GUAZE ADH BORDER 4 X 4 IN

## (undated) DEVICE — ABDOMINAL PAD: Brand: DERMACEA

## (undated) DEVICE — Device

## (undated) DEVICE — SUT VICRYL 0 CTX 36 IN J978H

## (undated) DEVICE — SUT VICRYL 0 CT-1 27 IN J260H

## (undated) DEVICE — KIWI® VACUUM DELIVERY SYSTEM, OMNICUP®: Brand: KIWI® OMNICUP®

## (undated) DEVICE — ABD PAD

## (undated) DEVICE — SPONGE LAP 18 X 18 IN STRL RFD

## (undated) DEVICE — SKIN MARKER DUAL TIP WITH RULER CAP, FLEXIBLE RULER AND LABELS: Brand: DEVON

## (undated) DEVICE — GLOVE INDICATOR UNDERGLOVE SZ 6 BLUE